# Patient Record
Sex: MALE | Race: WHITE | NOT HISPANIC OR LATINO | Employment: OTHER | URBAN - NONMETROPOLITAN AREA
[De-identification: names, ages, dates, MRNs, and addresses within clinical notes are randomized per-mention and may not be internally consistent; named-entity substitution may affect disease eponyms.]

---

## 2017-05-31 ENCOUNTER — COMMUNICATION - GICH (OUTPATIENT)
Dept: FAMILY MEDICINE | Facility: OTHER | Age: 77
End: 2017-05-31

## 2017-05-31 DIAGNOSIS — I10 ESSENTIAL (PRIMARY) HYPERTENSION: ICD-10-CM

## 2017-06-26 ENCOUNTER — COMMUNICATION - GICH (OUTPATIENT)
Dept: FAMILY MEDICINE | Facility: OTHER | Age: 77
End: 2017-06-26

## 2017-06-26 DIAGNOSIS — I10 ESSENTIAL (PRIMARY) HYPERTENSION: ICD-10-CM

## 2017-12-28 NOTE — TELEPHONE ENCOUNTER
Patient Information     Patient Name MRN Sex Jasson Rae [id number] Male 1940      Telephone Encounter by Marly Novoa RN at 2017 10:22 AM     Author:  Marly Novoa RN Service:  (none) Author Type:  NURS- Registered Nurse     Filed:  2017 10:31 AM Encounter Date:  2017 Status:  Signed     :  Marly Novoa RN (NURS- Registered Nurse)            [employer]    Office visit in the past 12 months or per provider note.    Last visit with SUZI WAKEFIELD was on: 2016 in [facility]  Next visit with SUZI WAKEFIELD is on: No future appointment listed with this provider  Next visit with Family Practice is on: No future appointment listed in this department    Lab test requirements:  Creatinine and Potassium annually, if ordering lab, order BMP.  CREATININE (mg/dL)    Date Value   2015 1.29     POTASSIUM (mmol/L)    Date Value   2015 4.6       Max refill for 12 months from last office visit or per provider note    REFUSED - patient was [facility]. See refill request denial 17    Unable to complete prescription refill per RN Medication Refill Policy.................... Marly Novoa RN ....................  2017   10:30 AM

## 2018-01-05 NOTE — TELEPHONE ENCOUNTER
Patient Information     Patient Name MRN Sex Jasson Rae [id number] Male 1940      Telephone Encounter by Hilaria Benitez RN at 2017  1:35 PM     Author:  Hilaria Benitez RN Service:  (none) Author Type:  NURS- Registered Nurse     Filed:  2017  1:36 PM Encounter Date:  2017 Status:  Signed     :  Hilaria Benitez RN (NURS- Registered Nurse)            Left message to call back  ....................Hilaria Benitez RN  2017   1:35 PM

## 2018-01-05 NOTE — TELEPHONE ENCOUNTER
Patient Information     Patient Name MRN Sex Jasson Rae [id number] Male 1940      Telephone Encounter by Hilaria Benitez RN at 2017  1:40 PM     Author:  Hilaria Benitez RN Service:  (none) Author Type:  NURS- Registered Nurse     Filed:  2017  1:41 PM Encounter Date:  2017 Status:  Signed     :  Hilaria Benitez RN (NURS- Registered Nurse)            Patient's [facility] will [facility] from his doctor there.    Hilaria Benitez RN........2017 1:41 PM

## 2018-01-05 NOTE — TELEPHONE ENCOUNTER
"Patient Information     Patient Name MRN Sex Jasson Rae [id number] Male 1940      Telephone Encounter by Hilaria Benitez RN at 2017  1:02 PM     Author:  Hilaria Benitez RN Service:  (none) Author Type:  NURS- Registered Nurse     Filed:  2017  1:07 PM Encounter Date:  2017 Status:  Signed     :  Hilaria Benitez RN (NURS- Registered Nurse)            Patient has not seen PCP for over 1 year. Patient was sent a letter in December, but patient's wife called stating patient had \"[facility]\" [facility]. Unable to fill per RN refill policy as patient is overdue for labs and exam. To PCP for consideration.    This is a Refill request from: [employer]   Name of Medication: Lisinopril  Quantity requested: 180  Last fill date: 2016  Due for refill: yes  Last visit with GALINDO BROTHERS was on: 2016 in [facility]  PCP:  Galindo Brothers MD  Controlled Substance Agreement:  n/a   Diagnosis r/t this medication request: HTN    Lab test requirements:  Creatinine and Potassium annually, if ordering lab, order BMP.  CREATININE (mg/dL)    Date Value   2015 1.29     POTASSIUM (mmol/L)    Date Value   2015 4.6     Unable to complete prescription refill per RN Medication Refill Policy.................... Hilaria Benitez RN ....................  2017   1:04 PM          "

## 2018-01-30 ENCOUNTER — DOCUMENTATION ONLY (OUTPATIENT)
Dept: FAMILY MEDICINE | Facility: OTHER | Age: 78
End: 2018-01-30

## 2018-01-30 RX ORDER — AMLODIPINE BESYLATE 10 MG/1
10 TABLET ORAL DAILY
COMMUNITY
Start: 2015-12-23 | End: 2022-11-30

## 2018-01-30 RX ORDER — INSULIN GLARGINE 100 [IU]/ML
INJECTION, SOLUTION SUBCUTANEOUS
COMMUNITY
Start: 2015-12-23 | End: 2022-11-30

## 2018-01-30 RX ORDER — CALCIUM CARBONATE 500(1250)
500 TABLET,CHEWABLE ORAL
COMMUNITY
Start: 2016-02-12 | End: 2022-11-30

## 2018-01-30 RX ORDER — MELOXICAM 7.5 MG/1
7.5 TABLET ORAL DAILY
COMMUNITY
Start: 2016-04-12 | End: 2022-11-30

## 2018-01-30 RX ORDER — PREDNISONE 20 MG/1
20 TABLET ORAL
COMMUNITY
Start: 2016-03-31 | End: 2022-11-30

## 2018-01-30 RX ORDER — LANCETS 23 GAUGE
EACH MISCELLANEOUS
COMMUNITY
Start: 2015-12-23

## 2018-01-30 RX ORDER — ASPIRIN 81 MG/1
81 TABLET ORAL
COMMUNITY
Start: 2015-12-23

## 2018-01-30 RX ORDER — HYDROCHLOROTHIAZIDE 25 MG/1
25 TABLET ORAL DAILY
Status: ON HOLD | COMMUNITY
Start: 2016-03-03 | End: 2024-09-14

## 2018-01-30 RX ORDER — LISINOPRIL 40 MG/1
40 TABLET ORAL DAILY
Status: ON HOLD | COMMUNITY
Start: 2016-12-19 | End: 2024-09-14

## 2018-01-30 RX ORDER — INSULIN PUMP SYRINGE, 3 ML
EACH MISCELLANEOUS
COMMUNITY
Start: 2015-12-23

## 2018-01-30 RX ORDER — METOPROLOL TARTRATE 50 MG
50 TABLET ORAL 2 TIMES DAILY
COMMUNITY
Start: 2015-12-23 | End: 2022-11-30

## 2018-01-30 RX ORDER — NICOTINE POLACRILEX 4 MG/1
20 GUM, CHEWING ORAL DAILY
COMMUNITY
Start: 2015-12-23

## 2018-01-30 RX ORDER — GABAPENTIN 300 MG/1
900 CAPSULE ORAL 2 TIMES DAILY
COMMUNITY
Start: 2016-03-31

## 2018-05-01 ENCOUNTER — OFFICE VISIT (OUTPATIENT)
Dept: OTOLARYNGOLOGY | Facility: OTHER | Age: 78
End: 2018-05-01
Attending: OTOLARYNGOLOGY
Payer: COMMERCIAL

## 2018-05-01 DIAGNOSIS — H90.3 SENSORINEURAL HEARING LOSS (SNHL), BILATERAL: Primary | ICD-10-CM

## 2018-05-01 PROCEDURE — G0463 HOSPITAL OUTPT CLINIC VISIT: HCPCS

## 2018-05-01 NOTE — MR AVS SNAPSHOT
"              After Visit Summary   2018    Jasson Dougherty    MRN: [id number]           Patient Information     Date Of Birth          1940        Visit Information        Provider Department      2018 1:10 PM Koby Hernandez MD [facility]        Today's Diagnoses     Sensorineural hearing loss (SNHL), bilateral    -  1       Follow-ups after your visit        Who to contact     If you have questions or need follow up information about today's clinic visit or your schedule please contact [facility] directly at [phone].  Normal or non-critical lab and imaging results will be communicated to you by [email or web address], letter or phone within 4 business days after the clinic has received the results. If you do not hear from us within 7 days, please contact the clinic through [email or web address] or phone. If you have a critical or abnormal lab result, we will notify you by phone as soon as possible.  Submit refill requests through Smartsy or call your pharmacy and they will forward the refill request to us. Please allow 3 business days for your refill to be completed.          Additional Information About Your Visit        MyChart Information     Smartsy lets you send messages to your doctor, view your test results, renew your prescriptions, schedule appointments and more. To sign up, go to [email or web address] . Click on \"Log in\" on the left side of the screen, which will take you to the Welcome page. Then click on \"Sign up Now\" on the right side of the page.     You will be asked to enter the access code listed below, as well as some personal information. Please follow the directions to create your username and password.     Your access code is: [email or web address]  Expires: 2018  7:51 AM     Your access code will  in 90 days. If you need help or a new code, please call your [facility] clinic or [phone].        Care EveryWhere ID     This is your Care EveryWhere ID. " This could be used by other organizations to access your [facility] medical records  [id number]         Blood Pressure from Last 3 Encounters:   04/12/16 170/80   03/31/16 128/56   02/26/16 140/82    Weight from Last 3 Encounters:   04/12/16 113.4 kg (250 lb)   03/31/16 111 kg (244 lb 9.6 oz)   02/26/16 107.5 kg (237 lb)              Today, you had the following     No orders found for display       Primary Care Provider Office Phone # Fax #    Marcin Fiore [phone]       [facility] [address] [facility] [address]        Equal Access to Services     JONATHON HERNANDEZ : Lyn Ramirez, sarmad bobo, renetta doradomacharles salazar, krysta sheikh. So [facility] [phone].    ATENCIÓN: Si habla español, tiene a marie disposición servicios gratuitos de asistencia lingüística. Llame al [phone].    We comply with applicable federal civil rights laws and [place] laws. We do not discriminate on the basis of race, color, national origin, age, disability, sex, sexual orientation, or gender identity.            Thank you!     Thank you for choosing [facility] AND [facility]  for your care. Our goal is always to provide you with excellent care. Hearing back from our patients is one way we can continue to improve our services. Please take a few minutes to complete the written survey that you may receive in the mail after your visit with us. Thank you!             Your Updated Medication List - Protect others around you: Learn how to safely use, store and throw away your medicines at www.disposemymeds.org.          This list is accurate as of 5/1/18 11:59 PM.  Always use your most recent med list.                   Brand Name Dispense Instructions for use Diagnosis    acetaminophen-codeine 300-30 MG per tablet    TYLENOL #3     Take 1-2 tablets by mouth every 4 hours as needed for moderate pain        * aspirin 81 MG EC tablet            * aspirin EC 81 MG EC tablet      Take 81 mg by mouth daily with food        calcium carbonate 1250 (500 Ca) MG Chew      500 mg        capecitabine 500 MG tablet CHEMO    XELODA     Take 1,500 mg by mouth        ferrous sulfate 325 (65 Fe) MG tablet    IRON     Take 325 mg by mouth        fish oil-omega-3 fatty acids 1000 MG capsule           FLOMAX 0.4 MG capsule   Generic drug:  tamsulosin      Take 0.4 mg by mouth daily        gabapentin 300 MG capsule    NEURONTIN     Take 300 mg by mouth 3 times daily        GLUCAGON EMERGENCY 1 MG kit   Generic drug:  glucagon      Inject 1 mg into the muscle        GLUCOPHAGE 1000 MG tablet   Generic drug:  metFORMIN      Take 1,000 mg by mouth        HumaLOG KWIKpen 100 UNIT/ML injection   Generic drug:  insulin lispro      16 units prior to breakfast 26 units b/4 lunch and  supper  DX code:  250.00        * hydrochlorothiazide 25 MG tablet    HYDRODIURIL     Take 25 mg by mouth        * hydrochlorothiazide 25 MG tablet    HYDRODIURIL     Take 25 mg by mouth daily        IBUPROFEN PO      Take 400 mg by mouth every 4 hours as needed for moderate pain        insulin aspart 100 UNIT/ML injection    NovoLOG PEN     1 unit per 8 grams of carbs.  Give within 15 minutes before or after meals/snacks.        insulin pen needle 32G X 8 MM      120 each        * LANTUS SOLOSTAR 100 UNIT/ML injection   Generic drug:  insulin glargine      36 units of lantus twice daily or as adjusted by RN.  DX Code:  250..00        * insulin glargine 100 UNIT/ML injection    LANTUS     Inject  subcutaneous before bedtime. Inject 36 units of lantus twice daily or as adjusted by RN        * lisinopril 20 MG tablet    PRINIVIL/ZESTRIL     Take 40 mg by mouth        * lisinopril 20 MG tablet    PRINIVIL/ZESTRIL     Take 40 mg by mouth 2 times daily        loperamide 2 MG capsule    IMODIUM     Take 2 mg by mouth as needed for diarrhea        meloxicam 7.5 MG tablet    MOBIC     Take 7.5 mg by mouth daily         * metoprolol tartrate 50 MG tablet    LOPRESSOR     TAKE ONE TABLET BY MOUTH TWICE DAILY        * metoprolol tartrate 50 MG tablet    LOPRESSOR     Take 50 mg by mouth 2 times daily        * NORVASC 10 MG tablet   Generic drug:  amLODIPine      Take 10 mg by mouth        * amLODIPine 10 MG tablet    NORVASC     Take 10 mg by mouth daily        * ONETOUCH BASIC SYSTEM w/Device Kit           * FIFTY50 GLUCOSE METER 2.0 w/Device Kit           * ONETOUCH LANCETS Misc           * lancets 28G Misc           ONETOUCH TEST STRIPS test strip   Generic drug:  blood glucose monitoring      check sugars once daily before meals        PRAVACHOL 20 MG tablet   Generic drug:  pravastatin      Take 20 mg by mouth        predniSONE 20 MG tablet    DELTASONE     Take 20 mg by mouth daily with food        * priLOSEC 20 MG CR capsule   Generic drug:  omeprazole           * RA OMEPRAZOLE 20 MG tablet   Generic drug:  omeprazole      Take 20 mg by mouth daily        prochlorperazine 10 MG tablet    COMPAZINE     Take 10 mg by mouth        * Notice:  This list has 18 medication(s) that are the same as other medications prescribed for you. Read the directions carefully, and ask your doctor or other care provider to review them with you.

## 2018-05-01 NOTE — PROGRESS NOTES
MARILY PENNY    78 Y old Male, : 1940    Account Number: [id number]     [address] [id number]    Home: [phone]     Guarantor: MARILY PENNY Insurance: [employer] [facility] Payer ID: [id number]   PCP: Huong Fiore MD    Appointment Facility: [facility]      2018 Koby Hernandez MD        Reason for Appointment     1. HEARING EVALUATION     2. Hearing loss     History of Present Illness     HPI:   The patient is an 78-year-old  who is sent to us by the [employer] for assistance with hearing and hearing aids. He has already service connected for his hearing and gets his hearing aids through the 's administration. He is presently wearing a hearing aid on the right. It is in regularly for repairs. He has tolerated hearing aid use well.     Examination     General Examination:  External auditory canals and TMs are clear bilaterally   The remainder of the head neck exam is unremarkable   Audiogram-he has a relatively flat sensorineural hearing loss with 60 decibel speech reception threshold on the right and 55 decibel speech reception threshold on the left. His discrimination scores are intact.       Assessments     1. Sensorineural hearing loss (SNHL) of both ears - H90.3 (Primary)     Treatment     1. Others   Notes: Patient was counseled that he has moderate to severe sensorineural hearing loss. There is no contraindication to continue hearing aid use. He was referred back to audiology for hearing aid mold production.  Procedures  [ ].                Follow Up     prn                         Electronically signed by KOBY HERNANDEZ MD on 2018 at 01:16 PM CDT     Sign off status: Completed         Review of Systems     [facility]  [address] [place] [address]  Tel: [phone]  Fax:       [ ].           Patient: MARILY PENNY : 1940 Progress Note: Koby Hernandez MD 2018        Note  generated by [employer] EMR/PM Software ([email or web address])

## 2022-02-14 ENCOUNTER — HOSPITAL ENCOUNTER (OUTPATIENT)
Dept: MRI IMAGING | Facility: OTHER | Age: 82
Discharge: HOME | End: 2022-02-14
Attending: FAMILY MEDICINE | Admitting: FAMILY MEDICINE
Payer: COMMERCIAL

## 2022-02-14 DIAGNOSIS — M75.101 TEAR OF RIGHT ROTATOR CUFF, UNSPECIFIED TEAR EXTENT, UNSPECIFIED WHETHER TRAUMATIC: ICD-10-CM

## 2022-02-14 PROCEDURE — 73221 MRI JOINT UPR EXTREM W/O DYE: CPT | Mod: RT

## 2022-05-16 ENCOUNTER — APPOINTMENT (OUTPATIENT)
Dept: CT IMAGING | Facility: OTHER | Age: 82
End: 2022-05-16
Attending: PHYSICIAN ASSISTANT
Payer: COMMERCIAL

## 2022-05-16 ENCOUNTER — APPOINTMENT (OUTPATIENT)
Dept: GENERAL RADIOLOGY | Facility: OTHER | Age: 82
End: 2022-05-16
Attending: PHYSICIAN ASSISTANT
Payer: COMMERCIAL

## 2022-05-16 ENCOUNTER — HOSPITAL ENCOUNTER (EMERGENCY)
Facility: OTHER | Age: 82
Discharge: ANOTHER HOSPITAL | End: 2022-05-16
Attending: PHYSICIAN ASSISTANT | Admitting: PHYSICIAN ASSISTANT
Payer: COMMERCIAL

## 2022-05-16 VITALS
SYSTOLIC BLOOD PRESSURE: 170 MMHG | WEIGHT: 240 LBS | BODY MASS INDEX: 36.37 KG/M2 | DIASTOLIC BLOOD PRESSURE: 78 MMHG | HEART RATE: 81 BPM | TEMPERATURE: 99.5 F | HEIGHT: 68 IN | RESPIRATION RATE: 19 BRPM | OXYGEN SATURATION: 90 %

## 2022-05-16 DIAGNOSIS — J18.9 PNEUMONIA: ICD-10-CM

## 2022-05-16 DIAGNOSIS — R09.02 HYPOXIA: ICD-10-CM

## 2022-05-16 DIAGNOSIS — R53.1 WEAKNESS: ICD-10-CM

## 2022-05-16 LAB
ALBUMIN SERPL-MCNC: 3.8 G/DL (ref 3.5–5.7)
ALBUMIN UR-MCNC: 600 MG/DL
ALP SERPL-CCNC: 52 U/L (ref 34–104)
ALT SERPL W P-5'-P-CCNC: 13 U/L (ref 7–52)
ANION GAP SERPL CALCULATED.3IONS-SCNC: 11 MMOL/L (ref 3–14)
APPEARANCE UR: CLEAR
AST SERPL W P-5'-P-CCNC: 19 U/L (ref 13–39)
BASOPHILS # BLD AUTO: 0 10E3/UL (ref 0–0.2)
BASOPHILS NFR BLD AUTO: 1 %
BILIRUB DIRECT SERPL-MCNC: 0.1 MG/DL (ref 0–0.2)
BILIRUB SERPL-MCNC: 0.4 MG/DL (ref 0.3–1)
BILIRUB UR QL STRIP: NEGATIVE
BUN SERPL-MCNC: 24 MG/DL (ref 7–25)
CALCIUM SERPL-MCNC: 9 MG/DL (ref 8.6–10.3)
CHLORIDE BLD-SCNC: 97 MMOL/L (ref 98–107)
CO2 SERPL-SCNC: 29 MMOL/L (ref 21–31)
COLOR UR AUTO: YELLOW
CREAT SERPL-MCNC: 1.45 MG/DL (ref 0.7–1.3)
EOSINOPHIL # BLD AUTO: 0 10E3/UL (ref 0–0.7)
EOSINOPHIL NFR BLD AUTO: 1 %
ERYTHROCYTE [DISTWIDTH] IN BLOOD BY AUTOMATED COUNT: 14.6 % (ref 10–15)
FLUAV RNA SPEC QL NAA+PROBE: NEGATIVE
FLUBV RNA RESP QL NAA+PROBE: NEGATIVE
GFR SERPL CREATININE-BSD FRML MDRD: 48 ML/MIN/1.73M2
GLUCOSE BLD-MCNC: 184 MG/DL (ref 70–105)
GLUCOSE UR STRIP-MCNC: 30 MG/DL
HCT VFR BLD AUTO: 49.4 % (ref 40–53)
HGB BLD-MCNC: 15.4 G/DL (ref 13.3–17.7)
HGB UR QL STRIP: ABNORMAL
HYALINE CASTS: 1 /LPF
IMM GRANULOCYTES # BLD: 0 10E3/UL
IMM GRANULOCYTES NFR BLD: 1 %
KETONES UR STRIP-MCNC: NEGATIVE MG/DL
LACTATE SERPL-SCNC: 2 MMOL/L (ref 0.7–2)
LEUKOCYTE ESTERASE UR QL STRIP: NEGATIVE
LYMPHOCYTES # BLD AUTO: 0.5 10E3/UL (ref 0.8–5.3)
LYMPHOCYTES NFR BLD AUTO: 8 %
MCH RBC QN AUTO: 29 PG (ref 26.5–33)
MCHC RBC AUTO-ENTMCNC: 31.2 G/DL (ref 31.5–36.5)
MCV RBC AUTO: 93 FL (ref 78–100)
MONOCYTES # BLD AUTO: 0.7 10E3/UL (ref 0–1.3)
MONOCYTES NFR BLD AUTO: 11 %
MUCOUS THREADS #/AREA URNS LPF: PRESENT /LPF
NEUTROPHILS # BLD AUTO: 4.8 10E3/UL (ref 1.6–8.3)
NEUTROPHILS NFR BLD AUTO: 78 %
NITRATE UR QL: NEGATIVE
NRBC # BLD AUTO: 0 10E3/UL
NRBC BLD AUTO-RTO: 0 /100
NT-PROBNP SERPL-MCNC: 219 PG/ML (ref 0–100)
PH UR STRIP: 6 [PH] (ref 5–9)
PLATELET # BLD AUTO: 223 10E3/UL (ref 150–450)
POTASSIUM BLD-SCNC: 4.8 MMOL/L (ref 3.5–5.1)
PROCALCITONIN SERPL-MCNC: 0.87 NG/ML
PROT SERPL-MCNC: 7.4 G/DL (ref 6.4–8.9)
RBC # BLD AUTO: 5.31 10E6/UL (ref 4.4–5.9)
RBC URINE: 1 /HPF
RSV RNA SPEC NAA+PROBE: NEGATIVE
SARS-COV-2 RNA RESP QL NAA+PROBE: NEGATIVE
SODIUM SERPL-SCNC: 137 MMOL/L (ref 134–144)
SP GR UR STRIP: 1.02 (ref 1–1.03)
UROBILINOGEN UR STRIP-MCNC: NORMAL MG/DL
WBC # BLD AUTO: 6 10E3/UL (ref 4–11)
WBC URINE: 2 /HPF

## 2022-05-16 PROCEDURE — 71045 X-RAY EXAM CHEST 1 VIEW: CPT

## 2022-05-16 PROCEDURE — 85025 COMPLETE CBC W/AUTO DIFF WBC: CPT | Performed by: PHYSICIAN ASSISTANT

## 2022-05-16 PROCEDURE — C9803 HOPD COVID-19 SPEC COLLECT: HCPCS | Performed by: PHYSICIAN ASSISTANT

## 2022-05-16 PROCEDURE — 99285 EMERGENCY DEPT VISIT HI MDM: CPT | Mod: 25 | Performed by: PHYSICIAN ASSISTANT

## 2022-05-16 PROCEDURE — 70450 CT HEAD/BRAIN W/O DYE: CPT

## 2022-05-16 PROCEDURE — 83605 ASSAY OF LACTIC ACID: CPT | Performed by: PHYSICIAN ASSISTANT

## 2022-05-16 PROCEDURE — 250N000011 HC RX IP 250 OP 636: Performed by: PHYSICIAN ASSISTANT

## 2022-05-16 PROCEDURE — 96365 THER/PROPH/DIAG IV INF INIT: CPT | Performed by: PHYSICIAN ASSISTANT

## 2022-05-16 PROCEDURE — 80053 COMPREHEN METABOLIC PANEL: CPT | Performed by: PHYSICIAN ASSISTANT

## 2022-05-16 PROCEDURE — 250N000013 HC RX MED GY IP 250 OP 250 PS 637: Performed by: PHYSICIAN ASSISTANT

## 2022-05-16 PROCEDURE — 82248 BILIRUBIN DIRECT: CPT | Performed by: PHYSICIAN ASSISTANT

## 2022-05-16 PROCEDURE — 99285 EMERGENCY DEPT VISIT HI MDM: CPT | Mod: CS | Performed by: PHYSICIAN ASSISTANT

## 2022-05-16 PROCEDURE — 36415 COLL VENOUS BLD VENIPUNCTURE: CPT | Performed by: PHYSICIAN ASSISTANT

## 2022-05-16 PROCEDURE — 87637 SARSCOV2&INF A&B&RSV AMP PRB: CPT | Performed by: PHYSICIAN ASSISTANT

## 2022-05-16 PROCEDURE — 96368 THER/DIAG CONCURRENT INF: CPT | Performed by: PHYSICIAN ASSISTANT

## 2022-05-16 PROCEDURE — 258N000003 HC RX IP 258 OP 636: Performed by: PHYSICIAN ASSISTANT

## 2022-05-16 PROCEDURE — 72125 CT NECK SPINE W/O DYE: CPT

## 2022-05-16 PROCEDURE — 84145 PROCALCITONIN (PCT): CPT | Performed by: PHYSICIAN ASSISTANT

## 2022-05-16 PROCEDURE — 87040 BLOOD CULTURE FOR BACTERIA: CPT | Performed by: PHYSICIAN ASSISTANT

## 2022-05-16 PROCEDURE — 83880 ASSAY OF NATRIURETIC PEPTIDE: CPT | Performed by: PHYSICIAN ASSISTANT

## 2022-05-16 PROCEDURE — 81001 URINALYSIS AUTO W/SCOPE: CPT | Performed by: PHYSICIAN ASSISTANT

## 2022-05-16 RX ORDER — ACETAMINOPHEN 500 MG
500 TABLET ORAL ONCE
Status: COMPLETED | OUTPATIENT
Start: 2022-05-16 | End: 2022-05-16

## 2022-05-16 RX ORDER — CEFTRIAXONE SODIUM 1 G/50ML
1 INJECTION, SOLUTION INTRAVENOUS ONCE
Status: COMPLETED | OUTPATIENT
Start: 2022-05-16 | End: 2022-05-17

## 2022-05-16 RX ORDER — AZITHROMYCIN 500 MG/5ML
500 INJECTION, POWDER, LYOPHILIZED, FOR SOLUTION INTRAVENOUS EVERY 24 HOURS
Status: DISCONTINUED | OUTPATIENT
Start: 2022-05-16 | End: 2022-05-17 | Stop reason: HOSPADM

## 2022-05-16 RX ADMIN — SODIUM CHLORIDE 1000 ML: 9 INJECTION, SOLUTION INTRAVENOUS at 18:18

## 2022-05-16 RX ADMIN — CEFTRIAXONE SODIUM 1 G: 1 INJECTION, SOLUTION INTRAVENOUS at 23:31

## 2022-05-16 RX ADMIN — ACETAMINOPHEN 500 MG: 500 TABLET ORAL at 18:16

## 2022-05-16 RX ADMIN — AZITHROMYCIN MONOHYDRATE 500 MG: 500 INJECTION, POWDER, LYOPHILIZED, FOR SOLUTION INTRAVENOUS at 22:40

## 2022-05-16 ASSESSMENT — ENCOUNTER SYMPTOMS
VOMITING: 0
SHORTNESS OF BREATH: 1
DYSURIA: 0
FATIGUE: 1
CONFUSION: 0
ABDOMINAL PAIN: 0
FEVER: 1
COUGH: 1
NAUSEA: 0

## 2022-05-16 NOTE — ED TRIAGE NOTES
"Arrives per Meds 1 from home. EMS states they were called for breathing problems but they have not assessed any SOB. Pt states he called 911 because \"he could not get up\" had 2 falls today C/O weakness. States \"he hit his chest when he fell\" but \"did not hit his head\". Denies LOC. A&O X4. C/O chronic pain to shoulders. O2 sat low 90's RA. Will keep O2 off for now.   Triage Assessment     Row Name 05/16/22 [id number]       Triage Assessment (Adult)    Airway WDL X    Additional Documentation Breath Sounds (Group)       Respiratory WDL    Respiratory WDL X;rhythm/pattern;cough    Rhythm/Pattern, Respiratory tachypneic    Cough Frequency frequent       Breath Sounds    Breath Sounds All Fields;DOLLY;LLL;RUL;RML;RLL    All Lung Fields Breath Sounds Anterior:    DOLLY Breath Sounds clear    LLL Breath Sounds diminished    RUL Breath Sounds clear    RML Breath Sounds clear    RLL Breath Sounds clear       Skin Circulation/Temperature WDL    Skin Circulation/Temperature WDL WDL       Cardiac WDL    Cardiac WDL WDL       Peripheral/Neurovascular WDL    Peripheral Neurovascular WDL WDL       Cognitive/Neuro/Behavioral WDL    Cognitive/Neuro/Behavioral WDL WDL              "

## 2022-05-17 NOTE — ED PROVIDER NOTES
"  History     Chief Complaint   Patient presents with     Generalized Weakness     Fall     HPI  Jasson Dougherty is a 82 year old male who presents to the ED for evaluation of generalized weakness and a fall. Arrives per Meds 1 from home. EMS states they were called for breathing problems but they have not assessed any SOB. Pt states he called 911 because \"he could not get up\" had 2 falls today C/O weakness. States \"he hit his chest when he fell\" but \"did not hit his head\". Denies LOC. A&O X4. C/O chronic pain to shoulders. He does report a cough.     Allergies:  Allergies   Allergen Reactions     Penicillins Rash and Unknown       Problem List:    Patient Active Problem List    Diagnosis Date Noted     Right shoulder pain 06/01/2016     Priority: Medium     Primary osteoarthritis of left wrist 04/12/2016     Priority: Medium     Carotid stenosis, right 01/11/2016     Priority: Medium     Cerebrovascular accident (CVA) due to embolism of right middle cerebral artery (H) 01/11/2016     Priority: Medium     CVA, old, monoplegia upper limb 12/23/2015     Priority: Medium     Malignant neoplasm of descending colon (H) 12/23/2015     Priority: Medium     MI, old 12/23/2015     Priority: Medium     Stenosis of right carotid artery 12/23/2015     Priority: Medium     Sleep apnea 08/05/2015     Priority: Medium     Rectal cancer (H) 06/29/2015     Priority: Medium     Arthrodesis status 06/26/2015     Priority: Medium     Overview:   [facility]       Morbid obesity (H) 06/26/2015     Priority: Medium     Overview:   Complicated by severe DJD in back and LEs, Diabetes, [place], GERD, Hypertension.       Malignant neoplasm of rectum (H) 06/26/2015     Priority: Medium     Routine general medical examination at a health care facility 06/07/2013     Priority: Medium     Type 2 diabetes mellitus without complication (H) 04/30/2012     Priority: Medium     Overview:   [facility]       Backache " 2012     Priority: Medium     Hypercholesteremia 2009     Priority: Medium     Overview:   IMO Update 10/11       Esophageal reflux 2004     Priority: Medium     Overview:   [place]       HTN (hypertension) 2004     Priority: Medium     Overview:   HTN          Past Medical History:    No past medical history on file.    Past Surgical History:    Past Surgical History:   Procedure Laterality Date     APPENDECTOMY OPEN      No Comments Provided     BACK SURGERY      fusion     COLONOSCOPY           OTHER SURGICAL HISTORY      [id number],HEMICOLECTOMY     OTHER SURGICAL HISTORY      SUR85,ANKLE ARTHROSCOPY       Family History:    No family history on file.    Social History:  Marital Status:   [2]  Social History     Tobacco Use     Smoking status: Former Smoker     Quit date: 1995     Years since quittin.4     Smokeless tobacco: Never Used   Substance Use Topics     Alcohol use: No     Alcohol/week: 0.0 standard drinks     Drug use: Unknown     Types: Other     Comment: Drug use: No        Medications:    acetaminophen-codeine (TYLENOL #3) 300-30 MG per tablet  amLODIPine (NORVASC) 10 MG tablet  amLODIPine (NORVASC) 10 MG tablet  aspirin 81 MG EC tablet  aspirin EC 81 MG EC tablet  blood glucose (ONE TOUCH TEST STRIPS) test strip  Blood Glucose Monitoring Suppl (FIFTY50 GLUCOSE METER 2.0) W/DEVICE KIT  Blood Glucose Monitoring Suppl (ONE TOUCH BASIC SYSTEM) W/DEVICE KIT  calcium carbonate 1250 (500 CA) MG CHEW  capecitabine (XELODA) 500 MG tablet  ferrous sulfate (IRON) 325 (65 FE) MG tablet  fish oil-omega-3 fatty acids 1000 MG capsule  gabapentin (NEURONTIN) 300 MG capsule  glucagon (GLUCAGON EMERGENCY) 1 MG injection  hydrochlorothiazide (HYDRODIURIL) 25 MG tablet  hydrochlorothiazide (HYDRODIURIL) 25 MG tablet  IBUPROFEN PO  insulin aspart (NOVOLOG PEN) 100 UNIT/ML injection  insulin glargine (LANTUS SOLOSTAR) 100 UNIT/ML PEN  insulin glargine (LANTUS) 100 UNIT/ML  "injection  insulin lispro (HUMALOG KWIKPEN) 100 UNIT/ML soln  insulin pen needle 32G X 8 MM  lancets 28G MISC  lisinopril (PRINIVIL,ZESTRIL) 20 MG tablet  lisinopril (PRINIVIL/ZESTRIL) 20 MG tablet  loperamide (IMODIUM) 2 MG capsule  meloxicam (MOBIC) 7.5 MG tablet  metFORMIN (GLUCOPHAGE) 1000 MG tablet  metoprolol (LOPRESSOR) 50 MG tablet  metoprolol tartrate (LOPRESSOR) 50 MG tablet  omeprazole (PRILOSEC) 20 MG capsule  omeprazole (RA OMEPRAZOLE) 20 MG tablet  ONE TOUCH LANCETS MISC  pravastatin (PRAVACHOL) 20 MG tablet  predniSONE (DELTASONE) 20 MG tablet  prochlorperazine (COMPAZINE) 10 MG tablet  tamsulosin (FLOMAX) 0.4 MG 24 hr capsule          Review of Systems   Constitutional: Positive for fatigue and fever.   HENT: Negative for congestion.    Eyes: Negative for visual disturbance.   Respiratory: Positive for cough and shortness of breath.    Cardiovascular: Negative for chest pain.   Gastrointestinal: Negative for abdominal pain, nausea and vomiting.   Genitourinary: Negative for dysuria.   Psychiatric/Behavioral: Negative for confusion.       Physical Exam   BP: (!) 140/54  Pulse: 90  Temp: (!) 101.5  F (38.6  C)  Resp: 20  Height: 172.7 cm (5' 8\")  Weight: 108.9 kg (240 lb)  SpO2: 96 %      Physical Exam  Constitutional:       General: He is not in acute distress.     Appearance: He is well-developed. He is ill-appearing. He is not diaphoretic.   HENT:      Head: Normocephalic and atraumatic.   Eyes:      General: No scleral icterus.     Conjunctiva/sclera: Conjunctivae normal.   Cardiovascular:      Rate and Rhythm: Normal rate and regular rhythm.   Pulmonary:      Effort: Pulmonary effort is normal.      Breath sounds: Normal breath sounds.   Abdominal:      Palpations: Abdomen is soft.      Tenderness: There is no abdominal tenderness.   Musculoskeletal:         General: No deformity.      Cervical back: Neck supple.   Lymphadenopathy:      Cervical: No cervical adenopathy.   Skin:     General: Skin " is warm and dry.      Coloration: Skin is not jaundiced.      Findings: No rash.   Neurological:      Mental Status: He is alert and oriented to person, place, and time. Mental status is at baseline.   Psychiatric:         Mood and Affect: Mood normal.         Behavior: Behavior normal.         Thought Content: Thought content normal.         Judgment: Judgment normal.         ED Course                 Procedures              Critical Care time:  none              Results for orders placed or performed during the hospital encounter of 05/16/22 (from the past 24 hour(s))   Symptomatic; Unknown Influenza A/B & SARS-CoV2 (COVID-19) Virus PCR Multiplex Nose    Specimen: Nose; Swab   Result Value Ref Range    Influenza A PCR Negative Negative    Influenza B PCR Negative Negative    RSV PCR Negative Negative    SARS CoV2 PCR Negative Negative    Narrative    Testing was performed using the Xpert Xpress CoV2/Flu/RSV Assay on the Cepheid GeneXpert Instrument. This test should be ordered for the detection of SARS-CoV-2 and influenza viruses in individuals who meet clinical and/or epidemiological criteria. Test performance is unknown in asymptomatic patients. This test is for in vitro diagnostic use under the FDA EUA for laboratories certified under CLIA to perform high or moderate complexity testing. This test has not been FDA cleared or approved. A negative result does not rule out the presence of PCR inhibitors in the specimen or target RNA in concentration below the limit of detection for the assay. If only one viral target is positive but coinfection with multiple targets is suspected, the sample should be re-tested with another FDA cleared, approved, or authorized test, if coinfection would change clinical management. This test was validated by the [facility] [employer]. These laboratories are certified under the Clinical  Laboratory Improvement Amendments of 1988 (CLIA-88) as qualified to perform high  complexity laboratory testing.   CBC with platelets differential    Narrative    The following orders were created for panel order CBC with platelets differential.  Procedure                               Abnormality         Status                     ---------                               -----------         ------                     CBC with platelets and d...[[id number]]  Abnormal            Final result                 Please view results for these tests on the individual orders.   Basic metabolic panel   Result Value Ref Range    Sodium 137 134 - 144 mmol/L    Potassium 4.8 3.5 - 5.1 mmol/L    Chloride 97 (L) 98 - 107 mmol/L    Carbon Dioxide (CO2) 29 21 - 31 mmol/L    Anion Gap 11 3 - 14 mmol/L    Urea Nitrogen 24 7 - 25 mg/dL    Creatinine 1.45 (H) 0.70 - 1.30 mg/dL    Calcium 9.0 8.6 - 10.3 mg/dL    Glucose 184 (H) 70 - 105 mg/dL    GFR Estimate 48 (L) >60 mL/min/1.73m2   Lactic acid whole blood   Result Value Ref Range    Lactic Acid 2.0 0.7 - 2.0 mmol/L   CBC with platelets and differential   Result Value Ref Range    WBC Count 6.0 4.0 - 11.0 10e3/uL    RBC Count 5.31 4.40 - 5.90 10e6/uL    Hemoglobin 15.4 13.3 - 17.7 g/dL    Hematocrit 49.4 40.0 - 53.0 %    MCV 93 78 - 100 fL    MCH 29.0 26.5 - 33.0 pg    MCHC 31.2 (L) 31.5 - 36.5 g/dL    RDW 14.6 10.0 - 15.0 %    Platelet Count 223 150 - 450 10e3/uL    % Neutrophils 78 %    % Lymphocytes 8 %    % Monocytes 11 %    % Eosinophils 1 %    % Basophils 1 %    % Immature Granulocytes 1 %    NRBCs per 100 WBC 0 <1 /100    Absolute Neutrophils 4.8 1.6 - 8.3 10e3/uL    Absolute Lymphocytes 0.5 (L) 0.8 - 5.3 10e3/uL    Absolute Monocytes 0.7 0.0 - 1.3 10e3/uL    Absolute Eosinophils 0.0 0.0 - 0.7 10e3/uL    Absolute Basophils 0.0 0.0 - 0.2 10e3/uL    Absolute Immature Granulocytes 0.0 <=0.4 10e3/uL    Absolute NRBCs 0.0 10e3/uL   NT pro BNP   Result Value Ref Range    N terminal Pro BNP Inpatient 219 (H) 0 - 100 pg/mL   Procalcitonin   Result Value Ref Range     Procalcitonin 0.87 <0.50 ng/mL ng/mL   Hepatic panel   Result Value Ref Range    Bilirubin Total 0.4 0.3 - 1.0 mg/dL    Bilirubin Direct 0.1 0.0 - 0.2 mg/dL    Protein Total 7.4 6.4 - 8.9 g/dL    Albumin 3.8 3.5 - 5.7 g/dL    Alkaline Phosphatase 52 34 - 104 U/L    AST 19 13 - 39 U/L    ALT 13 7 - 52 U/L   XR Chest Port 1 View    Narrative    Procedure:XR CHEST PORT 1 VIEW    Clinical history:Male, 82 years, cough, fever, weakness. Pneumonia?    Technique: Single view was obtained.    Comparison: None    Findings: The cardiac silhouette is normal. The pulmonary vasculature  is normal.    The lungs are clear. Bony structures demonstrate degenerative changes  at the AC joints.      Impression    Impression:   No acute abnormality. No evidence of acute or active cardiopulmonary  disease.    SUZI PIERRE MD         SYSTEM ID:  [id number]   CT Head w/o Contrast    Narrative    CT HEAD W/O CONTRAST, CT CERVICAL SPINE W/O CONTRAST, 5/16/2022 7:03  PM    History: Male, age 82 years; recent history of multiple falls    Comparison: None.    Technique:  Head CT technique: CT scan was performed of the head/brain without  contrast. Sagittal, coronal and axial reconstructions were reviewed.    Cervical spine CT technique: CT was performed of the cervical spine.  Sagittal, coronal, and axial reconstructions were reviewed.    Findings:  Head CT: The ventricles and sulci are are mildly enlarged with ex  vacuo dilatation of the right lateral ventricle. The gray and white  matter demonstrate a large focus of encephalomalacia in the right MCA  distribution. The bony structures demonstrate no fracture. Paranasal  sinuses are normal.    Cervical spine CT: The  cervical spine demonstrates generalized  straightening of the normal lordotic curvature. There is no evidence  of an acute fracture. Soft tissues of the neck demonstrate no acute  abnormality. Moderate to moderately severe degenerative changes are  seen throughout the cervical  spine.        Impression    IMPRESSION:   Head CT: No evidence of acute intracranial abnormality. Old right MCA  infarct.    Cervical spine CT: No evidence of acute or subacute bony abnormality.    Moderate to moderately severe multilevel degenerative change of the  cervical spine.    SUZI PIERRE MD         SYSTEM ID:  [id number]   CT Cervical Spine w/o Contrast    Narrative    CT HEAD W/O CONTRAST, CT CERVICAL SPINE W/O CONTRAST, 5/16/2022 7:03  PM    History: Male, age 82 years; recent history of multiple falls    Comparison: None.    Technique:  Head CT technique: CT scan was performed of the head/brain without  contrast. Sagittal, coronal and axial reconstructions were reviewed.    Cervical spine CT technique: CT was performed of the cervical spine.  Sagittal, coronal, and axial reconstructions were reviewed.    Findings:  Head CT: The ventricles and sulci are are mildly enlarged with ex  vacuo dilatation of the right lateral ventricle. The gray and white  matter demonstrate a large focus of encephalomalacia in the right MCA  distribution. The bony structures demonstrate no fracture. Paranasal  sinuses are normal.    Cervical spine CT: The  cervical spine demonstrates generalized  straightening of the normal lordotic curvature. There is no evidence  of an acute fracture. Soft tissues of the neck demonstrate no acute  abnormality. Moderate to moderately severe degenerative changes are  seen throughout the cervical spine.        Impression    IMPRESSION:   Head CT: No evidence of acute intracranial abnormality. Old right MCA  infarct.    Cervical spine CT: No evidence of acute or subacute bony abnormality.    Moderate to moderately severe multilevel degenerative change of the  cervical spine.    SUZI PIERRE MD         SYSTEM ID:  [id number]   UA with Microscopic reflex to Culture    Specimen: Urine, Clean Catch   Result Value Ref Range    Color Urine Yellow Colorless, Straw, Light Yellow, Yellow     Appearance Urine Clear Clear    Glucose Urine 30  (A) Negative mg/dL    Bilirubin Urine Negative Negative    Ketones Urine Negative Negative mg/dL    Specific Gravity Urine 1.023 1.000 - 1.030    Blood Urine Small (A) Negative    pH Urine 6.0 5.0 - 9.0    Protein Albumin Urine 600  (A) Negative mg/dL    Urobilinogen Urine Normal Normal, 2.0 mg/dL    Nitrite Urine Negative Negative    Leukocyte Esterase Urine Negative Negative    Mucus Urine Present (A) None Seen /LPF    RBC Urine 1 <=2 /HPF    WBC Urine 2 <=5 /HPF    Hyaline Casts Urine 1 <=2 /LPF    Narrative    Urine Culture not indicated       Medications   azithromycin 500 mg (ZITHROMAX) in 0.9% NaCl 250 mL intermittent infusion 500 mg (has no administration in time range)   cefTRIAXone in d5w (ROCEPHIN) intermittent infusion 1 g (has no administration in time range)   0.9% sodium chloride BOLUS (1,000 mLs Intravenous New Bag 5/16/22 1818)   acetaminophen (TYLENOL) tablet 500 mg (500 mg Oral Given 5/16/22 1816)       Assessments & Plan (with Medical Decision Making)   Nontoxic but ill appearing. Febrile, otherwise stable vital signs.     No leukocytosis, slight renal insufficiency, normal lactate. UA noninfectious, neg covid/flu.     Procalcitonin elevated.     CT head/neck normal. CXR normal.     We attempt to roadtest and he is very sob with movement, and O2 sats drop to low 80's. Requires 1-2L O2.     His sx's clinically seem most consistent with a pneumonia. We did start rocephin/azithromycin.     I think he does need to be hospitalized, unfortunately JUANPABLO is on divert. He is accepted at [facility] by Dr. Zaragoza. He remains stable for ground transport.     Steven Sweet PA-C    I have reviewed the nursing notes.    I have reviewed the findings, diagnosis, plan and need for follow up with the patient.       New Prescriptions    No medications on file       Final diagnoses:   Pneumonia   Hypoxia   Weakness       5/16/2022   [facility]      Steven Sweet PA  05/16/22 [id number]

## 2022-05-21 LAB
BACTERIA BLD CULT: NO GROWTH
BACTERIA BLD CULT: NO GROWTH

## 2022-10-06 ENCOUNTER — HOSPITAL ENCOUNTER (OUTPATIENT)
Dept: GENERAL RADIOLOGY | Facility: OTHER | Age: 82
Discharge: HOME | End: 2022-10-06
Attending: FAMILY MEDICINE
Payer: COMMERCIAL

## 2022-10-06 DIAGNOSIS — Z01.89 ENCOUNTER FOR OTHER SPECIFIED SPECIAL EXAMINATIONS: ICD-10-CM

## 2022-10-06 PROCEDURE — 73030 X-RAY EXAM OF SHOULDER: CPT | Mod: 50

## 2022-11-30 ENCOUNTER — HOSPITAL ENCOUNTER (INPATIENT)
Facility: OTHER | Age: 82
LOS: 3 days | Discharge: HOME | DRG: 193 | End: 2022-12-03
Attending: FAMILY MEDICINE | Admitting: INTERNAL MEDICINE
Payer: COMMERCIAL

## 2022-11-30 ENCOUNTER — APPOINTMENT (OUTPATIENT)
Dept: GENERAL RADIOLOGY | Facility: OTHER | Age: 82
DRG: 193 | End: 2022-11-30
Attending: FAMILY MEDICINE
Payer: COMMERCIAL

## 2022-11-30 DIAGNOSIS — R09.02 HYPOXEMIA: ICD-10-CM

## 2022-11-30 DIAGNOSIS — I69.954: ICD-10-CM

## 2022-11-30 DIAGNOSIS — R50.9 HYPERTHERMIA-INDUCED DEFECT: ICD-10-CM

## 2022-11-30 DIAGNOSIS — J10.1 INFLUENZA A: ICD-10-CM

## 2022-11-30 DIAGNOSIS — E13.9 DIABETES MELLITUS OF OTHER TYPE WITHOUT COMPLICATION, UNSPECIFIED WHETHER LONG TERM INSULIN USE (H): ICD-10-CM

## 2022-11-30 DIAGNOSIS — Z11.52 ENCOUNTER FOR SCREENING LABORATORY TESTING FOR COVID-19 VIRUS: ICD-10-CM

## 2022-11-30 DIAGNOSIS — I25.2 OLD MYOCARDIAL INFARCTION: ICD-10-CM

## 2022-11-30 DIAGNOSIS — R50.81 FEVER IN OTHER DISEASES: ICD-10-CM

## 2022-11-30 DIAGNOSIS — J96.01 ACUTE RESPIRATORY FAILURE WITH HYPOXIA (H): Primary | ICD-10-CM

## 2022-11-30 DIAGNOSIS — R09.02 HYPOXIA: ICD-10-CM

## 2022-11-30 LAB
ALBUMIN SERPL BCG-MCNC: 3.2 G/DL (ref 3.5–5.2)
ALBUMIN UR-MCNC: 100 MG/DL
ALP SERPL-CCNC: 76 U/L (ref 40–129)
ALT SERPL W P-5'-P-CCNC: 20 U/L (ref 10–50)
ANION GAP SERPL CALCULATED.3IONS-SCNC: 14 MMOL/L (ref 7–15)
APPEARANCE UR: CLEAR
APTT PPP: 28 SECONDS (ref 22–38)
AST SERPL W P-5'-P-CCNC: 14 U/L (ref 10–50)
BASE EXCESS BLDV CALC-SCNC: -5.5 MMOL/L (ref -7.7–1.9)
BASOPHILS # BLD AUTO: 0 10E3/UL (ref 0–0.2)
BASOPHILS NFR BLD AUTO: 0 %
BILIRUB SERPL-MCNC: 0.3 MG/DL
BILIRUB UR QL STRIP: NEGATIVE
BUN SERPL-MCNC: 41.9 MG/DL (ref 8–23)
CALCIUM SERPL-MCNC: 8.8 MG/DL (ref 8.8–10.2)
CHLORIDE SERPL-SCNC: 104 MMOL/L (ref 98–107)
COLOR UR AUTO: ABNORMAL
CREAT SERPL-MCNC: 1.65 MG/DL (ref 0.67–1.17)
DEPRECATED HCO3 PLAS-SCNC: 18 MMOL/L (ref 22–29)
EOSINOPHIL # BLD AUTO: 0.1 10E3/UL (ref 0–0.7)
EOSINOPHIL NFR BLD AUTO: 1 %
ERYTHROCYTE [DISTWIDTH] IN BLOOD BY AUTOMATED COUNT: 14.6 % (ref 10–15)
FLUAV RNA SPEC QL NAA+PROBE: POSITIVE
FLUBV RNA RESP QL NAA+PROBE: NEGATIVE
GFR SERPL CREATININE-BSD FRML MDRD: 41 ML/MIN/1.73M2
GLUCOSE BLDC GLUCOMTR-MCNC: 106 MG/DL (ref 70–99)
GLUCOSE BLDC GLUCOMTR-MCNC: 115 MG/DL (ref 70–99)
GLUCOSE BLDC GLUCOMTR-MCNC: 83 MG/DL (ref 70–99)
GLUCOSE SERPL-MCNC: 85 MG/DL (ref 70–99)
GLUCOSE UR STRIP-MCNC: NEGATIVE MG/DL
HCO3 BLDV-SCNC: 20 MMOL/L (ref 21–28)
HCT VFR BLD AUTO: 41.5 % (ref 40–53)
HGB BLD-MCNC: 13.2 G/DL (ref 13.3–17.7)
HGB UR QL STRIP: NEGATIVE
HOLD SPECIMEN: NORMAL
IMM GRANULOCYTES # BLD: 0.1 10E3/UL
IMM GRANULOCYTES NFR BLD: 1 %
INR PPP: 1.14 (ref 0.85–1.15)
KETONES UR STRIP-MCNC: NEGATIVE MG/DL
LACTATE SERPL-SCNC: 1.5 MMOL/L (ref 0.7–2)
LEUKOCYTE ESTERASE UR QL STRIP: NEGATIVE
LYMPHOCYTES # BLD AUTO: 0.4 10E3/UL (ref 0.8–5.3)
LYMPHOCYTES NFR BLD AUTO: 4 %
MAGNESIUM SERPL-MCNC: 1.2 MG/DL (ref 1.7–2.3)
MCH RBC QN AUTO: 28.1 PG (ref 26.5–33)
MCHC RBC AUTO-ENTMCNC: 31.8 G/DL (ref 31.5–36.5)
MCV RBC AUTO: 89 FL (ref 78–100)
MONOCYTES # BLD AUTO: 0.8 10E3/UL (ref 0–1.3)
MONOCYTES NFR BLD AUTO: 8 %
MUCOUS THREADS #/AREA URNS LPF: PRESENT /LPF
NEUTROPHILS # BLD AUTO: 8.8 10E3/UL (ref 1.6–8.3)
NEUTROPHILS NFR BLD AUTO: 86 %
NITRATE UR QL: NEGATIVE
NRBC # BLD AUTO: 0 10E3/UL
NRBC BLD AUTO-RTO: 0 /100
NT-PROBNP SERPL-MCNC: 920 PG/ML (ref 0–1800)
O2/TOTAL GAS SETTING VFR VENT: 24 %
OXYHGB MFR BLDV: 73 % (ref 70–75)
PCO2 BLDV: 38 MM HG (ref 40–50)
PH BLDV: 7.33 [PH] (ref 7.32–7.43)
PH UR STRIP: 5.5 [PH] (ref 5–9)
PLATELET # BLD AUTO: 231 10E3/UL (ref 150–450)
PO2 BLDV: 40 MM HG (ref 25–47)
POTASSIUM SERPL-SCNC: 4.8 MMOL/L (ref 3.4–5.3)
PROT SERPL-MCNC: 6.4 G/DL (ref 6.4–8.3)
RBC # BLD AUTO: 4.69 10E6/UL (ref 4.4–5.9)
RBC URINE: 1 /HPF
RSV RNA SPEC NAA+PROBE: NEGATIVE
SARS-COV-2 RNA RESP QL NAA+PROBE: NEGATIVE
SODIUM SERPL-SCNC: 136 MMOL/L (ref 136–145)
SP GR UR STRIP: 1.02 (ref 1–1.03)
UROBILINOGEN UR STRIP-MCNC: NORMAL MG/DL
WBC # BLD AUTO: 10.2 10E3/UL (ref 4–11)
WBC URINE: 1 /HPF

## 2022-11-30 PROCEDURE — 36415 COLL VENOUS BLD VENIPUNCTURE: CPT | Performed by: FAMILY MEDICINE

## 2022-11-30 PROCEDURE — 82805 BLOOD GASES W/O2 SATURATION: CPT | Performed by: FAMILY MEDICINE

## 2022-11-30 PROCEDURE — 999N000157 HC STATISTIC RCP TIME EA 10 MIN

## 2022-11-30 PROCEDURE — 99223 1ST HOSP IP/OBS HIGH 75: CPT | Performed by: INTERNAL MEDICINE

## 2022-11-30 PROCEDURE — 96365 THER/PROPH/DIAG IV INF INIT: CPT | Performed by: FAMILY MEDICINE

## 2022-11-30 PROCEDURE — 250N000013 HC RX MED GY IP 250 OP 250 PS 637: Performed by: FAMILY MEDICINE

## 2022-11-30 PROCEDURE — 250N000011 HC RX IP 250 OP 636: Performed by: INTERNAL MEDICINE

## 2022-11-30 PROCEDURE — 71045 X-RAY EXAM CHEST 1 VIEW: CPT | Mod: TC

## 2022-11-30 PROCEDURE — 250N000011 HC RX IP 250 OP 636: Performed by: FAMILY MEDICINE

## 2022-11-30 PROCEDURE — 80053 COMPREHEN METABOLIC PANEL: CPT | Performed by: FAMILY MEDICINE

## 2022-11-30 PROCEDURE — 87077 CULTURE AEROBIC IDENTIFY: CPT | Performed by: FAMILY MEDICINE

## 2022-11-30 PROCEDURE — 250N000012 HC RX MED GY IP 250 OP 636 PS 637: Performed by: INTERNAL MEDICINE

## 2022-11-30 PROCEDURE — 83880 ASSAY OF NATRIURETIC PEPTIDE: CPT | Performed by: FAMILY MEDICINE

## 2022-11-30 PROCEDURE — 99285 EMERGENCY DEPT VISIT HI MDM: CPT | Mod: CS | Performed by: FAMILY MEDICINE

## 2022-11-30 PROCEDURE — 85610 PROTHROMBIN TIME: CPT | Performed by: FAMILY MEDICINE

## 2022-11-30 PROCEDURE — 87086 URINE CULTURE/COLONY COUNT: CPT | Performed by: FAMILY MEDICINE

## 2022-11-30 PROCEDURE — 85730 THROMBOPLASTIN TIME PARTIAL: CPT | Performed by: FAMILY MEDICINE

## 2022-11-30 PROCEDURE — 87637 SARSCOV2&INF A&B&RSV AMP PRB: CPT | Performed by: FAMILY MEDICINE

## 2022-11-30 PROCEDURE — 96367 TX/PROPH/DG ADDL SEQ IV INF: CPT | Performed by: FAMILY MEDICINE

## 2022-11-30 PROCEDURE — C9803 HOPD COVID-19 SPEC COLLECT: HCPCS | Performed by: FAMILY MEDICINE

## 2022-11-30 PROCEDURE — 87040 BLOOD CULTURE FOR BACTERIA: CPT | Performed by: FAMILY MEDICINE

## 2022-11-30 PROCEDURE — 258N000003 HC RX IP 258 OP 636: Performed by: FAMILY MEDICINE

## 2022-11-30 PROCEDURE — 250N000013 HC RX MED GY IP 250 OP 250 PS 637: Performed by: INTERNAL MEDICINE

## 2022-11-30 PROCEDURE — 83605 ASSAY OF LACTIC ACID: CPT | Performed by: FAMILY MEDICINE

## 2022-11-30 PROCEDURE — 258N000003 HC RX IP 258 OP 636: Performed by: INTERNAL MEDICINE

## 2022-11-30 PROCEDURE — 85025 COMPLETE CBC W/AUTO DIFF WBC: CPT | Performed by: FAMILY MEDICINE

## 2022-11-30 PROCEDURE — 120N000001 HC R&B MED SURG/OB

## 2022-11-30 PROCEDURE — 82040 ASSAY OF SERUM ALBUMIN: CPT | Performed by: FAMILY MEDICINE

## 2022-11-30 PROCEDURE — 81001 URINALYSIS AUTO W/SCOPE: CPT | Performed by: FAMILY MEDICINE

## 2022-11-30 PROCEDURE — 96375 TX/PRO/DX INJ NEW DRUG ADDON: CPT | Performed by: FAMILY MEDICINE

## 2022-11-30 PROCEDURE — 99285 EMERGENCY DEPT VISIT HI MDM: CPT | Mod: 25,CS | Performed by: FAMILY MEDICINE

## 2022-11-30 PROCEDURE — 83735 ASSAY OF MAGNESIUM: CPT | Performed by: INTERNAL MEDICINE

## 2022-11-30 RX ORDER — BISACODYL 10 MG
10 SUPPOSITORY, RECTAL RECTAL DAILY PRN
Status: DISCONTINUED | OUTPATIENT
Start: 2022-11-30 | End: 2022-12-03 | Stop reason: HOSPADM

## 2022-11-30 RX ORDER — METOPROLOL TARTRATE 25 MG/1
25 TABLET, FILM COATED ORAL 2 TIMES DAILY
Status: DISCONTINUED | OUTPATIENT
Start: 2022-11-30 | End: 2022-11-30

## 2022-11-30 RX ORDER — ACETAMINOPHEN 500 MG
1000 TABLET ORAL ONCE
Status: COMPLETED | OUTPATIENT
Start: 2022-11-30 | End: 2022-11-30

## 2022-11-30 RX ORDER — OSELTAMIVIR PHOSPHATE 75 MG/1
75 CAPSULE ORAL 2 TIMES DAILY
Status: DISCONTINUED | OUTPATIENT
Start: 2022-11-30 | End: 2022-11-30 | Stop reason: DRUGHIGH

## 2022-11-30 RX ORDER — ATORVASTATIN CALCIUM 10 MG/1
10 TABLET, FILM COATED ORAL DAILY
Status: DISCONTINUED | OUTPATIENT
Start: 2022-11-30 | End: 2022-12-03 | Stop reason: HOSPADM

## 2022-11-30 RX ORDER — AMOXICILLIN 250 MG
2 CAPSULE ORAL 2 TIMES DAILY PRN
Status: DISCONTINUED | OUTPATIENT
Start: 2022-11-30 | End: 2022-12-03 | Stop reason: HOSPADM

## 2022-11-30 RX ORDER — MULTIVIT-MIN/IRON/FOLIC ACID/K 18-600-40
1 CAPSULE ORAL DAILY
COMMUNITY

## 2022-11-30 RX ORDER — CYANOCOBALAMIN 1000 UG/ML
1 INJECTION, SOLUTION INTRAMUSCULAR; SUBCUTANEOUS
Status: ON HOLD | COMMUNITY
End: 2024-09-14

## 2022-11-30 RX ORDER — NITROGLYCERIN 0.4 MG/1
0.4 TABLET SUBLINGUAL EVERY 5 MIN PRN
Status: DISCONTINUED | OUTPATIENT
Start: 2022-11-30 | End: 2022-12-03 | Stop reason: HOSPADM

## 2022-11-30 RX ORDER — HYDROCHLOROTHIAZIDE 25 MG/1
25 TABLET ORAL DAILY
Status: DISCONTINUED | OUTPATIENT
Start: 2022-12-01 | End: 2022-12-03 | Stop reason: HOSPADM

## 2022-11-30 RX ORDER — PANTOPRAZOLE SODIUM 40 MG/1
40 TABLET, DELAYED RELEASE ORAL DAILY
Status: DISCONTINUED | OUTPATIENT
Start: 2022-12-01 | End: 2022-12-03 | Stop reason: HOSPADM

## 2022-11-30 RX ORDER — AMLODIPINE BESYLATE 10 MG/1
10 TABLET ORAL DAILY
Status: DISCONTINUED | OUTPATIENT
Start: 2022-11-30 | End: 2022-11-30

## 2022-11-30 RX ORDER — ACETAMINOPHEN 500 MG
1000 TABLET ORAL 2 TIMES DAILY
COMMUNITY

## 2022-11-30 RX ORDER — KETOROLAC TROMETHAMINE 15 MG/ML
15 INJECTION, SOLUTION INTRAMUSCULAR; INTRAVENOUS ONCE
Status: COMPLETED | OUTPATIENT
Start: 2022-11-30 | End: 2022-11-30

## 2022-11-30 RX ORDER — ATORVASTATIN CALCIUM 10 MG/1
10 TABLET, FILM COATED ORAL DAILY
Status: DISCONTINUED | OUTPATIENT
Start: 2022-11-30 | End: 2022-11-30

## 2022-11-30 RX ORDER — GABAPENTIN 300 MG/1
300 CAPSULE ORAL 3 TIMES DAILY
Status: DISCONTINUED | OUTPATIENT
Start: 2022-11-30 | End: 2022-11-30

## 2022-11-30 RX ORDER — ONDANSETRON 2 MG/ML
4 INJECTION INTRAMUSCULAR; INTRAVENOUS EVERY 6 HOURS PRN
Status: DISCONTINUED | OUTPATIENT
Start: 2022-11-30 | End: 2022-12-03 | Stop reason: HOSPADM

## 2022-11-30 RX ORDER — METOPROLOL TARTRATE 25 MG/1
25 TABLET, FILM COATED ORAL 2 TIMES DAILY
Status: DISCONTINUED | OUTPATIENT
Start: 2022-11-30 | End: 2022-12-03 | Stop reason: HOSPADM

## 2022-11-30 RX ORDER — CEFTRIAXONE SODIUM 2 G/50ML
2 INJECTION, SOLUTION INTRAVENOUS ONCE
Status: COMPLETED | OUTPATIENT
Start: 2022-11-30 | End: 2022-11-30

## 2022-11-30 RX ORDER — PANTOPRAZOLE SODIUM 40 MG/1
40 TABLET, DELAYED RELEASE ORAL DAILY
Status: DISCONTINUED | OUTPATIENT
Start: 2022-11-30 | End: 2022-11-30

## 2022-11-30 RX ORDER — DEXTROSE MONOHYDRATE 25 G/50ML
25-50 INJECTION, SOLUTION INTRAVENOUS
Status: DISCONTINUED | OUTPATIENT
Start: 2022-11-30 | End: 2022-12-03 | Stop reason: HOSPADM

## 2022-11-30 RX ORDER — GABAPENTIN 300 MG/1
300 CAPSULE ORAL 3 TIMES DAILY
Status: DISCONTINUED | OUTPATIENT
Start: 2022-11-30 | End: 2022-12-01

## 2022-11-30 RX ORDER — OSELTAMIVIR PHOSPHATE 30 MG/1
30 CAPSULE ORAL 2 TIMES DAILY
Status: DISCONTINUED | OUTPATIENT
Start: 2022-11-30 | End: 2022-11-30

## 2022-11-30 RX ORDER — LISINOPRIL 40 MG/1
40 TABLET ORAL 2 TIMES DAILY
Status: DISCONTINUED | OUTPATIENT
Start: 2022-11-30 | End: 2022-11-30

## 2022-11-30 RX ORDER — AMLODIPINE BESYLATE 10 MG/1
10 TABLET ORAL DAILY
Status: DISCONTINUED | OUTPATIENT
Start: 2022-12-01 | End: 2022-12-03 | Stop reason: HOSPADM

## 2022-11-30 RX ORDER — TAMSULOSIN HYDROCHLORIDE 0.4 MG/1
0.4 CAPSULE ORAL DAILY
Status: DISCONTINUED | OUTPATIENT
Start: 2022-11-30 | End: 2022-12-03 | Stop reason: HOSPADM

## 2022-11-30 RX ORDER — ONDANSETRON 4 MG/1
4 TABLET, ORALLY DISINTEGRATING ORAL EVERY 6 HOURS PRN
Status: DISCONTINUED | OUTPATIENT
Start: 2022-11-30 | End: 2022-12-03 | Stop reason: HOSPADM

## 2022-11-30 RX ORDER — ASPIRIN 81 MG/1
81 TABLET ORAL AT BEDTIME
Status: DISCONTINUED | OUTPATIENT
Start: 2022-12-01 | End: 2022-12-03 | Stop reason: HOSPADM

## 2022-11-30 RX ORDER — METOPROLOL TARTRATE 50 MG
50 TABLET ORAL 2 TIMES DAILY
Status: DISCONTINUED | OUTPATIENT
Start: 2022-11-30 | End: 2022-11-30

## 2022-11-30 RX ORDER — AZITHROMYCIN 500 MG/5ML
500 INJECTION, POWDER, LYOPHILIZED, FOR SOLUTION INTRAVENOUS EVERY 24 HOURS
Status: DISCONTINUED | OUTPATIENT
Start: 2022-11-30 | End: 2022-12-03 | Stop reason: HOSPADM

## 2022-11-30 RX ORDER — LIDOCAINE 40 MG/G
CREAM TOPICAL
Status: DISCONTINUED | OUTPATIENT
Start: 2022-11-30 | End: 2022-12-03 | Stop reason: HOSPADM

## 2022-11-30 RX ORDER — CEFTRIAXONE SODIUM 2 G/50ML
2 INJECTION, SOLUTION INTRAVENOUS EVERY 24 HOURS
Status: DISCONTINUED | OUTPATIENT
Start: 2022-11-30 | End: 2022-12-03 | Stop reason: HOSPADM

## 2022-11-30 RX ORDER — AMOXICILLIN 250 MG
1 CAPSULE ORAL 2 TIMES DAILY PRN
Status: DISCONTINUED | OUTPATIENT
Start: 2022-11-30 | End: 2022-12-03 | Stop reason: HOSPADM

## 2022-11-30 RX ORDER — OSELTAMIVIR PHOSPHATE 30 MG/1
30 CAPSULE ORAL 2 TIMES DAILY
Status: DISCONTINUED | OUTPATIENT
Start: 2022-11-30 | End: 2022-12-03 | Stop reason: HOSPADM

## 2022-11-30 RX ORDER — SODIUM CHLORIDE 9 MG/ML
INJECTION, SOLUTION INTRAVENOUS CONTINUOUS
Status: DISCONTINUED | OUTPATIENT
Start: 2022-11-30 | End: 2022-12-02

## 2022-11-30 RX ORDER — ACETAMINOPHEN 500 MG
1000 TABLET ORAL EVERY 6 HOURS PRN
Status: DISCONTINUED | OUTPATIENT
Start: 2022-11-30 | End: 2022-11-30 | Stop reason: DRUGHIGH

## 2022-11-30 RX ORDER — POLYETHYLENE GLYCOL 3350 17 G/17G
17 POWDER, FOR SOLUTION ORAL DAILY PRN
Status: DISCONTINUED | OUTPATIENT
Start: 2022-11-30 | End: 2022-12-03 | Stop reason: HOSPADM

## 2022-11-30 RX ORDER — AZITHROMYCIN 500 MG/5ML
500 INJECTION, POWDER, LYOPHILIZED, FOR SOLUTION INTRAVENOUS ONCE
Status: DISCONTINUED | OUTPATIENT
Start: 2022-11-30 | End: 2022-11-30

## 2022-11-30 RX ORDER — NICOTINE POLACRILEX 4 MG
15-30 LOZENGE BUCCAL
Status: DISCONTINUED | OUTPATIENT
Start: 2022-11-30 | End: 2022-12-03 | Stop reason: HOSPADM

## 2022-11-30 RX ORDER — HYDROCHLOROTHIAZIDE 25 MG/1
25 TABLET ORAL DAILY
Status: DISCONTINUED | OUTPATIENT
Start: 2022-11-30 | End: 2022-11-30

## 2022-11-30 RX ORDER — LISINOPRIL 40 MG/1
40 TABLET ORAL DAILY
Status: DISCONTINUED | OUTPATIENT
Start: 2022-11-30 | End: 2022-12-03 | Stop reason: HOSPADM

## 2022-11-30 RX ORDER — ASPIRIN 81 MG/1
81 TABLET ORAL DAILY
Status: DISCONTINUED | OUTPATIENT
Start: 2022-11-30 | End: 2022-11-30

## 2022-11-30 RX ORDER — MAGNESIUM SULFATE HEPTAHYDRATE 40 MG/ML
4 INJECTION, SOLUTION INTRAVENOUS ONCE
Status: COMPLETED | OUTPATIENT
Start: 2022-11-30 | End: 2022-11-30

## 2022-11-30 RX ORDER — ACETAMINOPHEN 325 MG/1
975 TABLET ORAL EVERY 6 HOURS PRN
Status: DISCONTINUED | OUTPATIENT
Start: 2022-11-30 | End: 2022-12-03 | Stop reason: HOSPADM

## 2022-11-30 RX ORDER — NITROGLYCERIN 0.4 MG/1
0.4 TABLET SUBLINGUAL EVERY 5 MIN PRN
COMMUNITY

## 2022-11-30 RX ADMIN — OSELTAMIVIR PHOSPHATE 30 MG: 30 CAPSULE ORAL at 10:21

## 2022-11-30 RX ADMIN — CEFTRIAXONE SODIUM 2 G: 2 INJECTION, SOLUTION INTRAVENOUS at 21:58

## 2022-11-30 RX ADMIN — SODIUM CHLORIDE: 9 INJECTION, SOLUTION INTRAVENOUS at 04:03

## 2022-11-30 RX ADMIN — ACETAMINOPHEN 1000 MG: 500 TABLET ORAL at 03:05

## 2022-11-30 RX ADMIN — CEFTRIAXONE SODIUM 2 G: 2 INJECTION, SOLUTION INTRAVENOUS at 03:21

## 2022-11-30 RX ADMIN — ATORVASTATIN CALCIUM 10 MG: 10 TABLET, FILM COATED ORAL at 22:02

## 2022-11-30 RX ADMIN — SODIUM CHLORIDE: 9 INJECTION, SOLUTION INTRAVENOUS at 12:30

## 2022-11-30 RX ADMIN — AZITHROMYCIN 500 MG: 500 INJECTION, POWDER, LYOPHILIZED, FOR SOLUTION INTRAVENOUS at 04:00

## 2022-11-30 RX ADMIN — TAMSULOSIN HYDROCHLORIDE 0.4 MG: 0.4 CAPSULE ORAL at 14:04

## 2022-11-30 RX ADMIN — ACETAMINOPHEN 975 MG: 325 TABLET ORAL at 22:06

## 2022-11-30 RX ADMIN — METOPROLOL TARTRATE 25 MG: 25 TABLET, FILM COATED ORAL at 22:02

## 2022-11-30 RX ADMIN — AZITHROMYCIN 500 MG: 500 INJECTION, POWDER, LYOPHILIZED, FOR SOLUTION INTRAVENOUS at 16:42

## 2022-11-30 RX ADMIN — SODIUM CHLORIDE 1000 ML: 9 INJECTION, SOLUTION INTRAVENOUS at 03:01

## 2022-11-30 RX ADMIN — OSELTAMIVIR PHOSPHATE 30 MG: 30 CAPSULE ORAL at 22:02

## 2022-11-30 RX ADMIN — KETOROLAC TROMETHAMINE 15 MG: 15 INJECTION, SOLUTION INTRAMUSCULAR; INTRAVENOUS at 03:23

## 2022-11-30 RX ADMIN — INSULIN GLARGINE 36 UNITS: 100 INJECTION, SOLUTION SUBCUTANEOUS at 22:02

## 2022-11-30 RX ADMIN — GABAPENTIN 300 MG: 300 CAPSULE ORAL at 16:45

## 2022-11-30 RX ADMIN — MAGNESIUM SULFATE HEPTAHYDRATE 4 G: 40 INJECTION, SOLUTION INTRAVENOUS at 18:30

## 2022-11-30 ASSESSMENT — ACTIVITIES OF DAILY LIVING (ADL)
ADLS_ACUITY_SCORE: 37
ADLS_ACUITY_SCORE: 30
ADLS_ACUITY_SCORE: 35
ADLS_ACUITY_SCORE: 30
ADLS_ACUITY_SCORE: 37
ADLS_ACUITY_SCORE: 29
ADLS_ACUITY_SCORE: 37
ADLS_ACUITY_SCORE: 37
ADLS_ACUITY_SCORE: 29
ADLS_ACUITY_SCORE: 30
ADLS_ACUITY_SCORE: 30

## 2022-11-30 ASSESSMENT — ENCOUNTER SYMPTOMS
WEAKNESS: 1
COUGH: 0
FEVER: 1
SHORTNESS OF BREATH: 0

## 2022-11-30 NOTE — PLAN OF CARE
Goal Outcome Evaluation:      Plan of Care Reviewed With: patient    Overall Patient Progress: improvingOverall Patient Progress: improving       Patient is alert, orientated, and hard of hearing. Patient was weaned from 2 liters to 1 liter nasal canula and tolerating well. Patient has some expiratory wheezes with diminished lung sounds in bases. Patient denies any SOB. VSS and afebrile. Patient has a poor appetite and general weakness. No open skin. Patient is voiding without difficulty. Elzbieta Love RN on 11/30/2022 at 5:23 PM

## 2022-11-30 NOTE — PROGRESS NOTES
11/30/22 1157   Initial Information   Patient Belongings remains with patient   Patient Belongings Remaining with Patient clothing;watch   Did you bring any home meds/supplements to the hospital?  No     [facility] will make every effort per our policy to help keep your items safe while in the hospital.  If you choose to keep any items at the bedside, we cannot be held responsible for any items that are lost or broken.      List items sent to safe: none    I have reviewed my belongings list on admission and verify that it is correct.     Patient signature_______________________________  Date/Time_____________________    2nd Staff person if patient unable to sign __________________________  Date/Time ______________________      I have received all my belongings noted above at discharge.    Patient signature________________________________  Date/Time  __________________________

## 2022-11-30 NOTE — ED NOTES
Pt's family member Shruthi called and stated that she is able to go through medications with pharmacy to do a med rec at [phone].

## 2022-11-30 NOTE — PHARMACY
Pharmacy- Renal Dose Adjustment    Patient Active Problem List   Diagnosis     Rectal cancer (H)     Esophageal reflux     Arthrodesis status     Type 2 diabetes mellitus without complication (H)     Morbid obesity (H)     Hypercholesteremia     Routine general medical examination at a health care facility     Malignant neoplasm of rectum (H)     Sleep apnea     HTN (hypertension)     Backache     Carotid stenosis, right     Cerebrovascular accident (CVA) due to embolism of right middle cerebral artery (H)     CVA, old, monoplegia upper limb     Malignant neoplasm of descending colon (H)     MI, old     Primary osteoarthritis of left wrist     Right shoulder pain     Stenosis of right carotid artery     Influenza A     Hypoxia     Fever in other diseases     Acute respiratory failure with hypoxia (H)        Relevant Labs:  Recent Labs   Lab Test 11/30/22  0315 05/16/22  1838   WBC 10.2 6.0   HGB 13.2* 15.4    223        CrCl: 39.8 mL/min    No intake or output data in the 24 hours ending 11/30/22 1308       Per Renal Dose Adjustment Protocol, will adjust:  -Oseltamivir to 30 mg Q12H (due to CrCl 30-60 mL/min)      Will continue to follow and make adjustments accordingly. Thank You.    Taz Esquivel [facility] ....................  11/30/2022   1:08 PM

## 2022-11-30 NOTE — PROGRESS NOTES
"[facility] ADMISSION NOTE    Patient admitted to room 306 at approximately 1145 via wheel chair from emergency room. Patient was accompanied by transport tech.     Verbal SBAR report received from ROSIE Car prior to patient arrival.     Patient ambulated to bed with stand-by assist. Patient alert and oriented X 3. Pain is controlled without any medications, patient declines any interventions and pain is chronic.  . Admission vital signs: Blood pressure (!) 120/35, pulse 62, temperature 97.1  F (36.2  C), temperature source Tympanic, resp. rate 20, height 1.727 m (5' 8\"), weight 108.5 kg (239 lb 3.2 oz), SpO2 95 %. Patient was oriented to plan of care, call light, bed controls, tv, telephone, bathroom and visiting hours.     Risk Assessment    The following safety risks were identified during admission: fall and isolation. Yellow risk band applied: YES.     Skin Initial Assessment    This writer admitted this patient and completed a full skin assessment and Les score in the Adult PCS flowsheet. Appropriate interventions initiated as needed.     Education    Patient has a [place] to Observation order: No  Observation education completed and documented: No      Amaya Ordonez RN    "

## 2022-11-30 NOTE — PHARMACY-ADMISSION MEDICATION HISTORY
Pharmacy -- Admission Medication Reconciliation    Prior to admission (PTA) medications were reviewed and the patient's PTA medication list was updated.    Sources Consulted: [facility] refill history, care everywhere, sure scripts, chart review, [facility] nurse Shruthi phone interview [phone], wife Nasra phone interview    The reliability of this Medication Reconciliation is: Reliability: Reliable    The following significant changes were made:        Added directions to amlodipine    Added vitamin d    Added B12 injection    Added acetaminophen    Added ntg        Updated pravastatin dose/directions    Updated gabapentin dose/directions    Updated lisinopril dose/directions    Updated loperamide directions    Updated metoprolol dose/directions    Updated Novolog directions    Updated Lantus directions    Updated metoprolol dose/directions        Removed Humalog    Removed tylenol #3    Removed glucagon    Removed ibuprofen    Removed xeloda    Removed iron    Removed fish oil    Removed loperamide    Removed meloxicam    Removed metformin    Removed prednisone    Removed prochlorperazine       In addition, the patient's allergies were reviewed with the patient and updated as follows:   Allergies: Penicillins    The pharmacist has reviewed with the patient that all personal medications should be removed from the building or locked in the belongings safe.  Patient shall only take medications ordered by the physician and administered by the nursing staff.       Medication barriers identified: [facility] RN Shruthi sets up medications every other week   Medication adherence concerns: none   Understanding of emergency medications: yes, has supplies on hand per spouse    Jina King RPH, 11/30/2022,  9:28 AM

## 2022-11-30 NOTE — H&P
[facility]    History and Physical - Hospitalist Service       Date of Admission:  11/30/2022    Assessment & Plan      Jasson Dougherty is a 82 year old male admitted on 11/30/2022. He presents with acute dyspnea and influenza A.    Principal Problem:    Acute respiratory failure with hypoxia (H)  Assessment: present on admission, secondary to influenza. 87% on room air, requiring 3 liters of oxygen.   Plan: Admit, supplemental oxygen. Tamiflu day 1    Active Problems:    Rectal cancer (H)      Type 2 diabetes mellitus without complication (H)  Assessment: chronic  Plan: continue glargine and novolog      Sleep apnea  Assessment: chronic      HTN (hypertension)  Assessment: chronic  Plan: continue lisinopril, metoprolol      Influenza A  Assessment: present on admission  Plan: tamiflu day 1    # Hypomagnesemia: Lowest Mg = 1.2 mg/dL (Ref range: 1.7-2.3) in last 2 days, will replace as needed   # Hypoalbuminemia: Lowest albumin = 3.2 g/dL (Ref range: 3.5-5.2) at 11/30/2022  3:15 AM, will monitor as appropriate     # Hypertension: home medication list includes antihypertensive(s)        Diet: Combination Diet Regular Diet Adult    DVT Prophylaxis: Pneumatic Compression Devices  English Catheter: Not present  Central Lines: None  Cardiac Monitoring: None  Code Status: Full Code        Disposition Plan      Expected Discharge Date: 12/02/2022                The patient's care was discussed with the Patient.    Vivek Saucedo MD  Hospitalist Service  [facility]  Securely message with the Vocera Web Console (learn more here)  Text page via [employer] Paging/Directory         ______________________________________________________________________    Chief Complaint   Dyspnea     History is obtained from the patient    History of Present Illness   Jasson Dougherty is a 82 year old male who presents to the emergency department with acute onset shortness of breath, fever.  Symptoms  started early morning of admission.  He feels significantly weak.  Patient has a history of coronary artery disease, rectal cancer and stroke with some left-sided weakness.    Work-up in the emergency department shows that he is hemodynamically stable.  He is febrile to 102 and requiring 3 L of oxygen to maintain O2 sats over 90%.  He was 87% on room air.  Laboratory studies show influenza and he is admitted for further management.    Review of Systems    CONSTITUTIONAL:POSITIVE  for chills and fever   INTEGUMENTARY/SKIN: NEGATIVE for worrisome rashes, moles or lesions  EYES: NEGATIVE for vision changes or irritation  ENT/MOUTH: NEGATIVE for ear, mouth and throat problems  RESP:POSITIVE for dyspnea on exertion and SOB/dyspnea  CV: NEGATIVE for chest pain, palpitations or peripheral edema  GI: NEGATIVE for nausea, abdominal pain, heartburn, or change in bowel habits  : NEGATIVE for frequency, dysuria, or hematuria  MUSCULOSKELETAL: NEGATIVE for significant arthralgias or myalgia  NEURO: POSITIVE for weakness generalized  ENDOCRINE: NEGATIVE for temperature intolerance, skin/hair changes  HEME: NEGATIVE for bleeding problems  PSYCHIATRIC: NEGATIVE for changes in mood or affect    Past Medical History    I have reviewed this patient's medical history and updated it with pertinent information if needed.   History reviewed. No pertinent past medical history.    Past Surgical History   I have reviewed this patient's surgical history and updated it with pertinent information if needed.  Past Surgical History:   Procedure Laterality Date     APPENDECTOMY OPEN      No Comments Provided     BACK SURGERY      fusion     COLONOSCOPY      2015     OTHER SURGICAL HISTORY      [id number],HEMICOLECTOMY     OTHER SURGICAL HISTORY      SUR85,ANKLE ARTHROSCOPY       Social History   I have reviewed this patient's social history and updated it with pertinent information if needed.  Social History     Tobacco Use     Smoking status:  Former     Types: Cigarettes     Quit date: 1995     Years since quittin.9     Smokeless tobacco: Never   Substance Use Topics     Alcohol use: No     Alcohol/week: 0.0 standard drinks     Drug use: Unknown     Types: Other     Comment: Drug use: No       Family History   Did not obtain    Prior to Admission Medications   Prior to Admission Medications   Prescriptions Last Dose Informant Patient Reported? Taking?   Blood Glucose Monitoring Suppl (FIFTY50 GLUCOSE METER 2.0) W/DEVICE KIT NA at NA  Yes No   Blood Glucose Monitoring Suppl (ONE TOUCH BASIC SYSTEM) W/DEVICE KIT NA at NA  Yes No   ONE TOUCH LANCETS MISC NA at NA  Yes No   Vitamin D, Cholecalciferol, 25 MCG (1000 UT) TABS 2022 at PM  Yes Yes   Sig: Take 4,000 Units by mouth daily   acetaminophen (TYLENOL) 500 MG tablet 2022 at AM  Yes Yes   Sig: Take 1,000 mg by mouth 2 times daily   amLODIPine (NORVASC) 10 MG tablet 2022 at AM  Yes Yes   Sig: Take 10 mg by mouth daily   aspirin EC 81 MG EC tablet 2022 at PM  Yes No   Sig: Take 81 mg by mouth daily with food   blood glucose (NO BRAND SPECIFIED) test strip NA at NA  Yes No   Sig: check sugars once daily before meals   cyanocobalamin (CYANOCOBALAMIN) 1000 MCG/ML injection Past Month at AM  Yes Yes   Sig: Inject 1 mL into the muscle every 30 days   gabapentin (NEURONTIN) 300 MG capsule 2022 at AM  Yes Yes   Sig: Take 900 mg by mouth 2 times daily   hydrochlorothiazide (HYDRODIURIL) 25 MG tablet 2022 at AM  Yes Yes   Sig: Take 25 mg by mouth daily   insulin aspart (NOVOLOG PEN) 100 UNIT/ML injection 2022 at AM  Yes Yes   Sig: Inject 20 Units Subcutaneous 3 times daily (with meals) Give within 15 minutes before or after meals/snacks.   insulin glargine (LANTUS PEN) 100 UNIT/ML pen 2022 at AM  Yes Yes   Sig: Inject 40 Units Subcutaneous 2 times daily   insulin pen needle 32G X 8 MM NA at NA  Yes No   Si each   lancets 28G MISC NA at NA  Yes No    lisinopril (ZESTRIL) 40 MG tablet 11/29/2022 at AM  Yes Yes   Sig: Take 40 mg by mouth daily   metoprolol tartrate (LOPRESSOR) 50 MG tablet 11/29/2022 at AM  Yes Yes   Sig: Take 25 mg by mouth 2 times daily   nitroGLYcerin (NITROSTAT) 0.4 MG sublingual tablet Unknown  Yes Yes   Sig: Place 0.4 mg under the tongue every 5 minutes as needed for chest pain For chest pain place 1 tablet under the tongue every 5 minutes for 3 doses. If symptoms persist 5 minutes after 1st dose call 911.   omeprazole 20 MG tablet 11/29/2022 at AM  Yes Yes   Sig: Take 20 mg by mouth daily   pravastatin (PRAVACHOL) 40 MG tablet 11/28/2022 at PM  Yes No   Sig: Take 40 mg by mouth daily   tamsulosin (FLOMAX) 0.4 MG capsule 11/29/2022 at AM  Yes Yes   Sig: Take 0.4 mg by mouth daily      Facility-Administered Medications: None     Allergies   Allergies   Allergen Reactions     Penicillins Rash and Unknown       Physical Exam   Vital Signs: Temp: 97.1  F (36.2  C) Temp src: Tympanic BP: (!) 120/35 Pulse: 69   Resp: 20 SpO2: 94 % O2 Device: Nasal cannula Oxygen Delivery: 1 LPM  Weight: 239 lbs 3.2 oz    Constitutional: In no apparent distress  Eyes: pupils reactive, extraocular movements intact. Anicteric sclera.   HEENT: Oropharynx nonerythematous. Neck supple, no JVD.  Respiratory: no crackles noted, no wheezes.  Cardiovascular: regular, no murmur. no lower extremity edema.  GI: soft, non-tender, bowel sounds present.  Lymph/Hematologic: no cervical or supraclavicular LAD.  Genitourinary: deferred  Skin: no rashes, or sores  Musculoskeletal: no joint erythema or swelling  Neurologic: cranial nerves symmetric. Neuro exam nonfocal  Psychiatric: alert and oriented x3. Interactive.       Data   Data reviewed today: I reviewed all medications, new labs and imaging results over the last 24 hours. I personally reviewed the chest x-ray image(s) showing no obvious infiltrate.    Recent Labs   Lab 11/30/22  1642 11/30/22  1354 11/30/22  0315   WBC  --    --  10.2   HGB  --   --  13.2*   MCV  --   --  89   PLT  --   --  231   INR  --   --  1.14   NA  --   --  136   POTASSIUM  --   --  4.8   CHLORIDE  --   --  104   CO2  --   --  18*   BUN  --   --  41.9*   CR  --   --  1.65*   ANIONGAP  --   --  14   YOVANA  --   --  8.8   * 83 85   ALBUMIN  --   --  3.2*   PROTTOTAL  --   --  6.4   BILITOTAL  --   --  0.3   ALKPHOS  --   --  76   ALT  --   --  20   AST  --   --  14     Recent Results (from the past 24 hour(s))   XR Chest Port 1 View    Narrative    PROCEDURE INFORMATION:   Exam: XR Chest   Exam date and time: 11/30/2022 3:33 AM   Age: 82 years old   Clinical indication: Other: Fever     TECHNIQUE:   Imaging protocol: Radiologic exam of the chest.   Views: 1 view.     COMPARISON:   CR XR CHEST PORT 1 VIEW 5/16/2022 7:00 PM     FINDINGS:   Lungs:  There is central bronchovascular crowding likely due to portable   technique. No consolidation.   Pleural spaces: Unremarkable. No pleural effusion. No pneumothorax.   Heart/Mediastinum: Unremarkable. No cardiomegaly.   Vasculature: There is aortic knob calcifications   Bones/joints: Unremarkable.       Impression    IMPRESSION:   No focal lung consolidation.    THIS DOCUMENT HAS BEEN ELECTRONICALLY SIGNED BY MARISSA MARTINEZ MD

## 2022-11-30 NOTE — ED PROVIDER NOTES
History     Chief Complaint   Patient presents with     Fever     weakness     The history is provided by the patient and medical records.     Jasson Dougherty is a 82 year old male here with fever and weakness. He has been sick for a few days but today he could not get up due to weakness. He does not feel short of breath.  He took Tylenol at about 11:30 PM.     He has a history of CVA with left hemiparesis, heart disease including HTN, STEMI, rectal cancer, type 2 DM, reflux, sleep apnea.  He has had severe sepsis in the past.     Allergies:  Allergies   Allergen Reactions     Penicillins Rash and Unknown       Problem List:    Patient Active Problem List    Diagnosis Date Noted     Influenza A 11/30/2022     Priority: Medium     Hypoxia 11/30/2022     Priority: Medium     Fever in other diseases 11/30/2022     Priority: Medium     Right shoulder pain 06/01/2016     Priority: Medium     Primary osteoarthritis of left wrist 04/12/2016     Priority: Medium     Carotid stenosis, right 01/11/2016     Priority: Medium     Cerebrovascular accident (CVA) due to embolism of right middle cerebral artery (H) 01/11/2016     Priority: Medium     CVA, old, monoplegia upper limb 12/23/2015     Priority: Medium     Malignant neoplasm of descending colon (H) 12/23/2015     Priority: Medium     MI, old 12/23/2015     Priority: Medium     Stenosis of right carotid artery 12/23/2015     Priority: Medium     Sleep apnea 08/05/2015     Priority: Medium     Rectal cancer (H) 06/29/2015     Priority: Medium     Arthrodesis status 06/26/2015     Priority: Medium     Overview:   [facility]       Morbid obesity (H) 06/26/2015     Priority: Medium     Overview:   Complicated by severe DJD in back and LEs, Diabetes, [place], GERD, Hypertension.       Malignant neoplasm of rectum (H) 06/26/2015     Priority: Medium     Routine general medical examination at a health care facility 06/07/2013     Priority: Medium     Type 2  diabetes mellitus without complication (H) 2012     Priority: Medium     Overview:   [facility]       Backache 2012     Priority: Medium     Hypercholesteremia 2009     Priority: Medium     Overview:   IMO Update 10/11       Esophageal reflux 2004     Priority: Medium     Overview:   [place]       HTN (hypertension) 2004     Priority: Medium     Overview:   HTN          Past Medical History:    History reviewed. No pertinent past medical history.    Past Surgical History:    Past Surgical History:   Procedure Laterality Date     APPENDECTOMY OPEN      No Comments Provided     BACK SURGERY      fusion     COLONOSCOPY           OTHER SURGICAL HISTORY      NEV003,HEMICOLECTOMY     OTHER SURGICAL HISTORY      SUR85,ANKLE ARTHROSCOPY       Family History:    History reviewed. No pertinent family history.    Social History:  Marital Status:   [2]  Social History     Tobacco Use     Smoking status: Former     Types: Cigarettes     Quit date: 1995     Years since quittin.9     Smokeless tobacco: Never   Substance Use Topics     Alcohol use: No     Alcohol/week: 0.0 standard drinks     Drug use: Unknown     Types: Other     Comment: Drug use: No        Medications:    amLODIPine (NORVASC) 10 MG tablet  aspirin EC 81 MG EC tablet  blood glucose (NO BRAND SPECIFIED) test strip  Blood Glucose Monitoring Suppl (FIFTY50 GLUCOSE METER 2.0) W/DEVICE KIT  Blood Glucose Monitoring Suppl (ONE TOUCH BASIC SYSTEM) W/DEVICE KIT  gabapentin (NEURONTIN) 300 MG capsule  hydrochlorothiazide (HYDRODIURIL) 25 MG tablet  insulin aspart (NOVOLOG PEN) 100 UNIT/ML injection  insulin glargine (LANTUS SOLOSTAR) 100 UNIT/ML PEN  insulin pen needle 32G X 8 MM  lancets 28G MISC  lisinopril (ZESTRIL) 40 MG tablet  metoprolol tartrate (LOPRESSOR) 50 MG tablet  omeprazole (RA OMEPRAZOLE) 20 MG tablet  ONE TOUCH LANCETS MISC  pravastatin (PRAVACHOL) 40 MG tablet  tamsulosin (FLOMAX) 0.4 MG 24 hr  capsule      Review of Systems   Constitutional: Positive for fever.   Respiratory: Negative for cough and shortness of breath.    Neurological: Positive for weakness.   All other systems reviewed and are negative.      Physical Exam   BP: (!) 145/68  Pulse: 103  Temp: (!) 102  F (38.9  C)  Resp: 24  Height: 152.4 cm (5')  Weight: 105.7 kg (233 lb)  SpO2: (!) 87 %      Physical Exam  Vitals and nursing note reviewed.   Constitutional:       General: He is not in acute distress.     Appearance: Normal appearance. He is ill-appearing. He is not toxic-appearing or diaphoretic.   Cardiovascular:      Rate and Rhythm: Regular rhythm. Tachycardia present.      Pulses: Normal pulses.   Pulmonary:      Effort: Pulmonary effort is normal.      Breath sounds: No stridor. Rhonchi and rales present.   Abdominal:      General: Bowel sounds are normal.      Palpations: Abdomen is soft.      Tenderness: There is no abdominal tenderness.   Musculoskeletal:      Right lower leg: No edema.      Left lower leg: No edema.   Skin:     General: Skin is warm and dry.      Comments: He feels feverish   Neurological:      General: No focal deficit present.      Mental Status: He is alert and oriented to person, place, and time.   Psychiatric:         Mood and Affect: Mood normal.         Behavior: Behavior normal.         Results for orders placed or performed during the hospital encounter of 11/30/22 (from the past 24 hour(s))   Asymptomatic Influenza A/B & SARS-CoV2 (COVID-19) Virus PCR Multiplex Nose    Specimen: Nose; Swab   Result Value Ref Range    Influenza A PCR Positive (A) Negative    Influenza B PCR Negative Negative    RSV PCR Negative Negative    SARS CoV2 PCR Negative Negative    Narrative    Testing was performed using the Xpert Xpress CoV2/Flu/RSV Assay on the Cepheid GeneXpert Instrument. This test should be ordered for the detection of SARS-CoV-2 and influenza viruses in individuals who meet clinical and/or epidemiological  criteria. Test performance is unknown in asymptomatic patients. This test is for in vitro diagnostic use under the FDA EUA for laboratories certified under CLIA to perform high or moderate complexity testing. This test has not been FDA cleared or approved. A negative result does not rule out the presence of PCR inhibitors in the specimen or target RNA in concentration below the limit of detection for the assay. If only one viral target is positive but coinfection with multiple targets is suspected, the sample should be re-tested with another FDA cleared, approved, or authorized test, if coinfection would change clinical management. This test was validated by the [facility]. These laboratories are certified under the Clinical Laboratory Improvement Amendments of 1988 (CLIA-88) as qualified to perform high complexity laboratory testing.   CBC with platelets differential    Narrative    The following orders were created for panel order CBC with platelets differential.  Procedure                               Abnormality         Status                     ---------                               -----------         ------                     CBC with platelets and d...[[id number]]  Abnormal            Final result                 Please view results for these tests on the individual orders.   Comprehensive metabolic panel   Result Value Ref Range    Sodium 136 136 - 145 mmol/L    Potassium 4.8 3.4 - 5.3 mmol/L    Chloride 104 98 - 107 mmol/L    Carbon Dioxide (CO2) 18 (L) 22 - 29 mmol/L    Anion Gap 14 7 - 15 mmol/L    Urea Nitrogen 41.9 (H) 8.0 - 23.0 mg/dL    Creatinine 1.65 (H) 0.67 - 1.17 mg/dL    Calcium 8.8 8.8 - 10.2 mg/dL    Glucose 85 70 - 99 mg/dL    Alkaline Phosphatase 76 40 - 129 U/L    AST 14 10 - 50 U/L    ALT 20 10 - 50 U/L    Protein Total 6.4 6.4 - 8.3 g/dL    Albumin 3.2 (L) 3.5 - 5.2 g/dL    Bilirubin Total 0.3 <=1.2 mg/dL    GFR Estimate 41 (L) >60 mL/min/1.73m2   Lactic acid whole  blood   Result Value Ref Range    Lactic Acid 1.5 0.7 - 2.0 mmol/L   Blood gas venous and oxyhgb   Result Value Ref Range    pH Venous 7.33 7.32 - 7.43    pCO2 Venous 38 (L) 40 - 50 mm Hg    pO2 Venous 40 25 - 47 mm Hg    Bicarbonate Venous 20 (L) 21 - 28 mmol/L    FIO2 24     Oxyhemoglobin Venous 73 70 - 75 %    Base Excess/Deficit (+/-) -5.5 -7.7 - 1.9 mmol/L   INR   Result Value Ref Range    INR 1.14 0.85 - 1.15   Partial thromboplastin time   Result Value Ref Range    aPTT 28 22 - 38 Seconds   NT pro BNP   Result Value Ref Range    N terminal Pro BNP Inpatient 920 0 - 1,800 pg/mL   CBC with platelets and differential   Result Value Ref Range    WBC Count 10.2 4.0 - 11.0 10e3/uL    RBC Count 4.69 4.40 - 5.90 10e6/uL    Hemoglobin 13.2 (L) 13.3 - 17.7 g/dL    Hematocrit 41.5 40.0 - 53.0 %    MCV 89 78 - 100 fL    MCH 28.1 26.5 - 33.0 pg    MCHC 31.8 31.5 - 36.5 g/dL    RDW 14.6 10.0 - 15.0 %    Platelet Count 231 150 - 450 10e3/uL    % Neutrophils 86 %    % Lymphocytes 4 %    % Monocytes 8 %    % Eosinophils 1 %    % Basophils 0 %    % Immature Granulocytes 1 %    NRBCs per 100 WBC 0 <1 /100    Absolute Neutrophils 8.8 (H) 1.6 - 8.3 10e3/uL    Absolute Lymphocytes 0.4 (L) 0.8 - 5.3 10e3/uL    Absolute Monocytes 0.8 0.0 - 1.3 10e3/uL    Absolute Eosinophils 0.1 0.0 - 0.7 10e3/uL    Absolute Basophils 0.0 0.0 - 0.2 10e3/uL    Absolute Immature Granulocytes 0.1 <=0.4 10e3/uL    Absolute NRBCs 0.0 10e3/uL   Extra Tube    Narrative    The following orders were created for panel order Extra Tube.  Procedure                               Abnormality         Status                     ---------                               -----------         ------                     Extra Serum Separator Tu...[[id number]]                      Final result                 Please view results for these tests on the individual orders.   Extra Serum Separator Tube (SST)   Result Value Ref Range    Hold Specimen JIC    XR Chest Port 1 View     Narrative    PROCEDURE INFORMATION:   Exam: XR Chest   Exam date and time: 11/30/2022 3:33 AM   Age: 82 years old   Clinical indication: Other: Fever     TECHNIQUE:   Imaging protocol: Radiologic exam of the chest.   Views: 1 view.     COMPARISON:   CR XR CHEST PORT 1 VIEW 5/16/2022 7:00 PM     FINDINGS:   Lungs:  There is central bronchovascular crowding likely due to portable   technique. No consolidation.   Pleural spaces: Unremarkable. No pleural effusion. No pneumothorax.   Heart/Mediastinum: Unremarkable. No cardiomegaly.   Vasculature: There is aortic knob calcifications   Bones/joints: Unremarkable.       Impression    IMPRESSION:   No focal lung consolidation.    THIS DOCUMENT HAS BEEN ELECTRONICALLY SIGNED BY MARISSA MARTINEZ MD   UA reflex to Microscopic and Culture    Specimen: Urine, Clean Catch   Result Value Ref Range    Color Urine Light Yellow Colorless, Straw, Light Yellow, Yellow    Appearance Urine Clear Clear    Glucose Urine Negative Negative mg/dL    Bilirubin Urine Negative Negative    Ketones Urine Negative Negative mg/dL    Specific Gravity Urine 1.021 1.000 - 1.030    Blood Urine Negative Negative    pH Urine 5.5 5.0 - 9.0    Protein Albumin Urine 100 (A) Negative mg/dL    Urobilinogen Urine Normal Normal, 2.0 mg/dL    Nitrite Urine Negative Negative    Leukocyte Esterase Urine Negative Negative    Mucus Urine Present (A) None Seen /LPF    RBC Urine 1 <=2 /HPF    WBC Urine 1 <=5 /HPF    Narrative    Urine Culture not indicated       Medications   sodium chloride (PF) 0.9% PF flush 3 mL (has no administration in time range)   sodium chloride (PF) 0.9% PF flush 3 mL (3 mLs Intracatheter Given 11/30/22 1540)   sodium chloride 0.9% infusion ( Intravenous New Bag 11/30/22 8859)   amLODIPine (NORVASC) tablet 10 mg (has no administration in time range)   aspirin EC tablet 81 mg (has no administration in time range)   gabapentin (NEURONTIN) capsule 300 mg (has no administration in time range)    hydrochlorothiazide (HYDRODIURIL) tablet 25 mg (has no administration in time range)   insulin glargine (LANTUS PEN) injection 36 Units (has no administration in time range)   lisinopril (ZESTRIL) tablet 40 mg (has no administration in time range)   metoprolol tartrate (LOPRESSOR) tablet 50 mg (has no administration in time range)   pantoprazole (PROTONIX) EC tablet 40 mg (has no administration in time range)   atorvastatin (LIPITOR) tablet 10 mg (has no administration in time range)   acetaminophen (TYLENOL) tablet 1,000 mg (has no administration in time range)   oseltamivir (TAMIFLU) capsule 30 mg (has no administration in time range)   0.9% sodium chloride BOLUS (0 mLs Intravenous Stopped 11/30/22 0403)   cefTRIAXone IN D5W (ROCEPHIN) intermittent infusion 2 g (0 g Intravenous Stopped 11/30/22 0401)   acetaminophen (TYLENOL) tablet 1,000 mg (1,000 mg Oral Given 11/30/22 0305)   ketorolac (TORADOL) injection 15 mg (15 mg Intravenous Given 11/30/22 0323)       Assessments & Plan (with Medical Decision Making)  Jasson Dougherty is a 82 year old male here with fever and weakness. He has been sick for a few days but today he could not get up due to weakness. He does not feel short of breath.  He took Tylenol at about 11:30 PM.  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  He has had severe sepsis in the past.  VS in the ED /45   Pulse 70   Temp (!) 102  F (38.9  C) (Tympanic)   Resp 23   Ht 1.524 m (5')   Wt 105.7 kg (233 lb)   SpO2 96%   BMI 45.50 kg/m    We gave Tylenol and Toradol for fever. We started IV fluids. I think he is septic so we did use the sepsis protocol.  Labs show CBC with normal WBC, hgb 13.2, platelets normal, CMP with Cr 1.65 (mildly worse), VBG with pCO2 38, bicarb 20, lactic acid normal, BNP normal, INR normal, aPTT normal, 4 Plex positive for influenza A, UA does not show  UTI.  BC x 2 and UC pending. Chest xray stable.  Once his 4 Plex came back positive for influenza A we started Tamiflu and discontinued antibiotics. We will make him a Boarder with a plan to admit him in the morning when they have a bed. I did complete his boarding orders.  9:16 AM  He is sleeping. VS at this time /65, P 63, oxygen 95% on 3 L oxygen by NC. He has azithromycin running and IV fluids.  9:41 AM  Dr Saucedo is here to accept him to the floor.      I have reviewed the nursing notes.      Final diagnoses:   Influenza A   Hypoxia   Fever in other diseases       11/30/2022   [facility] AND [facility], Jose Suarez MD  11/30/22 5527

## 2022-11-30 NOTE — ED TRIAGE NOTES
To ER via [facility] ambulance from home with concerns of extreme weakness, cough and fever.      Triage Assessment     Row Name 11/30/22 0251       Triage Assessment (Adult)    Airway WDL WDL    Additional Documentation Breath Sounds (Group)       Respiratory WDL    Respiratory WDL X;cough;expansion/retractions    Expansion/Accessory Muscles/Retractions abdominal muscle use    Cough Frequency frequent    Cough Type fair       Breath Sounds    Breath Sounds All Fields    All Lung Fields Breath Sounds Anterior:;wheezes, inspiratory;wheezes, expiratory       Cardiac WDL    Cardiac WDL X;rhythm    Pulse Rate & Regularity tachycardic       Peripheral/Neurovascular WDL    Peripheral Neurovascular WDL WDL       Cognitive/Neuro/Behavioral WDL    Cognitive/Neuro/Behavioral WDL WDL

## 2022-12-01 ENCOUNTER — APPOINTMENT (OUTPATIENT)
Dept: CARDIOLOGY | Facility: OTHER | Age: 82
DRG: 193 | End: 2022-12-01
Attending: INTERNAL MEDICINE
Payer: COMMERCIAL

## 2022-12-01 LAB
ANION GAP SERPL CALCULATED.3IONS-SCNC: 12 MMOL/L (ref 7–15)
BACTERIA UR CULT: NORMAL
BUN SERPL-MCNC: 29.4 MG/DL (ref 8–23)
CALCIUM SERPL-MCNC: 8.5 MG/DL (ref 8.8–10.2)
CHLORIDE SERPL-SCNC: 106 MMOL/L (ref 98–107)
CREAT SERPL-MCNC: 1.32 MG/DL (ref 0.67–1.17)
DEPRECATED HCO3 PLAS-SCNC: 19 MMOL/L (ref 22–29)
ERYTHROCYTE [DISTWIDTH] IN BLOOD BY AUTOMATED COUNT: 14.8 % (ref 10–15)
GFR SERPL CREATININE-BSD FRML MDRD: 53 ML/MIN/1.73M2
GLUCOSE BLDC GLUCOMTR-MCNC: 122 MG/DL (ref 70–99)
GLUCOSE BLDC GLUCOMTR-MCNC: 156 MG/DL (ref 70–99)
GLUCOSE BLDC GLUCOMTR-MCNC: 186 MG/DL (ref 70–99)
GLUCOSE BLDC GLUCOMTR-MCNC: 189 MG/DL (ref 70–99)
GLUCOSE BLDC GLUCOMTR-MCNC: 208 MG/DL (ref 70–99)
GLUCOSE BLDC GLUCOMTR-MCNC: 232 MG/DL (ref 70–99)
GLUCOSE BLDC GLUCOMTR-MCNC: 69 MG/DL (ref 70–99)
GLUCOSE SERPL-MCNC: 117 MG/DL (ref 70–99)
HCT VFR BLD AUTO: 40.7 % (ref 40–53)
HGB BLD-MCNC: 12.5 G/DL (ref 13.3–17.7)
HOLD SPECIMEN: NORMAL
HOLD SPECIMEN: NORMAL
LACTATE SERPL-SCNC: 1.1 MMOL/L (ref 0.7–2)
LACTATE SERPL-SCNC: 1.2 MMOL/L (ref 0.7–2)
LVEF ECHO: NORMAL
MAGNESIUM SERPL-MCNC: 2.4 MG/DL (ref 1.7–2.3)
MAGNESIUM SERPL-MCNC: 2.4 MG/DL (ref 1.7–2.3)
MCH RBC QN AUTO: 27.9 PG (ref 26.5–33)
MCHC RBC AUTO-ENTMCNC: 30.7 G/DL (ref 31.5–36.5)
MCV RBC AUTO: 91 FL (ref 78–100)
PLATELET # BLD AUTO: 226 10E3/UL (ref 150–450)
POTASSIUM SERPL-SCNC: 4.6 MMOL/L (ref 3.4–5.3)
RBC # BLD AUTO: 4.48 10E6/UL (ref 4.4–5.9)
SODIUM SERPL-SCNC: 137 MMOL/L (ref 136–145)
WBC # BLD AUTO: 11.1 10E3/UL (ref 4–11)

## 2022-12-01 PROCEDURE — 85027 COMPLETE CBC AUTOMATED: CPT | Performed by: INTERNAL MEDICINE

## 2022-12-01 PROCEDURE — 99232 SBSQ HOSP IP/OBS MODERATE 35: CPT | Performed by: INTERNAL MEDICINE

## 2022-12-01 PROCEDURE — 258N000003 HC RX IP 258 OP 636: Performed by: INTERNAL MEDICINE

## 2022-12-01 PROCEDURE — 999N000157 HC STATISTIC RCP TIME EA 10 MIN

## 2022-12-01 PROCEDURE — 250N000011 HC RX IP 250 OP 636: Performed by: INTERNAL MEDICINE

## 2022-12-01 PROCEDURE — 120N000001 HC R&B MED SURG/OB

## 2022-12-01 PROCEDURE — 83735 ASSAY OF MAGNESIUM: CPT | Performed by: INTERNAL MEDICINE

## 2022-12-01 PROCEDURE — 36415 COLL VENOUS BLD VENIPUNCTURE: CPT | Performed by: INTERNAL MEDICINE

## 2022-12-01 PROCEDURE — 83605 ASSAY OF LACTIC ACID: CPT | Performed by: INTERNAL MEDICINE

## 2022-12-01 PROCEDURE — 83605 ASSAY OF LACTIC ACID: CPT | Performed by: FAMILY MEDICINE

## 2022-12-01 PROCEDURE — 36415 COLL VENOUS BLD VENIPUNCTURE: CPT | Performed by: FAMILY MEDICINE

## 2022-12-01 PROCEDURE — 80048 BASIC METABOLIC PNL TOTAL CA: CPT | Performed by: INTERNAL MEDICINE

## 2022-12-01 PROCEDURE — 87040 BLOOD CULTURE FOR BACTERIA: CPT | Performed by: INTERNAL MEDICINE

## 2022-12-01 PROCEDURE — 250N000013 HC RX MED GY IP 250 OP 250 PS 637: Performed by: INTERNAL MEDICINE

## 2022-12-01 PROCEDURE — 93306 TTE W/DOPPLER COMPLETE: CPT

## 2022-12-01 PROCEDURE — 93306 TTE W/DOPPLER COMPLETE: CPT | Mod: 26 | Performed by: INTERNAL MEDICINE

## 2022-12-01 RX ORDER — GABAPENTIN 300 MG/1
900 CAPSULE ORAL 2 TIMES DAILY
Status: DISCONTINUED | OUTPATIENT
Start: 2022-12-01 | End: 2022-12-03 | Stop reason: HOSPADM

## 2022-12-01 RX ORDER — GABAPENTIN 600 MG/1
600 TABLET ORAL ONCE
Status: COMPLETED | OUTPATIENT
Start: 2022-12-01 | End: 2022-12-01

## 2022-12-01 RX ORDER — CEFTRIAXONE SODIUM 1 G/50ML
1 INJECTION, SOLUTION INTRAVENOUS EVERY 24 HOURS
Status: DISCONTINUED | OUTPATIENT
Start: 2022-12-01 | End: 2022-12-01

## 2022-12-01 RX ORDER — CEFAZOLIN SODIUM 1 G/50ML
2000 SOLUTION INTRAVENOUS ONCE
Status: COMPLETED | OUTPATIENT
Start: 2022-12-01 | End: 2022-12-01

## 2022-12-01 RX ADMIN — VANCOMYCIN HYDROCHLORIDE 2000 MG: 1 INJECTION, POWDER, LYOPHILIZED, FOR SOLUTION INTRAVENOUS at 08:23

## 2022-12-01 RX ADMIN — ASPIRIN 81 MG: 81 TABLET, COATED ORAL at 21:55

## 2022-12-01 RX ADMIN — OSELTAMIVIR PHOSPHATE 30 MG: 30 CAPSULE ORAL at 10:54

## 2022-12-01 RX ADMIN — TAMSULOSIN HYDROCHLORIDE 0.4 MG: 0.4 CAPSULE ORAL at 10:54

## 2022-12-01 RX ADMIN — METOPROLOL TARTRATE 25 MG: 25 TABLET, FILM COATED ORAL at 21:55

## 2022-12-01 RX ADMIN — CEFTRIAXONE SODIUM 2 G: 2 INJECTION, SOLUTION INTRAVENOUS at 22:34

## 2022-12-01 RX ADMIN — HYDROCHLOROTHIAZIDE 25 MG: 25 TABLET ORAL at 10:54

## 2022-12-01 RX ADMIN — ATORVASTATIN CALCIUM 10 MG: 10 TABLET, FILM COATED ORAL at 21:55

## 2022-12-01 RX ADMIN — SODIUM CHLORIDE: 9 INJECTION, SOLUTION INTRAVENOUS at 04:22

## 2022-12-01 RX ADMIN — SODIUM CHLORIDE: 9 INJECTION, SOLUTION INTRAVENOUS at 21:20

## 2022-12-01 RX ADMIN — GABAPENTIN 900 MG: 300 CAPSULE ORAL at 21:55

## 2022-12-01 RX ADMIN — AMLODIPINE BESYLATE 10 MG: 10 TABLET ORAL at 10:54

## 2022-12-01 RX ADMIN — OSELTAMIVIR PHOSPHATE 30 MG: 30 CAPSULE ORAL at 21:55

## 2022-12-01 RX ADMIN — GABAPENTIN 600 MG: 600 TABLET, FILM COATED ORAL at 10:53

## 2022-12-01 RX ADMIN — AZITHROMYCIN 500 MG: 500 INJECTION, POWDER, LYOPHILIZED, FOR SOLUTION INTRAVENOUS at 15:55

## 2022-12-01 RX ADMIN — LISINOPRIL 40 MG: 40 TABLET ORAL at 08:27

## 2022-12-01 RX ADMIN — ACETAMINOPHEN 975 MG: 325 TABLET ORAL at 08:26

## 2022-12-01 RX ADMIN — PANTOPRAZOLE SODIUM 40 MG: 40 TABLET, DELAYED RELEASE ORAL at 10:54

## 2022-12-01 RX ADMIN — METOPROLOL TARTRATE 25 MG: 25 TABLET, FILM COATED ORAL at 10:53

## 2022-12-01 RX ADMIN — GABAPENTIN 300 MG: 300 CAPSULE ORAL at 08:27

## 2022-12-01 ASSESSMENT — ACTIVITIES OF DAILY LIVING (ADL)
ADLS_ACUITY_SCORE: 35
ADLS_ACUITY_SCORE: 35
ADLS_ACUITY_SCORE: 29
ADLS_ACUITY_SCORE: 35
ADLS_ACUITY_SCORE: 30
ADLS_ACUITY_SCORE: 35

## 2022-12-01 NOTE — PHARMACY-VANCOMYCIN DOSING SERVICE
Pharmacy Vancomycin Initial Note  Date of Service 2022  Patient's  1940  82 year old, male    Indication: Sepsis    Current estimated CrCl = Estimated Creatinine Clearance: 49.8 mL/min (A) (based on SCr of 1.32 mg/dL (H)).    Creatinine for last 3 days  2022:  3:15 AM Creatinine 1.65 mg/dL  2022:  4:50 AM Creatinine 1.32 mg/dL    Recent Vancomycin Level(s) for last 3 days  No results found for requested labs within last 72 hours.      Vancomycin IV Administrations (past 72 hours)      No vancomycin orders with administrations in past 72 hours.                Nephrotoxins and other renal medications (From now, onward)    Start     Dose/Rate Route Frequency Ordered Stop    22 0800  vancomycin (VANCOCIN) 1,250 mg in 0.9% NaCl 250 mL intermittent infusion         1,250 mg  over 90 Minutes Intravenous EVERY 24 HOURS 22 0732      22 0800  vancomycin (VANCOCIN) 2,000 mg in sodium chloride 0.9 % 500 mL intermittent infusion         2,000 mg  over 2 Hours Intravenous ONCE 22 0732      22 1115  lisinopril (ZESTRIL) tablet 40 mg         40 mg Oral DAILY 22 1110            Contrast Orders - past 72 hours (72h ago, onward)    None          InsightRX Prediction of Planned Initial Vancomycin Regimen  Loading dose: 2000 mg at 08:00 2022.  Regimen: 1250 mg IV every 24 hours.  Start time: 07:34 on 2022  Exposure target: AUC24 (range)400-600 mg/L.hr   AUC24,ss: 454 mg/L.hr  Probability of AUC24 > 400: 64 %  Ctrough,ss: 14.3 mg/L  Probability of Ctrough,ss > 20: 22 %  Probability of nephrotoxicity (Lodise SELENA ): 9 %  }        Plan:  1. Give vancomycin  2,000 mg x1 NOW (loading dose), followed by 1,250 IV q24h starting 0800 22.   2. Vancomycin monitoring method: AUC  3. Vancomycin therapeutic monitoring goal: 400-600 mg*h/L  4. Pharmacy will check vancomycin levels as appropriate in 1-3 Days.    5. Serum creatinine levels will be ordered daily for  the first week of therapy and at least twice weekly for subsequent weeks.      Taz Esquivel, [facility]

## 2022-12-01 NOTE — PLAN OF CARE
Blood glucose 69, gave peanut butter toast and milk and now 122. 2nd blood culture positive, being treated will update MD in AM.

## 2022-12-01 NOTE — PROGRESS NOTES
SAFETY CHECKLIST  ID Bands and Risk clasps correct and in place (DNR, Fall risk, Allergy, Latex, Limb):  Yes  All Lines Reconciled and labeled correctly: Yes  Whiteboard updated:Yes  Environmental interventions: Yes  Verify Tele #:   Elzbieta Love RN on 12/1/2022 at 7:54 AM

## 2022-12-01 NOTE — PROVIDER NOTIFICATION
12/01/22 0213   Valuables   Patient Belongings remains with patient   Patient Belongings Remaining with Patient clothing;watch;medical/assistive equipment  (cpap)   Did you bring any home meds/supplements to the hospital?  No   A               Admission:  I am responsible for any personal items that are not sent to the safe or pharmacy.  [facility] is not responsible for loss, theft or damage of any property in my possession.    Signature:  _________________________________ Date: _______  Time: _____                                              Staff Signature:  ____________________________ Date: ________  Time: _____      2nd Staff person, if patient is unable/unwilling to sign:    Signature: ________________________________ Date: ________  Time: _____     Discharge:  [facility] has returned all of my personal belongings:    Signature: _________________________________ Date: ________  Time: _____                                          Staff Signature:  ____________________________ Date: ________  Time: _____

## 2022-12-01 NOTE — PROGRESS NOTES
[facility]    Medicine Progress Note - Hospitalist Service    Date of Admission:  11/30/2022    Assessment & Plan      Jasson Dougherty is a 82 year old male admitted on 11/30/2022. He presents with acute dyspnea and influenza A.    Principal Problem:    Acute respiratory failure with hypoxia (H)  Assessment: present on admission, secondary to influenza. 87% on room air, requiring 3 liters of oxygen.   Plan: Continue supplemental oxygen. Tamiflu day 2    Bacteremia  Assessment: 2/2 blood culture positive for gram positive cocci. No Skin sores. Unclear source, still consider contaminant. history of colon cancer in 2016. Consider CT imaging if persistent positive cultures.   Plan: Empiric treatment with ceftriaxone and vancomycin. Repeat 2 sets of blood culture today and tomorrow. Echo to assess valves.    Active Problems:    Rectal cancer (H)      Type 2 diabetes mellitus without complication (H)  Assessment: chronic  Plan: continue glargine and novolog      Sleep apnea  Assessment: chronic      HTN (hypertension)  Assessment: chronic  Plan: continue lisinopril, metoprolol      Influenza A  Assessment: present on admission  Plan: tamiflu day 2    # Hypomagnesemia: Lowest Mg = 1.2 mg/dL (Ref range: 1.7-2.3) in last 2 days, will replace as needed   # Hypoalbuminemia: Lowest albumin = 3.2 g/dL (Ref range: 3.5-5.2) at 11/30/2022  3:15 AM, will monitor as appropriate     # Hypertension: home medication list includes antihypertensive(s)          Diet: Combination Diet Regular Diet Adult    DVT Prophylaxis: Pneumatic Compression Devices  English Catheter: Not present  Central Lines: None  Cardiac Monitoring: None  Code Status: Full Code       The patient's care was discussed with the Patient.    Vivek Saucedo MD  Hospitalist Service  [facility]  Securely message with the Vocera Web Console (learn more here)  Text page via [employer] Paging/Directory         Interval History   Feeling  OK. No fevers, chills. No nausea, vomiting.     Data reviewed today: I reviewed all medications, new labs and imaging results over the last 24 hours. I personally reviewed no images or EKG's today.    Physical Exam   Vital Signs: Temp: 97.6  F (36.4  C) Temp src: Tympanic BP: (!) 151/40 Pulse: 72   Resp: 20 SpO2: 91 % O2 Device: BiPAP/CPAP Oxygen Delivery: 1 LPM  Weight: 240 lbs 0 oz  GENERAL: Comfortable, no apparent distress.  CARDIOVASCULAR: regular rate and rhythm, no murmur. No lower extremity edema   RESPIRATORY: Clear to auscultation bilaterally, no wheezes or crackles.  GI: non-tender, non-distended, normal bowel sounds.   SKIN: warm periphery, no rashes      Data   Recent Labs   Lab 12/01/22  0726 12/01/22  0710 12/01/22  0450 11/30/22  1354 11/30/22  0315   WBC  --   --  11.1*  --  10.2   HGB  --   --  12.5*  --  13.2*   MCV  --   --  91  --  89   PLT  --   --  226  --  231   INR  --   --   --   --  1.14   NA  --   --  137  --  136   POTASSIUM  --   --  4.6  --  4.8   CHLORIDE  --   --  106  --  104   CO2  --   --  19*  --  18*   BUN  --   --  29.4*  --  41.9*   CR  --   --  1.32*  --  1.65*   ANIONGAP  --   --  12  --  14   YOVANA  --   --  8.5*  --  8.8   * 186* 117*   < > 85   ALBUMIN  --   --   --   --  3.2*   PROTTOTAL  --   --   --   --  6.4   BILITOTAL  --   --   --   --  0.3   ALKPHOS  --   --   --   --  76   ALT  --   --   --   --  20   AST  --   --   --   --  14    < > = values in this interval not displayed.     No results found for this or any previous visit (from the past 24 hour(s)).  Medications     sodium chloride 75 mL/hr at 12/01/22 0422       amLODIPine  10 mg Oral Daily     aspirin  81 mg Oral At Bedtime     atorvastatin  10 mg Oral Daily     azithromycin  500 mg Intravenous Q24H     cefTRIAXone  2 g Intravenous Q24H     gabapentin  900 mg Oral BID     gabapentin  600 mg Oral Once     hydrochlorothiazide  25 mg Oral Daily     insulin aspart  1-7 Units Subcutaneous TID AC     insulin  aspart  1-5 Units Subcutaneous At Bedtime     insulin glargine  30 Units Subcutaneous At Bedtime     lisinopril  40 mg Oral Daily     metoprolol tartrate  25 mg Oral BID     oseltamivir  30 mg Oral BID     pantoprazole  40 mg Oral Daily     sodium chloride (PF)  3 mL Intracatheter Q8H     tamsulosin  0.4 mg Oral Daily     [START ON 12/2/2022] vancomycin  1,250 mg Intravenous Q24H

## 2022-12-01 NOTE — PLAN OF CARE
Goal Outcome Evaluation:         Patient is alert, oriented, uses call light appropriately.  A1 for mobility.  Reported left shoulder pain and had tylenol to manage- fine at rest, increases with movement.  Lungs clear/dim bases, has CPAP, maintaining oxygen sats on room air, respirations even, no cough.  VSS.  Sharon Veliz RN on 12/1/2022 at 2:00 AM

## 2022-12-01 NOTE — PLAN OF CARE
Goal Outcome Evaluation:      Plan of Care Reviewed With: patient    Overall Patient Progress: improvingOverall Patient Progress: improving       Patient is alert and orientated. Weaned off of 1 L of O2 to room air and tolerating well. Patient denies any SOB. VSS, afebrile, and pain is controlled. Patient ate some lunch and has more of an appetite today. Patient still has generalized weakness but states patient is feeling better. Patient is voiding without difficulty. No open skin. Patient c/o heel soreness when in bed, floating heels relieved the soreness. Elzbieta Love RN on 12/1/2022 at 3:37 PM

## 2022-12-02 LAB
ANION GAP SERPL CALCULATED.3IONS-SCNC: 12 MMOL/L (ref 7–15)
BUN SERPL-MCNC: 19.1 MG/DL (ref 8–23)
CALCIUM SERPL-MCNC: 8.4 MG/DL (ref 8.8–10.2)
CHLORIDE SERPL-SCNC: 108 MMOL/L (ref 98–107)
CREAT SERPL-MCNC: 1.12 MG/DL (ref 0.67–1.17)
DEPRECATED HCO3 PLAS-SCNC: 20 MMOL/L (ref 22–29)
ERYTHROCYTE [DISTWIDTH] IN BLOOD BY AUTOMATED COUNT: 14.7 % (ref 10–15)
GFR SERPL CREATININE-BSD FRML MDRD: 65 ML/MIN/1.73M2
GLUCOSE BLDC GLUCOMTR-MCNC: 144 MG/DL (ref 70–99)
GLUCOSE BLDC GLUCOMTR-MCNC: 149 MG/DL (ref 70–99)
GLUCOSE BLDC GLUCOMTR-MCNC: 171 MG/DL (ref 70–99)
GLUCOSE BLDC GLUCOMTR-MCNC: 182 MG/DL (ref 70–99)
GLUCOSE BLDC GLUCOMTR-MCNC: 191 MG/DL (ref 70–99)
GLUCOSE SERPL-MCNC: 119 MG/DL (ref 70–99)
HCT VFR BLD AUTO: 37.6 % (ref 40–53)
HGB BLD-MCNC: 11.8 G/DL (ref 13.3–17.7)
HOLD SPECIMEN: NORMAL
HOLD SPECIMEN: NORMAL
LACTATE SERPL-SCNC: 0.7 MMOL/L (ref 0.7–2)
MAGNESIUM SERPL-MCNC: 2.2 MG/DL (ref 1.7–2.3)
MCH RBC QN AUTO: 27.8 PG (ref 26.5–33)
MCHC RBC AUTO-ENTMCNC: 31.4 G/DL (ref 31.5–36.5)
MCV RBC AUTO: 89 FL (ref 78–100)
PLATELET # BLD AUTO: 252 10E3/UL (ref 150–450)
POTASSIUM SERPL-SCNC: 4.7 MMOL/L (ref 3.4–5.3)
RBC # BLD AUTO: 4.25 10E6/UL (ref 4.4–5.9)
SODIUM SERPL-SCNC: 140 MMOL/L (ref 136–145)
WBC # BLD AUTO: 8.3 10E3/UL (ref 4–11)

## 2022-12-02 PROCEDURE — 83735 ASSAY OF MAGNESIUM: CPT | Performed by: INTERNAL MEDICINE

## 2022-12-02 PROCEDURE — 36415 COLL VENOUS BLD VENIPUNCTURE: CPT | Performed by: FAMILY MEDICINE

## 2022-12-02 PROCEDURE — 250N000011 HC RX IP 250 OP 636: Performed by: INTERNAL MEDICINE

## 2022-12-02 PROCEDURE — 36415 COLL VENOUS BLD VENIPUNCTURE: CPT | Performed by: INTERNAL MEDICINE

## 2022-12-02 PROCEDURE — 250N000013 HC RX MED GY IP 250 OP 250 PS 637: Performed by: INTERNAL MEDICINE

## 2022-12-02 PROCEDURE — 120N000001 HC R&B MED SURG/OB

## 2022-12-02 PROCEDURE — 99232 SBSQ HOSP IP/OBS MODERATE 35: CPT | Performed by: FAMILY MEDICINE

## 2022-12-02 PROCEDURE — 85014 HEMATOCRIT: CPT | Performed by: INTERNAL MEDICINE

## 2022-12-02 PROCEDURE — 80048 BASIC METABOLIC PNL TOTAL CA: CPT | Performed by: INTERNAL MEDICINE

## 2022-12-02 PROCEDURE — 83605 ASSAY OF LACTIC ACID: CPT | Performed by: FAMILY MEDICINE

## 2022-12-02 PROCEDURE — 258N000003 HC RX IP 258 OP 636: Performed by: FAMILY MEDICINE

## 2022-12-02 PROCEDURE — 250N000011 HC RX IP 250 OP 636: Performed by: FAMILY MEDICINE

## 2022-12-02 PROCEDURE — 87040 BLOOD CULTURE FOR BACTERIA: CPT | Performed by: INTERNAL MEDICINE

## 2022-12-02 PROCEDURE — 258N000003 HC RX IP 258 OP 636: Performed by: INTERNAL MEDICINE

## 2022-12-02 RX ORDER — ENOXAPARIN SODIUM 100 MG/ML
40 INJECTION SUBCUTANEOUS DAILY
Status: DISCONTINUED | OUTPATIENT
Start: 2022-12-02 | End: 2022-12-03 | Stop reason: HOSPADM

## 2022-12-02 RX ADMIN — ASPIRIN 81 MG: 81 TABLET, COATED ORAL at 21:05

## 2022-12-02 RX ADMIN — HYDROCHLOROTHIAZIDE 25 MG: 25 TABLET ORAL at 09:21

## 2022-12-02 RX ADMIN — OSELTAMIVIR PHOSPHATE 30 MG: 30 CAPSULE ORAL at 09:21

## 2022-12-02 RX ADMIN — METOPROLOL TARTRATE 25 MG: 25 TABLET, FILM COATED ORAL at 21:05

## 2022-12-02 RX ADMIN — PANTOPRAZOLE SODIUM 40 MG: 40 TABLET, DELAYED RELEASE ORAL at 09:20

## 2022-12-02 RX ADMIN — ACETAMINOPHEN 975 MG: 325 TABLET ORAL at 09:21

## 2022-12-02 RX ADMIN — CEFTRIAXONE SODIUM 2 G: 2 INJECTION, SOLUTION INTRAVENOUS at 21:11

## 2022-12-02 RX ADMIN — GABAPENTIN 900 MG: 300 CAPSULE ORAL at 21:04

## 2022-12-02 RX ADMIN — METOPROLOL TARTRATE 25 MG: 25 TABLET, FILM COATED ORAL at 09:22

## 2022-12-02 RX ADMIN — VANCOMYCIN HYDROCHLORIDE 1500 MG: 1 INJECTION, POWDER, LYOPHILIZED, FOR SOLUTION INTRAVENOUS at 08:26

## 2022-12-02 RX ADMIN — TAMSULOSIN HYDROCHLORIDE 0.4 MG: 0.4 CAPSULE ORAL at 09:22

## 2022-12-02 RX ADMIN — OSELTAMIVIR PHOSPHATE 30 MG: 30 CAPSULE ORAL at 21:05

## 2022-12-02 RX ADMIN — LISINOPRIL 40 MG: 40 TABLET ORAL at 09:21

## 2022-12-02 RX ADMIN — GABAPENTIN 900 MG: 300 CAPSULE ORAL at 09:20

## 2022-12-02 RX ADMIN — ENOXAPARIN SODIUM 40 MG: 40 INJECTION SUBCUTANEOUS at 13:30

## 2022-12-02 RX ADMIN — AMLODIPINE BESYLATE 10 MG: 10 TABLET ORAL at 09:20

## 2022-12-02 RX ADMIN — ATORVASTATIN CALCIUM 10 MG: 10 TABLET, FILM COATED ORAL at 21:05

## 2022-12-02 RX ADMIN — AZITHROMYCIN 500 MG: 500 INJECTION, POWDER, LYOPHILIZED, FOR SOLUTION INTRAVENOUS at 17:21

## 2022-12-02 ASSESSMENT — ACTIVITIES OF DAILY LIVING (ADL)
ADLS_ACUITY_SCORE: 35

## 2022-12-02 NOTE — PROGRESS NOTES
Pt compliant CPAP Therapy using home equipment with O2 @ 1lpm bled in. Pt on RA during the day. NPC. No acute resp distress observed.

## 2022-12-02 NOTE — PROGRESS NOTES
Patient was able to sleep overnight with home CPAP and 1L O2 bled in. Patient is hard of hearing but alert and oriented. Patient had no acute needs overnight. Nikita Urena RN on 12/2/2022 at 6:00 AM

## 2022-12-02 NOTE — PHARMACY-VANCOMYCIN DOSING SERVICE
Pharmacy Vancomycin Note  Date of Service 2022  Patient's  1940   82 year old, male    Indication: Sepsis  Day of Therapy: 2  Current vancomycin regimen:  1,250 mg IV q24h  Current vancomycin monitoring method: AUC  Current vancomycin therapeutic monitoring goal: 400-600 mg*h/L    InsightRX Prediction of Current Vancomycin Regimen  Loading dose: 2,000 mg x1 at 0823 on   Regimen: 1250 mg IV every 24 hours.  Start time: 08:43 on 2022  Exposure target: AUC24 (range)400-600 mg/L.hr   AUC24,ss: 399 mg/L.hr  Probability of AUC24 > 400: 50 %  Ctrough,ss: 12.1 mg/L  Probability of Ctrough,ss > 20: 12 %  Probability of nephrotoxicity (Lodise SELENA ): 7 %      Current estimated CrCl = Estimated Creatinine Clearance: 58.8 mL/min (based on SCr of 1.12 mg/dL).    Creatinine for last 3 days  2022:  3:15 AM Creatinine 1.65 mg/dL  2022:  4:50 AM Creatinine 1.32 mg/dL  2022:  6:01 AM Creatinine 1.12 mg/dL    Recent Vancomycin Levels (past 3 days)  No results found for requested labs within last 72 hours.    Vancomycin IV Administrations (past 72 hours)                   vancomycin (VANCOCIN) 2,000 mg in sodium chloride 0.9 % 500 mL intermittent infusion (mg) 2,000 mg New Bag 22 0823                Nephrotoxins and other renal medications (From now, onward)    Start     Dose/Rate Route Frequency Ordered Stop    22 0800  vancomycin (VANCOCIN) 1,500 mg in sodium chloride 0.9 % 500 mL intermittent infusion         1,500 mg  over 90 Minutes Intravenous EVERY 24 HOURS 22 0744      22 1115  lisinopril (ZESTRIL) tablet 40 mg         40 mg Oral DAILY 22 1110               Contrast Orders - past 72 hours (72h ago, onward)    None          Interpretation of current regimen:  Current regimen reflective of AUC less than 400    Has serum creatinine changed greater than 50% in last 72 hours: No    Urine output:  good urine output    Renal Function: Improving    InsightRX  Prediction of Planned New Vancomycin Regimen  Loading dose: 2,000 mg x1 on 12/1/22 at 0823  Regimen: 1500 mg IV every 24 hours.  Start time: 08:43 on 12/02/2022  Exposure target: AUC24 (range)400-600 mg/L.hr   AUC24,ss: 475 mg/L.hr  Probability of AUC24 > 400: 69 %  Ctrough,ss: 14.4 mg/L  Probability of Ctrough,ss > 20: 23 %  Probability of nephrotoxicity (Lodise SELENA 2009): 10 %      Plan:  1. Increase Dose to 1,500 mg Q24H  2. Vancomycin monitoring method: AUC  3. Vancomycin therapeutic monitoring goal: 400-600 mg*h/L  4. Pharmacy will check vancomycin levels as appropriate in 1-3 Days. Consider checking level tomorrow (12/3) if continuing therapy.  5. Serum creatinine levels will be ordered daily for the first week of therapy and at least twice weekly for subsequent weeks.    Taz Esquivel, [facility]

## 2022-12-02 NOTE — PROGRESS NOTES
[facility]    Medicine Progress Note - Hospitalist Service    Date of Admission:  11/30/2022    Assessment & Plan      Jasson Dougherty is a 82 year old male admitted on 11/30/2022. He presents with acute dyspnea and influenza A.    Principal Problem:    Acute respiratory failure with hypoxia (H), off oxygen today.   Assessment: present on admission, secondary to influenza. 87% on room air, requiring 3 liters of oxygen initially.  Plan: Continue supplemental oxygen. Tamiflu day 3    Bacteremia  Assessment: 2/2 blood culture positive for gram positive cocci. No Skin sores. Unclear source, still consider contaminant. history of colon cancer in 2016. Consider CT imaging if persistent positive cultures.   Plan: Empiric treatment with ceftriaxone and vancomycin. Repeat 2 sets of blood culture today and tomorrow. Echo to assess valves was neg.     Active Problems:    Rectal cancer (H)      Type 2 diabetes mellitus without complication (H)  Assessment: chronic  Plan: continue glargine and novolog      Sleep apnea  Assessment: chronic      HTN (hypertension)  Assessment: chronic  Plan: continue lisinopril, metoprolol      Influenza A  Assessment: present on admission  Plan: tamiflu day 3    # Hypomagnesemia: Lowest Mg = 1.2 mg/dL (Ref range: 1.7-2.3) in last 2 days, will replace as needed     # Hypoalbuminemia: Lowest albumin = 3.2 g/dL (Ref range: 3.5-5.2) at 11/30/2022  3:15 AM, will monitor as appropriate       # Hypertension: home medication list includes antihypertensive(s)       JOSE due to dehydration from acute illness. Improved with IVF. POA.        Diet: Combination Diet Regular Diet Adult    DVT Prophylaxis: Enoxaparin (Lovenox) SQ  English Catheter: Not present  Central Lines: None  Cardiac Monitoring: None  Code Status: Full Code      Disposition Plan           The patient's care was discussed with the Patient.    Alcira Leigh DO  Hospitalist Service  [facility]  "Hospital  Securely message with the Vocera Web Console (learn more here)  Text page via [employer] Paging/Directory         Clinically Significant Risk Factors              # Hypoalbuminemia: Lowest albumin = 3.2 g/dL (Ref range: 3.5-5.2) at 11/30/2022  3:15 AM, will monitor as appropriate          # Obesity: Estimated body mass index is 36.49 kg/m  as calculated from the following:    Height as of this encounter: 1.727 m (5' 8\").    Weight as of this encounter: 108.9 kg (240 lb)., PRESENT ON ADMISSION       _______________________________________________________________    Interval History   Patient doing very well this morning.  In fact, feels like he is mostly over the hump from his influenza.  No shortness of breath or chest pain.  Eating and drinking well.  Had positive blood culture which we are waiting on repeat before he will be able to discharge home.  No other new acute concerns today. normally ambulates on his own without an assistive device.     Data reviewed today: I reviewed all medications, new labs and imaging results over the last 24 hours. I personally reviewed no images or EKG's today.    Physical Exam   Vital Signs: Temp: 97.2  F (36.2  C) Temp src: Tympanic BP: (!) 145/44 Pulse: 72   Resp: 20 SpO2: 90 % O2 Device: BiPAP/CPAP Oxygen Delivery: 1 LPM  Weight: 240 lbs 0 oz  General Appearance: AAO. NAD. obese  Respiratory: diminished at base otherwise clear  Cardiovascular: RR  GI: abd soft, NT, ND  Skin: warm, dry  Other:      Data   Recent Labs   Lab 12/02/22  0708 12/02/22  0601 12/02/22  0212 12/01/22  0710 12/01/22  0450 11/30/22  1354 11/30/22  0315   WBC  --  8.3  --   --  11.1*  --  10.2   HGB  --  11.8*  --   --  12.5*  --  13.2*   MCV  --  89  --   --  91  --  89   PLT  --  252  --   --  226  --  231   INR  --   --   --   --   --   --  1.14   NA  --  140  --   --  137  --  136   POTASSIUM  --  4.7  --   --  4.6  --  4.8   CHLORIDE  --  108*  --   --  106  --  104   CO2  --  20*  --   --  19*  " --  18*   BUN  --  19.1  --   --  29.4*  --  41.9*   CR  --  1.12  --   --  1.32*  --  1.65*   ANIONGAP  --  12  --   --  12  --  14   YOVANA  --  8.4*  --   --  8.5*  --  8.8   * 119* 144*   < > 117*   < > 85   ALBUMIN  --   --   --   --   --   --  3.2*   PROTTOTAL  --   --   --   --   --   --  6.4   BILITOTAL  --   --   --   --   --   --  0.3   ALKPHOS  --   --   --   --   --   --  76   ALT  --   --   --   --   --   --  20   AST  --   --   --   --   --   --  14    < > = values in this interval not displayed.     Recent Results (from the past 24 hour(s))   Echocardiogram Complete   Result Value    LVEF  55-60%    [facility]    [id number]  [id number]  [id number]  [id number]^ARTHUR^JEFFREY^L     [facility]  [address] [place] [address]     Name: MARILY PENNY  MRN: [id number]  : 1940  Study Date: 2022 11:12 AM  Age: 82 yrs  Gender: Male  Patient Location: [facility]  Reason For Study: Endocarditis  Ordering Physician: JEFFREY GIBSON  Performed By: Lorri Anna     BSA: 2.2 m2  Height: 68 in  Weight: 240 lb  HR: 62  BP: 132/70 mmHg  ______________________________________________________________________________  Procedure  Echocardiogram with two-dimensional, color and spectral Doppler performed.  ______________________________________________________________________________  Interpretation Summary  No vegetation or mass identified, however this does not exclude endocarditis.  Left ventricular size, wall motion and function are normal. The ejection  fraction is 55-60%.     Right ventricular function, chamber size, wall motion, and thickness are  normal.     Left ventricular diastolic function is indeterminate howevere, diastolic  Doppler findings (E/E' ratio and/or other parameters) suggest left ventricular  filling pressures are increased.     No vegetation or mass identified, however this does not exclude endocarditis.  This was a technically limited study to rule out  endocarditis.     The inferior vena cava is normal.     Trivial pericardial effusion is present.     There is no prior study for direct comparison.  ______________________________________________________________________________  Left Ventricle  Left ventricular size, wall motion and function are normal. The ejection  fraction is 55-60%. Diastolic Doppler findings (E/E' ratio and/or other  parameters) suggest left ventricular filling pressures are increased. Left  ventricular diastolic function is indeterminate.     Right Ventricle  Right ventricular function, chamber size, wall motion, and thickness are  normal.     Atria  The left atrium appears normal. The atrial septum is intact as assessed by  color Doppler .     Mitral Valve  Mild mitral annular calcification is present. Trace mitral insufficiency is  present.     Aortic Valve  Aortic valve sclerosis is present. The calculated aortic valve are is 2.0  cm^2. The mean AoV pressure gradient is 5.0 mmHg.     Tricuspid Valve  The tricuspid valve is normal. Trace tricuspid insufficiency is present. The  right ventricular systolic pressure is approximated at 28.9 mmHg plus the  right atrial pressure.     Pulmonic Valve  The pulmonic valve is normal.     Vessels  The inferior vena cava is normal. IVC diameter <2.1 cm collapsing >50% with  sniff suggests a normal RA pressure of 3 mmHg.     Pericardium  Trivial pericardial effusion is present.     Compared to Previous Study  There is no prior study for direct comparison.     ______________________________________________________________________________  MMode/2D Measurements & Calculations  IVSd: 1.00 cm  LVIDd: 5.6 cm  LVIDs: 3.6 cm  LVPWd: 1.1 cm  FS: 36.4 %     LV mass(C)d: 228.4 grams  LV mass(C)dI: 103.4 grams/m2  Ao root diam: 2.3 cm  asc Aorta Diam: 3.3 cm  LVOT diam: 1.9 cm  LVOT area: 2.8 cm2  LA Volume (BP): 61.0 ml  LA Volume Index (BP): 27.6 ml/m2  RWT: 0.37     Doppler Measurements & Calculations  MV E max  oren: 109.0 cm/sec  MV A max oren: 128.0 cm/sec  MV E/A: 0.85     MV dec slope: 392.0 cm/sec2  MV dec time: 0.28 sec  Ao V2 max: 150.0 cm/sec  Ao max P.0 mmHg  Ao V2 mean: 103.0 cm/sec  Ao mean P.0 mmHg  Ao V2 VTI: 37.4 cm  PHIL(I,D): 2.0 cm2  PHIL(V,D): 2.0 cm2  LV V1 max P.3 mmHg  LV V1 max: 104.0 cm/sec  LV V1 VTI: 26.3 cm  SV(LVOT): 74.6 ml  SI(LVOT): 33.8 ml/m2  PA acc time: 0.13 sec  TR max oren: 269.0 cm/sec  TR max P.9 mmHg  AV Oren Ratio (DI): 0.69  PHIL Index (cm2/m2): 0.90  E/E' av.5  Lateral E/e': 26.4  Medial E/e': 18.6     ______________________________________________________________________________  Report approved by: Bibi Luna 2022 01:37 PM

## 2022-12-02 NOTE — PLAN OF CARE
Problem: Gas Exchange Impaired  Goal: Optimal Gas Exchange  Outcome: Progressing  Intervention: Optimize Oxygenation and Ventilation  Flowsheets  Taken 12/2/2022 0359  Airway/Ventilation Management: airway patency maintained  Head of Bed (HOB) Positioning: HOB at 20-30 degrees  Taken 12/1/2022 1954  Head of Bed (HOB) Positioning: HOB at 30-45 degrees     Problem: Diabetes Comorbidity  Goal: Blood Glucose Level Within Targeted Range  Outcome: Progressing     Problem: Obstructive Sleep Apnea Risk or Actual Comorbidity Management  Goal: Unobstructed Breathing During Sleep  Outcome: Progressing  Intervention: Monitor and Manage Obstructive Sleep Apnea  Flowsheets  Taken 12/2/2022 0359  NPPV/CPAP Maintenance: home PAP equipment/settings used  Medication Review/Management: medications reviewed  Taken 12/1/2022 1954  Medication Review/Management: medications reviewed   Goal Outcome Evaluation:

## 2022-12-03 VITALS
TEMPERATURE: 97.9 F | HEART RATE: 59 BPM | RESPIRATION RATE: 20 BRPM | DIASTOLIC BLOOD PRESSURE: 58 MMHG | SYSTOLIC BLOOD PRESSURE: 179 MMHG | HEIGHT: 68 IN | WEIGHT: 244.2 LBS | BODY MASS INDEX: 37.01 KG/M2 | OXYGEN SATURATION: 93 %

## 2022-12-03 LAB
GLUCOSE BLDC GLUCOMTR-MCNC: 116 MG/DL (ref 70–99)
GLUCOSE BLDC GLUCOMTR-MCNC: 142 MG/DL (ref 70–99)
LACTATE SERPL-SCNC: 0.7 MMOL/L (ref 0.7–2)

## 2022-12-03 PROCEDURE — 36415 COLL VENOUS BLD VENIPUNCTURE: CPT | Performed by: FAMILY MEDICINE

## 2022-12-03 PROCEDURE — 999N000157 HC STATISTIC RCP TIME EA 10 MIN

## 2022-12-03 PROCEDURE — 250N000011 HC RX IP 250 OP 636: Performed by: FAMILY MEDICINE

## 2022-12-03 PROCEDURE — 99239 HOSP IP/OBS DSCHRG MGMT >30: CPT | Performed by: FAMILY MEDICINE

## 2022-12-03 PROCEDURE — 83605 ASSAY OF LACTIC ACID: CPT | Performed by: FAMILY MEDICINE

## 2022-12-03 PROCEDURE — 258N000003 HC RX IP 258 OP 636: Performed by: FAMILY MEDICINE

## 2022-12-03 PROCEDURE — 250N000013 HC RX MED GY IP 250 OP 250 PS 637: Performed by: INTERNAL MEDICINE

## 2022-12-03 RX ORDER — AZITHROMYCIN 250 MG/1
250 TABLET, FILM COATED ORAL DAILY
Qty: 3 TABLET | Refills: 0 | Status: SHIPPED | OUTPATIENT
Start: 2022-12-03 | End: 2022-12-06

## 2022-12-03 RX ORDER — OSELTAMIVIR PHOSPHATE 30 MG/1
30 CAPSULE ORAL DAILY
Qty: 3 CAPSULE | Refills: 0 | Status: SHIPPED | OUTPATIENT
Start: 2022-12-03 | End: 2022-12-03

## 2022-12-03 RX ORDER — OSELTAMIVIR PHOSPHATE 75 MG/1
75 CAPSULE ORAL 2 TIMES DAILY
Qty: 4 CAPSULE | Refills: 0 | Status: SHIPPED | OUTPATIENT
Start: 2022-12-03 | End: 2022-12-05

## 2022-12-03 RX ADMIN — ACETAMINOPHEN 975 MG: 325 TABLET ORAL at 02:07

## 2022-12-03 RX ADMIN — VANCOMYCIN HYDROCHLORIDE 1500 MG: 1 INJECTION, POWDER, LYOPHILIZED, FOR SOLUTION INTRAVENOUS at 08:25

## 2022-12-03 ASSESSMENT — ACTIVITIES OF DAILY LIVING (ADL)
ADLS_ACUITY_SCORE: 35
ADLS_ACUITY_SCORE: 35
ADLS_ACUITY_SCORE: 32
ADLS_ACUITY_SCORE: 35

## 2022-12-03 NOTE — PLAN OF CARE
Goal Outcome Evaluation:  Pt A&O. BP remains elevated. Afebrile. LS dim. C/O chronic pain to Left arm. PRN tylenol given with relief. Pt uses home CPAP at night with 1 LPM O2 bled in. Room air during the day. AROM moderately impaired in LUE.  Denies SOB or nausea. Blood sugars elevated, insulin given per order. Urine output adequate. SBA.         Plan of Care Reviewed With: patient    Overall Patient Progress: improving    Outcome Evaluation: Pt O2 stable on RA during the day. CPAP use at night with 1 LPM O2 bled in. No c/o dyspnea on exertion.      Problem: Plan of Care - These are the overarching goals to be used throughout the patient stay.    Goal: Plan of Care Review  Description:   Outcome: Progressing  Flowsheets (Taken 12/3/2022 0402)  Outcome Evaluation: Pt O2 stable on RA during the day. CPAP use at night with 1 LPM O2 bled in. No c/o dyspnea on exertion.  Plan of Care Reviewed With: patient  Overall Patient Progress: improving  Goal: Patient-Specific Goal (Individualized)  Description:  Outcome: Progressing  Flowsheets (Taken 12/3/2022 0404)  Individualized Care Needs: chronic pain management  Goal: Absence of Hospital-Acquired Illness or Injury  Outcome: Progressing  Intervention: Identify and Manage Fall Risk  Recent Flowsheet Documentation  Taken 12/3/2022 0356 by Barby Leyva, RN  Safety Promotion/Fall Prevention: safety round/check completed  Taken 12/2/2022 1951 by Barby Leyva, RN  Safety Promotion/Fall Prevention:   lighting adjusted   mobility aid in reach   clutter free environment maintained   fall prevention program maintained   nonskid shoes/slippers when out of bed   room near nurse's station   room organization consistent   room door open   safety round/check completed   supervised activity  Intervention: Prevent and Manage VTE (Venous Thromboembolism) Risk  Recent Flowsheet Documentation  Taken 12/3/2022 0356 by Barby Leyva, RN  VTE Prevention/Management: SCDs (sequential compression  devices) off  Taken 12/2/2022 1951 by Barby Leyva, RN  VTE Prevention/Management: (Up in chair) SCDs (sequential compression devices) off  Goal: Optimal Comfort and Wellbeing  Outcome: Progressing  Goal: Readiness for Transition of Care  Outcome: Progressing  Flowsheets (Taken 12/3/2022 0404)  Anticipated Changes Related to Illness: none  Transportation Anticipated: family or friend will provide  Concerns to be Addressed: discharge planning  Intervention: Mutually Develop Transition Plan  Recent Flowsheet Documentation  Taken 12/3/2022 0404 by Barby Leyva RN  Anticipated Changes Related to Illness: none  Transportation Anticipated: family or friend will provide  Concerns to be Addressed: discharge planning     Problem: Gas Exchange Impaired  Goal: Optimal Gas Exchange  Outcome: Progressing     Problem: Diabetes Comorbidity  Goal: Blood Glucose Level Within Targeted Range  Outcome: Progressing     Problem: Obstructive Sleep Apnea Risk or Actual Comorbidity Management  Goal: Unobstructed Breathing During Sleep  Outcome: Progressing  Intervention: Monitor and Manage Obstructive Sleep Apnea  Recent Flowsheet Documentation  Taken 12/3/2022 0356 by Barby Leyva, RN  NPPV/CPAP Maintenance: home PAP equipment/settings used  Medication Review/Management: medications reviewed  Taken 12/2/2022 1951 by Barby Leyva, RN  Medication Review/Management: medications reviewed

## 2022-12-03 NOTE — PHARMACY - DISCHARGE MEDICATION RECONCILIATION
[facility]  Part of [address] [address]    December 3, 2022    Dear Pharmacist,    Your customer, Jasson Dougherty, born on 1940, was recently discharged from [facility].  We have updated his medication list and want to alert you to the following:       Review of your medicines      START taking      Dose / Directions   azithromycin 250 MG tablet  Commonly known as: ZITHROMAX      Dose: 250 mg  Take 1 tablet (250 mg) by mouth daily for 3 days  Quantity: 3 tablet  Refills: 0     oseltamivir 75 MG capsule  Commonly known as: TAMIFLU      Dose: 75 mg  Take 1 capsule (75 mg) by mouth 2 times daily for 4 doses  Quantity: 4 capsule  Refills: 0        CONTINUE these medicines which have NOT CHANGED      Dose / Directions   acetaminophen 500 MG tablet  Commonly known as: TYLENOL      Dose: 1,000 mg  Take 1,000 mg by mouth 2 times daily  Refills: 0     amLODIPine 10 MG tablet  Commonly known as: NORVASC      Dose: 10 mg  Take 10 mg by mouth daily  Refills: 0     aspirin 81 MG EC tablet      Dose: 81 mg  Take 81 mg by mouth daily with food  Refills: 0     blood glucose test strip  Commonly known as: NO BRAND SPECIFIED      check sugars once daily before meals  Refills: 0     cyanocobalamin 1000 MCG/ML injection  Commonly known as: CYANOCOBALAMIN      Dose: 1 mL  Inject 1 mL into the muscle every 30 days  Refills: 0     gabapentin 300 MG capsule  Commonly known as: NEURONTIN      Dose: 900 mg  Take 900 mg by mouth 2 times daily  Refills: 0     hydrochlorothiazide 25 MG tablet  Commonly known as: HYDRODIURIL      Dose: 25 mg  Take 25 mg by mouth daily  Refills: 0     insulin aspart 100 UNIT/ML pen  Commonly known as: NovoLOG PEN      Dose: 20 Units  Inject 20 Units Subcutaneous 3 times daily (with meals) Give within 15 minutes before or after meals/snacks.  Refills: 0     insulin glargine 100 UNIT/ML pen  Commonly known as: LANTUS PEN       Dose: 40 Units  Inject 40 Units Subcutaneous 2 times daily  Refills: 0     insulin pen needle 32G X 8 MM      Dose: 120 each  120 each  Refills: 0     lisinopril 40 MG tablet  Commonly known as: ZESTRIL      Dose: 40 mg  Take 40 mg by mouth daily  Refills: 0     metoprolol tartrate 50 MG tablet  Commonly known as: LOPRESSOR      Dose: 25 mg  Take 25 mg by mouth 2 times daily  Refills: 0     nitroGLYcerin 0.4 MG sublingual tablet  Commonly known as: NITROSTAT      Dose: 0.4 mg  Place 0.4 mg under the tongue every 5 minutes as needed for chest pain For chest pain place 1 tablet under the tongue every 5 minutes for 3 doses. If symptoms persist 5 minutes after 1st dose call 911.  Refills: 0     omeprazole 20 MG tablet      Dose: 20 mg  Take 20 mg by mouth daily  Refills: 0     * OneTouch Basic System w/Device Kit      Refills: 0     * Fifty50 Glucose Meter 2.0 w/Device Kit      Refills: 0     * OneTouch Lancets Misc      Refills: 0     * lancets 28G Misc      Refills: 0     pravastatin 40 MG tablet  Commonly known as: PRAVACHOL      Dose: 40 mg  Take 40 mg by mouth daily  Refills: 0     tamsulosin 0.4 MG capsule  Commonly known as: FLOMAX      Dose: 0.4 mg  Take 0.4 mg by mouth daily  Refills: 0     Vitamin D (Cholecalciferol) 25 MCG (1000 UT) Tabs      Dose: 4,000 Units  Take 4,000 Units by mouth daily  Refills: 0         * This list has 4 medication(s) that are the same as other medications prescribed for you. Read the directions carefully, and ask your doctor or other care provider to review them with you.               Where to get your medicines      These medications were sent to [id number] - [place], [place] - [address]  [address], [facility] [address]    Phone: [phone]     azithromycin 250 MG tablet    oseltamivir 75 MG capsule         We also reviewed Jasson Dougherty's allergy list and updated it as needed:  Allergies: Penicillins    Thank you for continuing to  care for Jasson Dougherty.  We look forward to working together with you in the future.    Sincerely,  Della Perez, [facility]

## 2022-12-03 NOTE — PROGRESS NOTES
Pt placed on home CPAP/BIPAP mask by nursing for Nocturnal use. Compliant with CPAP Therapy using home equipment with O2 @ 1lpm bled in. RA during the day, maintaining SpO2 > 90%. No acute resp distress observed.

## 2022-12-03 NOTE — PROGRESS NOTES
"On room air unless sleeping at night or napping during the day. CPAP at 1 liter,    Patient stated his left rotator cuff is torn and his doctor doctor said \"he is too old for surgery\".    Possible discharge Saturday if cultures are available and clear.  "

## 2022-12-03 NOTE — PROGRESS NOTES
Gave discharge instructions to patient and his son.  Answered questions.  Wheelchair ride to son's vehicle at 1200.    Patient wanted to take his own morning meds at home.

## 2022-12-03 NOTE — DISCHARGE SUMMARY
[facility]  Hospitalist Discharge Summary      Date of Admission:  11/30/2022  Date of Discharge:  12/3/2022  Discharging Provider: Alcira Leigh DO  Discharge Service: Hospitalist Service    Discharge Diagnoses   Acute respiratory failure with hypoxia.  Due to influenza.  Bacteremia, present on admission.  Contamination.  Ruled out.  Acute kidney injury, present on admission.  Wednesday, present on admission.    Follow-ups Needed After Discharge   Follow-up Appointments     Follow-up and recommended labs and tests       Follow up with primary care provider, Lakesha Murphy, within 7 days for   hospital follow- up.  No follow up labs or test are needed.             Unresulted Labs Ordered in the Past 30 Days of this Admission     Date and Time Order Name Status Description    12/1/2022 11:00 PM Blood Culture Arm, Left Preliminary     12/1/2022 11:00 PM Blood Culture Arm, Left Preliminary     12/1/2022 10:12 AM Blood Culture Peripheral Blood Preliminary     12/1/2022 10:12 AM Blood Culture Peripheral Blood Preliminary     11/30/2022  2:53 AM Blood Culture Arm, Left Preliminary       These results will be followed up by PCP    Discharge Disposition   Discharged to home  Condition at discharge: Stable    Hospital Course      Jasson Dougherty is a 82 year old male admitted on 11/30/2022. He presents with acute dyspnea and influenza A.    Principal Problem:    Acute respiratory failure with hypoxia (H), off oxygen today.  Due to infection with influenza.  Cannot exclude community-acquired pneumonia on top of the influenza.  He was treated with ceftriaxone and vancomycin for concern related to bacteremia.  He improved with treatment with Tamiflu.  He was able to be weaned off oxygen during the day.  He normally bleeds 1 L of oxygen into his CPAP at night which is chronic for him.  Complete course of treatment with antibiotics and Tamiflu at time of discharge.    Bacteremia  Assessment: 2/2 blood culture  positive for gram positive cocci.  These were drawn off the same place.  Repeat was negative.  Presumably contamination.  Echocardiogram was negative for valvular abnormality.  Initially treated with Rocephin and vancomycin, transition to oral antibiotics at discharge.        Rectal cancer (H)      Type 2 diabetes mellitus without complication (H)  Assessment: chronic  Plan: continue glargine and novolog      Sleep apnea  Assessment: chronic. Home CPAP.      HTN (hypertension)  Assessment: chronic  Plan: continue lisinopril, metoprolol      Influenza A  Assessment: present on admission. Treated with tamiflu    # Hypomagnesemia: Lowest Mg = 1.2 mg/dL (Ref range: 1.7-2.3) in last 2 days, will replace as needed     # Hypoalbuminemia: Lowest albumin = 3.2 g/dL (Ref range: 3.5-5.2) at 11/30/2022  3:15 AM, will monitor as appropriate       # Hypertension: home medication list includes antihypertensive(s)       JOSE due to dehydration from acute illness. Improved with IVF. POA.       Consultations This Hospital Stay   RESPIRATORY CARE IP CONSULT  PHARMACY TO DOSE VANCO    Code Status   Full Code    Time Spent on this Encounter   IAlcira DO, personally saw the patient today and spent greater than 30 minutes discharging this patient.       Alcira Leigh DO  [facility]  [address]  [place] [address]  Phone: [phone]  Fax: [phone]  ______________________________________________________________________    Physical Exam   Vital Signs: Temp: 97.9  F (36.6  C) Temp src: Oral BP: (!) 179/58 Pulse: 59   Resp: 20 SpO2: 93 % O2 Device: BiPAP/CPAP Oxygen Delivery: 1 LPM  Weight: 244 lbs 3.2 oz  General Appearance: Awake, alert and oriented.  No acute distress.  Obese.  Respiratory: Clear to auscultation bilaterally.  No wheezing rhonchi or rales.  Cardiovascular: Regular rate.  GI: Abdomen soft, nontender, nondistended  Skin: Warm and dry  Other:         Primary Care  Physician   Lakesha Murphy    Discharge Orders      Reason for your hospital stay    Influenza and pneumonia     Follow-up and recommended labs and tests     Follow up with primary care provider, Lakesha Murphy, within 7 days for hospital follow- up.  No follow up labs or test are needed.     Activity    Your activity upon discharge: activity as tolerated     Diet    Follow this diet upon discharge: Orders Placed This Encounter      Combination Diet Regular Diet Adult       Significant Results and Procedures   Most Recent 3 CBC's:  Recent Labs   Lab Test 12/02/22  0601 12/01/22  0450 11/30/22  0315   WBC 8.3 11.1* 10.2   HGB 11.8* 12.5* 13.2*   MCV 89 91 89    226 231     Most Recent 3 BMP's:  Recent Labs   Lab Test 12/03/22  0803 12/03/22  0149 12/02/22 2049 12/02/22  0708 12/02/22  0601 12/01/22  0710 12/01/22  0450 11/30/22  1354 11/30/22  0315   NA  --   --   --   --  140  --  137  --  136   POTASSIUM  --   --   --   --  4.7  --  4.6  --  4.8   CHLORIDE  --   --   --   --  108*  --  106  --  104   CO2  --   --   --   --  20*  --  19*  --  18*   BUN  --   --   --   --  19.1  --  29.4*  --  41.9*   CR  --   --   --   --  1.12  --  1.32*  --  1.65*   ANIONGAP  --   --   --   --  12  --  12  --  14   YOVANA  --   --   --   --  8.4*  --  8.5*  --  8.8   * 142* 182*   < > 119*   < > 117*   < > 85    < > = values in this interval not displayed.     7-Day Micro Results     Collected Updated Procedure Result Status      12/02/2022 0601 12/03/2022 0616 Blood Culture Arm, Left [[id number]]   Blood from Arm, Left    Preliminary result Component Value   Culture No growth after 1 day  [P]                12/02/2022 0601 12/03/2022 0616 Blood Culture Arm, Left [[id number]]   Blood from Arm, Left    Preliminary result Component Value   Culture No growth after 1 day  [P]                12/01/2022 1023 12/03/2022 1046 Blood Culture Peripheral Blood [[id number]]   Peripheral Blood    Preliminary result Component Value    Culture No growth after 2 days  [P]                12/01/2022 1023 12/03/2022 1046 Blood Culture Peripheral Blood [[id number]]   Peripheral Blood    Preliminary result Component Value   Culture No growth after 2 days  [P]                11/30/2022 0358 12/01/2022 1249 Urine Culture Aerobic Bacterial [[id number]]   Urine, Midstream    Final result Component Value   Culture Mixed Urogenital Selena               11/30/2022 0315 12/01/2022 0245 Blood Culture Arm, Left [[id number]]    (Abnormal)   Blood from Arm, Left    Preliminary result Component Value   Culture Gram positive cocci  [P]                11/30/2022 0255 11/30/2022 0338 Asymptomatic Influenza A/B & SARS-CoV2 (COVID-19) Virus PCR Multiplex Nose [[id number]]    (Abnormal)   Swab from Nose    Final result Component Value   Influenza A PCR Positive   Influenza B PCR Negative   RSV PCR Negative   SARS CoV2 PCR Negative   NEGATIVE: SARS-CoV-2 (COVID-19) RNA not detected, presumed negative.              ,   Results for orders placed or performed during the hospital encounter of 11/30/22   XR Chest Port 1 View    Narrative    PROCEDURE INFORMATION:   Exam: XR Chest   Exam date and time: 11/30/2022 3:33 AM   Age: 82 years old   Clinical indication: Other: Fever     TECHNIQUE:   Imaging protocol: Radiologic exam of the chest.   Views: 1 view.     COMPARISON:   CR XR CHEST PORT 1 VIEW 5/16/2022 7:00 PM     FINDINGS:   Lungs:  There is central bronchovascular crowding likely due to portable   technique. No consolidation.   Pleural spaces: Unremarkable. No pleural effusion. No pneumothorax.   Heart/Mediastinum: Unremarkable. No cardiomegaly.   Vasculature: There is aortic knob calcifications   Bones/joints: Unremarkable.       Impression    IMPRESSION:   No focal lung consolidation.    THIS DOCUMENT HAS BEEN ELECTRONICALLY SIGNED BY MARISSA MARTINEZ MD   Echocardiogram Complete     Value    LVEF  55-60%    Narrative     [id number]  [id number]  [id number]  [id number]^ARTHUR^JEFFREY^L     [facility]  [address] [place] [address]     Name: MARILY PENNY  MRN: [id number]  : 1940  Study Date: 2022 11:12 AM  Age: 82 yrs  Gender: Male  Patient Location: [facility]  Reason For Study: Endocarditis  Ordering Physician: JEFFREY GIBSON  Performed By: Lorri Anna     BSA: 2.2 m2  Height: 68 in  Weight: 240 lb  HR: 62  BP: 132/70 mmHg  ______________________________________________________________________________  Procedure  Echocardiogram with two-dimensional, color and spectral Doppler performed.  ______________________________________________________________________________  Interpretation Summary  No vegetation or mass identified, however this does not exclude endocarditis.  Left ventricular size, wall motion and function are normal. The ejection  fraction is 55-60%.     Right ventricular function, chamber size, wall motion, and thickness are  normal.     Left ventricular diastolic function is indeterminate howevere, diastolic  Doppler findings (E/E' ratio and/or other parameters) suggest left ventricular  filling pressures are increased.     No vegetation or mass identified, however this does not exclude endocarditis.  This was a technically limited study to rule out endocarditis.     The inferior vena cava is normal.     Trivial pericardial effusion is present.     There is no prior study for direct comparison.  ______________________________________________________________________________  Left Ventricle  Left ventricular size, wall motion and function are normal. The ejection  fraction is 55-60%. Diastolic Doppler findings (E/E' ratio and/or other  parameters) suggest left ventricular filling pressures are increased. Left  ventricular diastolic function is indeterminate.     Right Ventricle  Right ventricular function, chamber size, wall motion, and thickness are  normal.     Atria  The left  atrium appears normal. The atrial septum is intact as assessed by  color Doppler .     Mitral Valve  Mild mitral annular calcification is present. Trace mitral insufficiency is  present.     Aortic Valve  Aortic valve sclerosis is present. The calculated aortic valve are is 2.0  cm^2. The mean AoV pressure gradient is 5.0 mmHg.     Tricuspid Valve  The tricuspid valve is normal. Trace tricuspid insufficiency is present. The  right ventricular systolic pressure is approximated at 28.9 mmHg plus the  right atrial pressure.     Pulmonic Valve  The pulmonic valve is normal.     Vessels  The inferior vena cava is normal. IVC diameter <2.1 cm collapsing >50% with  sniff suggests a normal RA pressure of 3 mmHg.     Pericardium  Trivial pericardial effusion is present.     Compared to Previous Study  There is no prior study for direct comparison.     ______________________________________________________________________________  MMode/2D Measurements & Calculations  IVSd: 1.00 cm  LVIDd: 5.6 cm  LVIDs: 3.6 cm  LVPWd: 1.1 cm  FS: 36.4 %     LV mass(C)d: 228.4 grams  LV mass(C)dI: 103.4 grams/m2  Ao root diam: 2.3 cm  asc Aorta Diam: 3.3 cm  LVOT diam: 1.9 cm  LVOT area: 2.8 cm2  LA Volume (BP): 61.0 ml  LA Volume Index (BP): 27.6 ml/m2  RWT: 0.37     Doppler Measurements & Calculations  MV E max oren: 109.0 cm/sec  MV A max oren: 128.0 cm/sec  MV E/A: 0.85     MV dec slope: 392.0 cm/sec2  MV dec time: 0.28 sec  Ao V2 max: 150.0 cm/sec  Ao max P.0 mmHg  Ao V2 mean: 103.0 cm/sec  Ao mean P.0 mmHg  Ao V2 VTI: 37.4 cm  PHIL(I,D): 2.0 cm2  PHIL(V,D): 2.0 cm2  LV V1 max P.3 mmHg  LV V1 max: 104.0 cm/sec  LV V1 VTI: 26.3 cm  SV(LVOT): 74.6 ml  SI(LVOT): 33.8 ml/m2  PA acc time: 0.13 sec  TR max oren: 269.0 cm/sec  TR max P.9 mmHg  AV Oren Ratio (DI): 0.69  PHIL Index (cm2/m2): 0.90  E/E' av.5  Lateral E/e': 26.4  Medial E/e': 18.6      ______________________________________________________________________________  Report approved by: Bibi Luna 12/01/2022 01:37 PM               Discharge Medications   Current Discharge Medication List      START taking these medications    Details   azithromycin (ZITHROMAX) 250 MG tablet Take 1 tablet (250 mg) by mouth daily for 3 days  Qty: 3 tablet, Refills: 0    Associated Diagnoses: Acute respiratory failure with hypoxia (H)      oseltamivir (TAMIFLU) 75 MG capsule Take 1 capsule (75 mg) by mouth 2 times daily for 4 doses  Qty: 4 capsule, Refills: 0    Associated Diagnoses: Acute respiratory failure with hypoxia (H)         CONTINUE these medications which have NOT CHANGED    Details   acetaminophen (TYLENOL) 500 MG tablet Take 1,000 mg by mouth 2 times daily      amLODIPine (NORVASC) 10 MG tablet Take 10 mg by mouth daily      cyanocobalamin (CYANOCOBALAMIN) 1000 MCG/ML injection Inject 1 mL into the muscle every 30 days      gabapentin (NEURONTIN) 300 MG capsule Take 900 mg by mouth 2 times daily      hydrochlorothiazide (HYDRODIURIL) 25 MG tablet Take 25 mg by mouth daily      insulin aspart (NOVOLOG PEN) 100 UNIT/ML injection Inject 20 Units Subcutaneous 3 times daily (with meals) Give within 15 minutes before or after meals/snacks.      insulin glargine (LANTUS PEN) 100 UNIT/ML pen Inject 40 Units Subcutaneous 2 times daily      lisinopril (ZESTRIL) 40 MG tablet Take 40 mg by mouth daily      metoprolol tartrate (LOPRESSOR) 50 MG tablet Take 25 mg by mouth 2 times daily      nitroGLYcerin (NITROSTAT) 0.4 MG sublingual tablet Place 0.4 mg under the tongue every 5 minutes as needed for chest pain For chest pain place 1 tablet under the tongue every 5 minutes for 3 doses. If symptoms persist 5 minutes after 1st dose call 911.      omeprazole 20 MG tablet Take 20 mg by mouth daily      tamsulosin (FLOMAX) 0.4 MG capsule Take 0.4 mg by mouth daily      Vitamin D, Cholecalciferol, 25 MCG (1000 UT)  TABS Take 4,000 Units by mouth daily      aspirin EC 81 MG EC tablet Take 81 mg by mouth daily with food      blood glucose (NO BRAND SPECIFIED) test strip check sugars once daily before meals      !! Blood Glucose Monitoring Suppl (FIFTY50 GLUCOSE METER 2.0) W/DEVICE KIT       !! Blood Glucose Monitoring Suppl (ONE TOUCH BASIC SYSTEM) W/DEVICE KIT       insulin pen needle 32G X 8  each      !! lancets 28G MISC       !! ONE TOUCH LANCETS MISC       pravastatin (PRAVACHOL) 40 MG tablet Take 40 mg by mouth daily       !! - Potential duplicate medications found. Please discuss with provider.        Allergies   Allergies   Allergen Reactions     Penicillins Rash and Unknown

## 2022-12-03 NOTE — PHARMACY - DISCHARGE MEDICATION RECONCILIATION AND EDUCATION
Pharmacy: Discharge Counseling and Medication Reconciliation    Jasson Dougherty  [address]  [facility] [address]  [phone] (home)   82 year old male  PCP:Lakesha Murphy    Allergies   Allergen Reactions     Penicillins Rash and Unknown       Discharge Counseling:    Pharmacist met with patient (and/or family) today to review the medication portion of the After Visit Summary (with an emphasis on NEW medications) and to address patient's questions/concerns.     Summary of Education:   Met with patient at time of discharge to review all changes in medications including new Tamiflu and azithromycin. Discussed indications, directions for use, and possible side effects. Patient asked a few questions and all concerns were addressed.     Materials Provided:   MedCounselor sheets printed from Clinical Pharmacology on: Tamiflu and azithromycin    Discharge Medication Reconciliation:    Della Perez RPH has reviewed the patient's discharge medication orders and has compared them to the inpatient medication administration record and to what the patient was taking prior to admission- any discrepancies have been resolved.     It has been determined that the patient has an adequate supply of medications available or which can be obtained from the patient's preferred pharmacy, which has been confirmed as: [facility]. [An updated medication list will be faxed to the patient's pharmacy.]     Thank you for the consult.     Della Perez RPH ....................  12/3/2022   10:04 AM

## 2022-12-05 ENCOUNTER — PATIENT OUTREACH (OUTPATIENT)
Dept: FAMILY MEDICINE | Facility: OTHER | Age: 82
End: 2022-12-05

## 2022-12-05 NOTE — TELEPHONE ENCOUNTER
Transitional Care Management Phone Call and 30 day med reconciliation    Summary of hospitalization:  [facility] discharge summary reviewed  HOSPITAL DISCHARGE DIAGNOSIS:   Acute respiratory failure with hypoxia.  Due to influenza.  Bacteremia, present on admission.  Contamination.  Ruled out.  Acute kidney injury, present on admission.  Wednesday, present on admission.    Diagnostic Tests/Treatments reviewed.  Follow up needed:   Follow-up Appointments     Follow-up and recommended labs and tests       Follow up with primary care provider, Lakesha Murphy, within 7 days for   hospital follow- up.  No follow up labs or test are needed.     Post Discharge Medication Reconciliation: discharge medications reconciled, continue medications without change.  Medications reviewed by: by myself    Problems taking medications regularly:  None  Problems adhering to non-medication therapy:  None    Other Healthcare Providers Involved in Patient s Care:         None  Update since discharge: improved.   Plan of care communicated with patient and caregiver  Just a friendly reminder that you appointment is   Next 5 appointments (look out 90 days)    Dec 14, 2022  3:20 PM  Office Visit with Galindo Brothers MD  [facility] ([facility] ) [address]  [place] [address] [phone]      .  We encourage you to keep this appointment.    Please remember to bring all of your pills in their bottles (including any vitamins or over the counter pills) with you to your appointment.    The patient indicates understanding of these issues and agrees with the plan of care.   Yes    Was the patient contacted within the 2 business days or other approved timeframe?  Yes  Was the Medication reconciliation and management done since the patient was discharged? Yes     Called and wife states that [facility]  [facility]  and were advised to see Dr. Brothers for follow up.      Jessika Oscar RN  12/5/2022 3:41 PM

## 2022-12-05 NOTE — TELEPHONE ENCOUNTER
[facility] [place] .    Patient is going to see when he would be able to get in and see her and if its going to be awhile he will be following up with Dr. Brothers.  Patient will call back later today.  /Jessika Oscar RN on 12/5/2022 at 8:47AM

## 2022-12-06 LAB
BACTERIA BLD CULT: NO GROWTH
BACTERIA BLD CULT: NO GROWTH

## 2022-12-07 LAB
BACTERIA BLD CULT: NO GROWTH
BACTERIA BLD CULT: NO GROWTH

## 2022-12-16 LAB — BACTERIA BLD CULT: ABNORMAL

## 2023-01-22 ENCOUNTER — HEALTH MAINTENANCE LETTER (OUTPATIENT)
Age: 83
End: 2023-01-22

## 2023-02-13 ENCOUNTER — HOSPITAL ENCOUNTER (EMERGENCY)
Facility: OTHER | Age: 83
Discharge: HOME | End: 2023-02-13
Attending: STUDENT IN AN ORGANIZED HEALTH CARE EDUCATION/TRAINING PROGRAM | Admitting: STUDENT IN AN ORGANIZED HEALTH CARE EDUCATION/TRAINING PROGRAM
Payer: COMMERCIAL

## 2023-02-13 ENCOUNTER — APPOINTMENT (OUTPATIENT)
Dept: CT IMAGING | Facility: OTHER | Age: 83
End: 2023-02-13
Attending: STUDENT IN AN ORGANIZED HEALTH CARE EDUCATION/TRAINING PROGRAM
Payer: COMMERCIAL

## 2023-02-13 VITALS
WEIGHT: 235 LBS | SYSTOLIC BLOOD PRESSURE: 139 MMHG | HEIGHT: 68 IN | OXYGEN SATURATION: 91 % | HEART RATE: 57 BPM | RESPIRATION RATE: 18 BRPM | TEMPERATURE: 98.9 F | DIASTOLIC BLOOD PRESSURE: 75 MMHG | BODY MASS INDEX: 35.61 KG/M2

## 2023-02-13 DIAGNOSIS — I99.8 VASCULAR OCCLUSION: ICD-10-CM

## 2023-02-13 DIAGNOSIS — I73.9 CLAUDICATION OF RIGHT LOWER EXTREMITY (H): ICD-10-CM

## 2023-02-13 LAB
ANION GAP SERPL CALCULATED.3IONS-SCNC: 9 MMOL/L (ref 7–15)
APTT PPP: 23 SECONDS (ref 22–38)
BASOPHILS # BLD AUTO: 0.1 10E3/UL (ref 0–0.2)
BASOPHILS NFR BLD AUTO: 1 %
BUN SERPL-MCNC: 29.5 MG/DL (ref 8–23)
CALCIUM SERPL-MCNC: 8.3 MG/DL (ref 8.8–10.2)
CHLORIDE SERPL-SCNC: 104 MMOL/L (ref 98–107)
CREAT SERPL-MCNC: 1.65 MG/DL (ref 0.67–1.17)
D DIMER PPP FEU-MCNC: 0.7 UG/ML FEU (ref 0–0.5)
DEPRECATED HCO3 PLAS-SCNC: 28 MMOL/L (ref 22–29)
EOSINOPHIL # BLD AUTO: 0.2 10E3/UL (ref 0–0.7)
EOSINOPHIL NFR BLD AUTO: 3 %
ERYTHROCYTE [DISTWIDTH] IN BLOOD BY AUTOMATED COUNT: 14.4 % (ref 10–15)
GFR SERPL CREATININE-BSD FRML MDRD: 41 ML/MIN/1.73M2
GLUCOSE SERPL-MCNC: 164 MG/DL (ref 70–99)
HCT VFR BLD AUTO: 40.3 % (ref 40–53)
HGB BLD-MCNC: 12.7 G/DL (ref 13.3–17.7)
HOLD SPECIMEN: NORMAL
HOLD SPECIMEN: NORMAL
IMM GRANULOCYTES # BLD: 0 10E3/UL
IMM GRANULOCYTES NFR BLD: 0 %
INR PPP: 1.03 (ref 0.85–1.15)
LYMPHOCYTES # BLD AUTO: 0.9 10E3/UL (ref 0.8–5.3)
LYMPHOCYTES NFR BLD AUTO: 17 %
MCH RBC QN AUTO: 28.8 PG (ref 26.5–33)
MCHC RBC AUTO-ENTMCNC: 31.5 G/DL (ref 31.5–36.5)
MCV RBC AUTO: 91 FL (ref 78–100)
MONOCYTES # BLD AUTO: 0.4 10E3/UL (ref 0–1.3)
MONOCYTES NFR BLD AUTO: 8 %
NEUTROPHILS # BLD AUTO: 3.5 10E3/UL (ref 1.6–8.3)
NEUTROPHILS NFR BLD AUTO: 71 %
NRBC # BLD AUTO: 0 10E3/UL
NRBC BLD AUTO-RTO: 0 /100
PLATELET # BLD AUTO: 243 10E3/UL (ref 150–450)
POTASSIUM SERPL-SCNC: 4.9 MMOL/L (ref 3.4–5.3)
RBC # BLD AUTO: 4.41 10E6/UL (ref 4.4–5.9)
SODIUM SERPL-SCNC: 141 MMOL/L (ref 136–145)
WBC # BLD AUTO: 5 10E3/UL (ref 4–11)

## 2023-02-13 PROCEDURE — 36415 COLL VENOUS BLD VENIPUNCTURE: CPT | Performed by: STUDENT IN AN ORGANIZED HEALTH CARE EDUCATION/TRAINING PROGRAM

## 2023-02-13 PROCEDURE — 85379 FIBRIN DEGRADATION QUANT: CPT | Performed by: STUDENT IN AN ORGANIZED HEALTH CARE EDUCATION/TRAINING PROGRAM

## 2023-02-13 PROCEDURE — 85610 PROTHROMBIN TIME: CPT | Performed by: STUDENT IN AN ORGANIZED HEALTH CARE EDUCATION/TRAINING PROGRAM

## 2023-02-13 PROCEDURE — 36410 VNPNXR 3YR/> PHY/QHP DX/THER: CPT | Performed by: NURSE ANESTHETIST, CERTIFIED REGISTERED

## 2023-02-13 PROCEDURE — 75635 CT ANGIO ABDOMINAL ARTERIES: CPT

## 2023-02-13 PROCEDURE — 250N000011 HC RX IP 250 OP 636: Performed by: STUDENT IN AN ORGANIZED HEALTH CARE EDUCATION/TRAINING PROGRAM

## 2023-02-13 PROCEDURE — 82374 ASSAY BLOOD CARBON DIOXIDE: CPT | Performed by: STUDENT IN AN ORGANIZED HEALTH CARE EDUCATION/TRAINING PROGRAM

## 2023-02-13 PROCEDURE — 99284 EMERGENCY DEPT VISIT MOD MDM: CPT | Performed by: STUDENT IN AN ORGANIZED HEALTH CARE EDUCATION/TRAINING PROGRAM

## 2023-02-13 PROCEDURE — 82310 ASSAY OF CALCIUM: CPT | Performed by: STUDENT IN AN ORGANIZED HEALTH CARE EDUCATION/TRAINING PROGRAM

## 2023-02-13 PROCEDURE — 99285 EMERGENCY DEPT VISIT HI MDM: CPT | Mod: 25 | Performed by: STUDENT IN AN ORGANIZED HEALTH CARE EDUCATION/TRAINING PROGRAM

## 2023-02-13 PROCEDURE — 85730 THROMBOPLASTIN TIME PARTIAL: CPT | Performed by: STUDENT IN AN ORGANIZED HEALTH CARE EDUCATION/TRAINING PROGRAM

## 2023-02-13 PROCEDURE — 85014 HEMATOCRIT: CPT | Performed by: STUDENT IN AN ORGANIZED HEALTH CARE EDUCATION/TRAINING PROGRAM

## 2023-02-13 RX ORDER — IOPAMIDOL 755 MG/ML
100 INJECTION, SOLUTION INTRAVASCULAR ONCE
Status: COMPLETED | OUTPATIENT
Start: 2023-02-13 | End: 2023-02-13

## 2023-02-13 RX ADMIN — IOPAMIDOL 100 ML: 755 INJECTION, SOLUTION INTRAVENOUS at 15:55

## 2023-02-13 ASSESSMENT — ACTIVITIES OF DAILY LIVING (ADL)
ADLS_ACUITY_SCORE: 33
ADLS_ACUITY_SCORE: 35
ADLS_ACUITY_SCORE: 33

## 2023-02-13 NOTE — ED TRIAGE NOTES
Pt comes in with right leg pain, starts at his hip and goes all the way down his leg, cramping in the calf and states their is bruising behind his knee, [facility] possible [facility]. No redness or swelling      Triage Assessment     Row Name 02/13/23 1039       Triage Assessment (Adult)    Airway WDL WDL       Respiratory WDL    Respiratory WDL WDL       Cardiac WDL    Cardiac WDL WDL       Peripheral/Neurovascular WDL    Peripheral Neurovascular WDL WDL       Cognitive/Neuro/Behavioral WDL    Cognitive/Neuro/Behavioral WDL WDL

## 2023-02-13 NOTE — ED PROVIDER NOTES
History     Chief Complaint   Patient presents with     Leg Pain       Jasson Dougherty is a 82 year old male who presents with right lower extremity pain.  Several weeks of right lower extremity cramping for which she is prescribed a muscle relaxant with no improvement.  Symptoms have progressed to now involve pain in his right lower extremity.  Pain is worsened with exertion. Pain can be present at nighttime.  Also has bruising over his posterior right thigh.  Denies any recent trauma or weakness in the leg.  He does have numbness in the lower leg.  Denies blood clot history, chest pain, dyspnea, fever, chills, nausea, vomiting.    Allergies   Allergen Reactions     Penicillins Rash and Unknown       Patient Active Problem List    Diagnosis Date Noted     Influenza A 11/30/2022     Priority: Medium     Hypoxia 11/30/2022     Priority: Medium     Fever in other diseases 11/30/2022     Priority: Medium     Acute respiratory failure with hypoxia (H) 11/30/2022     Priority: Medium     Right shoulder pain 06/01/2016     Priority: Medium     Primary osteoarthritis of left wrist 04/12/2016     Priority: Medium     Carotid stenosis, right 01/11/2016     Priority: Medium     Cerebrovascular accident (CVA) due to embolism of right middle cerebral artery (H) 01/11/2016     Priority: Medium     CVA, old, monoplegia upper limb 12/23/2015     Priority: Medium     Malignant neoplasm of descending colon (H) 12/23/2015     Priority: Medium     MI, old 12/23/2015     Priority: Medium     Stenosis of right carotid artery 12/23/2015     Priority: Medium     Sleep apnea 08/05/2015     Priority: Medium     Rectal cancer (H) 06/29/2015     Priority: Medium     Arthrodesis status 06/26/2015     Priority: Medium     Overview:   [facility]       Morbid obesity (H) 06/26/2015     Priority: Medium     Overview:   Complicated by severe DJD in back and LEs, Diabetes, DAKOTA, GERD, Hypertension.       Malignant neoplasm  of rectum (H) 2015     Priority: Medium     Routine general medical examination at a health care facility 2013     Priority: Medium     Type 2 diabetes mellitus without complication (H) 2012     Priority: Medium     Overview:   [facility]       Backache 2012     Priority: Medium     Hypercholesteremia 2009     Priority: Medium     Overview:   IMO Update 10/11       Esophageal reflux 2004     Priority: Medium     Overview:   GORDON       HTN (hypertension) 2004     Priority: Medium     Overview:   HTN         No past medical history on file.    Past Surgical History:   Procedure Laterality Date     APPENDECTOMY OPEN      No Comments Provided     BACK SURGERY      fusion     COLONOSCOPY           OTHER SURGICAL HISTORY      EMQ659,HEMICOLECTOMY     OTHER SURGICAL HISTORY      SUR85,ANKLE ARTHROSCOPY       No family history on file.    Social History     Tobacco Use     Smoking status: Former     Types: Cigarettes     Quit date: 1995     Years since quittin.1     Smokeless tobacco: Never   Substance Use Topics     Alcohol use: No     Alcohol/week: 0.0 standard drinks     Drug use: Unknown     Types: Other     Comment: Drug use: No       Medications:    acetaminophen (TYLENOL) 500 MG tablet  amLODIPine (NORVASC) 10 MG tablet  aspirin EC 81 MG EC tablet  blood glucose (NO BRAND SPECIFIED) test strip  Blood Glucose Monitoring Suppl (FIFTY50 GLUCOSE METER 2.0) W/DEVICE KIT  Blood Glucose Monitoring Suppl (ONE TOUCH BASIC SYSTEM) W/DEVICE KIT  cyanocobalamin (CYANOCOBALAMIN) 1000 MCG/ML injection  gabapentin (NEURONTIN) 300 MG capsule  hydrochlorothiazide (HYDRODIURIL) 25 MG tablet  insulin aspart (NOVOLOG PEN) 100 UNIT/ML injection  insulin glargine (LANTUS PEN) 100 UNIT/ML pen  insulin pen needle 32G X 8 MM  lancets 28G MISC  lisinopril (ZESTRIL) 40 MG tablet  metoprolol tartrate (LOPRESSOR) 50 MG tablet  nitroGLYcerin (NITROSTAT) 0.4 MG sublingual  "tablet  omeprazole 20 MG tablet  ONE TOUCH LANCETS MISC  pravastatin (PRAVACHOL) 40 MG tablet  tamsulosin (FLOMAX) 0.4 MG capsule  Vitamin D, Cholecalciferol, 25 MCG (1000 UT) TABS        Review of Systems: See HPI for pertinent negatives and positives. All other systems reviewed and found to be negative.    Physical Exam   BP (!) 157/60   Pulse 52   Temp 98.9  F (37.2  C) (Temporal)   Resp 18   Ht 1.727 m (5' 8\")   Wt 106.6 kg (235 lb)   SpO2 92%   BMI 35.73 kg/m       General: awake, comfortable  HEENT: atraumatic  Respiratory: normal effort, clear to auscultation bilaterally  Cardiovascular: regular rate and rhythm, no murmurs, no right DP and TP pulses or dop tones, dependent rubor on right with examination  Abdomen: soft, nondistended, nontender  Extremities: no deformities, edema, or tenderness; no pain in the right lower extremity including hip external/internal rotation and knee flexion extension  Skin: warm, dry, no rashes or lesions, right foot cooler vs left  Neuro: alert, decreased right lower extremity sensation vs left  Psych: appropriate mood and affect    ED Course      ED Course as of 02/13/23 1736   Mon Feb 13, 2023   1700 No doppler tones found at right DP and TP locations (vs these were present on left).       Results for orders placed or performed during the hospital encounter of 02/13/23 (from the past 24 hour(s))   CBC with platelets differential    Narrative    The following orders were created for panel order CBC with platelets differential.  Procedure                               Abnormality         Status                     ---------                               -----------         ------                     CBC with platelets and d...[[id number]]  Abnormal            Final result                 Please view results for these tests on the individual orders.   INR   Result Value Ref Range    INR 1.03 0.85 - 1.15   Partial thromboplastin time   Result Value Ref Range    aPTT 23 22 - " 38 Seconds   Basic metabolic panel   Result Value Ref Range    Sodium 141 136 - 145 mmol/L    Potassium 4.9 3.4 - 5.3 mmol/L    Chloride 104 98 - 107 mmol/L    Carbon Dioxide (CO2) 28 22 - 29 mmol/L    Anion Gap 9 7 - 15 mmol/L    Urea Nitrogen 29.5 (H) 8.0 - 23.0 mg/dL    Creatinine 1.65 (H) 0.67 - 1.17 mg/dL    Calcium 8.3 (L) 8.8 - 10.2 mg/dL    Glucose 164 (H) 70 - 99 mg/dL    GFR Estimate 41 (L) >60 mL/min/1.73m2   D dimer quantitative   Result Value Ref Range    D-Dimer Quantitative 0.70 (H) 0.00 - 0.50 ug/mL FEU    Narrative    This D-dimer assay is intended for use in conjunction with a clinical pretest probability assessment model to exclude pulmonary embolism (PE) and deep venous thrombosis (DVT) in outpatients suspected of PE or DVT. The cut-off value is 0.50 ug/mL FEU.   CBC with platelets and differential   Result Value Ref Range    WBC Count 5.0 4.0 - 11.0 10e3/uL    RBC Count 4.41 4.40 - 5.90 10e6/uL    Hemoglobin 12.7 (L) 13.3 - 17.7 g/dL    Hematocrit 40.3 40.0 - 53.0 %    MCV 91 78 - 100 fL    MCH 28.8 26.5 - 33.0 pg    MCHC 31.5 31.5 - 36.5 g/dL    RDW 14.4 10.0 - 15.0 %    Platelet Count 243 150 - 450 10e3/uL    % Neutrophils 71 %    % Lymphocytes 17 %    % Monocytes 8 %    % Eosinophils 3 %    % Basophils 1 %    % Immature Granulocytes 0 %    NRBCs per 100 WBC 0 <1 /100    Absolute Neutrophils 3.5 1.6 - 8.3 10e3/uL    Absolute Lymphocytes 0.9 0.8 - 5.3 10e3/uL    Absolute Monocytes 0.4 0.0 - 1.3 10e3/uL    Absolute Eosinophils 0.2 0.0 - 0.7 10e3/uL    Absolute Basophils 0.1 0.0 - 0.2 10e3/uL    Absolute Immature Granulocytes 0.0 <=0.4 10e3/uL    Absolute NRBCs 0.0 10e3/uL   [place] Draw    Narrative    The following orders were created for panel order [place] Draw.  Procedure                               Abnormality         Status                     ---------                               -----------         ------                     Extra Red Top Tube[[id number]]                                Final result               Extra Green Top (Lithium...[[id number]]                      Final result                 Please view results for these tests on the individual orders.   Extra Red Top Tube   Result Value Ref Range    Hold Specimen JIC    Extra Green Top (Lithium Heparin) Tube   Result Value Ref Range    Hold Specimen JIC    CTA Abdomen Pelvis Bilat Leg Runoff w Contr    Narrative    PROCEDURE: CTA ABDOMEN PELVIS BILAT LEG RUNOFF W CONTR   2/13/2023 3:51 PM    HISTORY:Male, age,  82 years, , , RLE exertional pain, dependent  rubor, posterior thigh discoloration    COMPARISON: No relevant prior imaging    TECHNIQUE:   CT angiogram was performed of the abdomen, pelvis and lower  extremities before and after the administration of intravenous  contrast.     Sagittal, coronal, axial and 3-D reconstructed MIP  images were  reviewed.    FINDINGS:     Abdomen Pelvis CT Angiogram:    Abdominal aorta: Scattered calcified noncalcified atherosclerotic  plaque throughout the abdominal aorta without evidence of well-defined  aneurysm. Dense atherosclerotic calcifications are seen in the  proximal portions of the celiac axis as well as the superior  mesenteric and renal arteries with suggestion of high-grade stenosis  involving the proximal portions of each of these vessels. The inferior  mesenteric artery is patent. Moderate stenosis is seen in the proximal  portions of the left common iliac artery secondary to mixed calcified  noncalcified plaque. There is also moderate stenosis seen in the  distal portions the left common iliac artery. There is complete  occlusion involving the proximal portions of the right internal iliac  artery. Mild/moderate long segment disease is seen to the left and  right external iliac arteries.    CTA right lower extremity: Mild long segment disease is seen within  the left and right common femoral arteries with moderate stenosis  involving the distal aspects of the right common femoral  artery. There  is high-grade stenosis and occlusion involving the proximal portions  of the right superficial femoral artery with reconstitution of the  right popliteal artery via collateral vessels. The distal aspects the  right popliteal artery are occluded with dense plaque. The  tibioperoneal trunk is reconstituted via collateral vessels with  three-vessel runoff to the right foot.     CTA left lower extremity: There is mild long segment disease within  the left common, deep femoral arteries with moderate disease  throughout the left superficial femoral artery. The left popliteal  artery is patent. Tibioperoneal trunk is patent with two-vessel runoff  to the left foot. The left anterior tibial artery is occluded.    CT scan abdomen and pelvis: Chronic changes including mosaic  attenuation, scarring, atelectasis and emphysema are seen at the lung  bases. Visualized portions of the heart demonstrate no acute  abnormality. Coronary artery calcifications are present. The stomach  and duodenum: Small hiatal hernia. No acute abnormality.    Liver: Unremarkable    Gallbladder: Unremarkable.    Spleen: Unremarkable    Pancreas: Moderate generalized atrophy with fatty infiltration. There  is a 9.6 mm lobulated enhancing lesion involving the head of the  pancreas.    Adrenal glands: Unremarkable.    Kidneys: Low dense renal lesions suggesting cortical cysts.    Ureters: Visualized portions are unremarkable.    Urinary bladder: Small diverticulum arising from the right  dorsolateral aspect of the urinary bladder with mild wall thickening.    Large and small bowel: Postoperative changes in the sigmoid colon.  Diverticulosis of colon. No evidence of apparent diverticulitis.  Moderate volume of stool is seen throughout the colon.    A large right-sided fat-containing inguinal hernia is present.    Bony structures: Generalized osteopenia/osteoporosis with sclerotic  changes of the SI joints. Degenerative changes and  postoperative  changes are also seen within the lower lumbar spine. Postoperative  changes are seen within the left and right ankle consistent with  tibiotalar fusion.        Impression    IMPRESSION: Severe atherosclerotic disease.  Complete occlusion of the right superficial femoral artery in its  proximal aspects with reconstitution of the popliteal artery via  collateral flow.     The distal aspects of the right popliteal artery are occluded with  reconstitution of the tibioperoneal trunk via collateral flow.    Complete occlusion of the right internal iliac artery in its proximal  portions.    Additional nonacute findings as described above.      SUZI PIERRE MD         SYSTEM ID:  [id number]       Medications   iopamidol (ISOVUE-370) solution 100 mL (100 mLs Intravenous Given 2/13/23 1555)       Assessments & Plan (with Medical Decision Making)     I have reviewed the nursing notes.    82 year old male evaluated for progressive right lower leg claudication symptoms.  Right lower leg pulses unable to detect and foot is cool with decreased sensation.  CTA demonstrates vascular occlusions involving the right superficial femoral artery and right popliteal artery both with reconstitution collateral flow, and complete occlusion of the right internal iliac artery and its proximal portions.  [facility] vascular surgery was consulted with plan for very close follow-up per patient instructions below.  No current treatments at discharge were recommended by vascular surgery.  Attached instructions on diagnosis including ED return precautions given.     I have reviewed the findings, diagnosis, plan, and need for any follow up with the patient.    Patient instructions:   You have arteries in your right lower leg that are blocked and therefore no to very little blood is getting to your lower leg and foot.    Please call [facility] vascular surgery tomorrow morning at [phone] to schedule appointment for  this Wednesday, 2/15/2023 to discuss treatment options.    Return to the ER if you develop intolerable right leg pain at rest.    New Prescriptions    No medications on file       Final diagnoses:   Claudication of right lower extremity (H)   Vascular occlusion       2/13/2023   [facility]     Ranulfo Sena MD  02/13/23 9732

## 2023-02-13 NOTE — DISCHARGE INSTRUCTIONS
You have arteries in your right lower leg that are blocked and therefore no to very little blood is getting to your lower leg and foot.    Please call [facility] vascular surgery tomorrow morning at [phone] to schedule appointment for this Wednesday, 2/15/2023 to discuss treatment options.    Return to the ER if you develop intolerable right leg pain at rest.

## 2023-06-07 ENCOUNTER — HOSPITAL ENCOUNTER (EMERGENCY)
Facility: OTHER | Age: 83
Discharge: HOME | End: 2023-06-07
Attending: PHYSICIAN ASSISTANT | Admitting: PHYSICIAN ASSISTANT
Payer: COMMERCIAL

## 2023-06-07 ENCOUNTER — APPOINTMENT (OUTPATIENT)
Dept: GENERAL RADIOLOGY | Facility: OTHER | Age: 83
End: 2023-06-07
Attending: PHYSICIAN ASSISTANT
Payer: COMMERCIAL

## 2023-06-07 VITALS
HEART RATE: 47 BPM | BODY MASS INDEX: 38.62 KG/M2 | OXYGEN SATURATION: 98 % | TEMPERATURE: 97 F | RESPIRATION RATE: 12 BRPM | DIASTOLIC BLOOD PRESSURE: 76 MMHG | WEIGHT: 254 LBS | SYSTOLIC BLOOD PRESSURE: 192 MMHG

## 2023-06-07 DIAGNOSIS — I10 HYPERTENSION, UNSPECIFIED TYPE: ICD-10-CM

## 2023-06-07 DIAGNOSIS — R60.9 EDEMA, UNSPECIFIED TYPE: ICD-10-CM

## 2023-06-07 LAB
ANION GAP SERPL CALCULATED.3IONS-SCNC: 10 MMOL/L (ref 7–15)
BUN SERPL-MCNC: 31.6 MG/DL (ref 8–23)
CALCIUM SERPL-MCNC: 8.7 MG/DL (ref 8.8–10.2)
CHLORIDE SERPL-SCNC: 104 MMOL/L (ref 98–107)
CREAT SERPL-MCNC: 1.39 MG/DL (ref 0.67–1.17)
DEPRECATED HCO3 PLAS-SCNC: 28 MMOL/L (ref 22–29)
GFR SERPL CREATININE-BSD FRML MDRD: 50 ML/MIN/1.73M2
GLUCOSE SERPL-MCNC: 72 MG/DL (ref 70–99)
HOLD SPECIMEN: NORMAL
NT-PROBNP SERPL-MCNC: 468 PG/ML (ref 0–1800)
POTASSIUM SERPL-SCNC: 4.2 MMOL/L (ref 3.4–5.3)
SODIUM SERPL-SCNC: 142 MMOL/L (ref 136–145)
TROPONIN T SERPL HS-MCNC: 30 NG/L
TROPONIN T SERPL HS-MCNC: 30 NG/L

## 2023-06-07 PROCEDURE — 93010 ELECTROCARDIOGRAM REPORT: CPT | Performed by: INTERNAL MEDICINE

## 2023-06-07 PROCEDURE — 93005 ELECTROCARDIOGRAM TRACING: CPT

## 2023-06-07 PROCEDURE — 83880 ASSAY OF NATRIURETIC PEPTIDE: CPT | Performed by: PHYSICIAN ASSISTANT

## 2023-06-07 PROCEDURE — 99284 EMERGENCY DEPT VISIT MOD MDM: CPT | Performed by: PHYSICIAN ASSISTANT

## 2023-06-07 PROCEDURE — 250N000011 HC RX IP 250 OP 636: Performed by: PHYSICIAN ASSISTANT

## 2023-06-07 PROCEDURE — 84484 ASSAY OF TROPONIN QUANT: CPT | Performed by: PHYSICIAN ASSISTANT

## 2023-06-07 PROCEDURE — 71045 X-RAY EXAM CHEST 1 VIEW: CPT

## 2023-06-07 PROCEDURE — 80048 BASIC METABOLIC PNL TOTAL CA: CPT | Performed by: PHYSICIAN ASSISTANT

## 2023-06-07 PROCEDURE — 96374 THER/PROPH/DIAG INJ IV PUSH: CPT

## 2023-06-07 PROCEDURE — 36415 COLL VENOUS BLD VENIPUNCTURE: CPT | Performed by: PHYSICIAN ASSISTANT

## 2023-06-07 PROCEDURE — 99285 EMERGENCY DEPT VISIT HI MDM: CPT | Mod: 25

## 2023-06-07 RX ORDER — FUROSEMIDE 10 MG/ML
40 INJECTION INTRAMUSCULAR; INTRAVENOUS ONCE
Status: COMPLETED | OUTPATIENT
Start: 2023-06-07 | End: 2023-06-07

## 2023-06-07 RX ADMIN — FUROSEMIDE 40 MG: 10 INJECTION, SOLUTION INTRAVENOUS at 19:10

## 2023-06-07 ASSESSMENT — ACTIVITIES OF DAILY LIVING (ADL): ADLS_ACUITY_SCORE: 35

## 2023-06-07 NOTE — ED TRIAGE NOTES
Pt arrives via private vehicle with c/o swelling and being puffy over the last two weeks. [facility] and [facility] to come [facility]. Pt reports a 25 pound weight gain over the last two weeks. Pt appears calm, cooperative, alert, and oriented in triage.      Triage Assessment     Row Name 06/07/23 1514       Triage Assessment (Adult)    Airway WDL WDL       Respiratory WDL    Respiratory WDL WDL       Skin Circulation/Temperature WDL    Skin Circulation/Temperature WDL WDL       Cardiac WDL    Cardiac WDL WDL       Peripheral/Neurovascular WDL    Peripheral Neurovascular WDL WDL       Cognitive/Neuro/Behavioral WDL    Cognitive/Neuro/Behavioral WDL WDL

## 2023-06-08 LAB
ATRIAL RATE - MUSE: 50 BPM
DIASTOLIC BLOOD PRESSURE - MUSE: NORMAL MMHG
INTERPRETATION ECG - MUSE: NORMAL
P AXIS - MUSE: 66 DEGREES
PR INTERVAL - MUSE: 214 MS
QRS DURATION - MUSE: 142 MS
QT - MUSE: 476 MS
QTC - MUSE: 433 MS
R AXIS - MUSE: 16 DEGREES
SYSTOLIC BLOOD PRESSURE - MUSE: NORMAL MMHG
T AXIS - MUSE: 35 DEGREES
VENTRICULAR RATE- MUSE: 50 BPM

## 2023-06-08 NOTE — ED PROVIDER NOTES
Chief Complaint:  Swelling       HPI   Jasson Dougherty is a 83 year old male who presents with significant other for evaluation of lower extremity swelling being puffy all over for the last 2 weeks [facility] 20 mg of Lasix shows some mild relief but continues to have some increasing fluid retention he reports a 25 pound weight gain over the last 2 weeks he is afebrile he is nontoxic he is mildly short of breath he does not have any headaches dizziness numbers disturbances no cough no neck pain chest pain shortness of breath does not have any trauma.  He presents today for further evaluation..    Independent Historian: Patient provides history    Review of External Notes: Reviewed    ROS:  Please see HPI for pertinent positives and negatives.  All other systems reviewed and found to be negative.     Allergies:  Penicillins     Medications:    acetaminophen (TYLENOL) 500 MG tablet  amLODIPine (NORVASC) 10 MG tablet  aspirin EC 81 MG EC tablet  blood glucose (NO BRAND SPECIFIED) test strip  Blood Glucose Monitoring Suppl (FIFTY50 GLUCOSE METER 2.0) W/DEVICE KIT  Blood Glucose Monitoring Suppl (ONE TOUCH BASIC SYSTEM) W/DEVICE KIT  cyanocobalamin (CYANOCOBALAMIN) 1000 MCG/ML injection  gabapentin (NEURONTIN) 300 MG capsule  hydrochlorothiazide (HYDRODIURIL) 25 MG tablet  insulin aspart (NOVOLOG PEN) 100 UNIT/ML injection  insulin glargine (LANTUS PEN) 100 UNIT/ML pen  insulin pen needle 32G X 8 MM  lancets 28G MISC  lisinopril (ZESTRIL) 40 MG tablet  metoprolol tartrate (LOPRESSOR) 50 MG tablet  nitroGLYcerin (NITROSTAT) 0.4 MG sublingual tablet  omeprazole 20 MG tablet  ONE TOUCH LANCETS MISC  pravastatin (PRAVACHOL) 40 MG tablet  tamsulosin (FLOMAX) 0.4 MG capsule  Vitamin D, Cholecalciferol, 25 MCG (1000 UT) TABS        Past Medical History:    History reviewed. No pertinent past medical history.    Past Surgical History:    Past Surgical History:   Procedure Laterality Date      APPENDECTOMY OPEN      No Comments Provided     BACK SURGERY      fusion     COLONOSCOPY      2015     OTHER SURGICAL HISTORY      [id number],HEMICOLECTOMY     OTHER SURGICAL HISTORY      SUR85,ANKLE ARTHROSCOPY        Family History:    family history is not on file.    Social History:   reports that he quit smoking about 27 years ago. His smoking use included cigarettes. He has never used smokeless tobacco. He reports that he does not drink alcohol and does not use drugs.  PCP: Luana Jacobo     Physical Exam     Patient Vitals for the past 24 hrs:   BP Temp Temp src Pulse Resp SpO2 Weight   06/07/23 1900 (!) 192/76 -- -- (!) 47 12 98 % --   06/07/23 1845 (!) 170/81 -- -- (!) 46 17 97 % --   06/07/23 1830 (!) 173/91 -- -- 50 14 97 % --   06/07/23 1815 (!) 162/72 -- -- 50 20 -- --   06/07/23 1800 -- -- -- (!) 47 14 -- --   06/07/23 1745 (!) 180/67 -- -- (!) 47 14 -- --   06/07/23 1730 (!) 200/81 97  F (36.1  C) Tympanic 52 -- 93 % --   06/07/23 1514 (!) 150/65 -- -- -- -- -- --   06/07/23 1513 -- 98.4  F (36.9  C) -- 57 16 92 % 115.2 kg (254 lb)        Vitals:    06/07/23 1815 06/07/23 1830 06/07/23 1845 06/07/23 1900   BP: (!) 162/72 (!) 173/91 (!) 170/81 (!) 192/76   BP Location:       Pulse: 50 50 (!) 46 (!) 47   Resp: 20 14 17 12   Temp:       TempSrc:       SpO2:  97% 97% 98%   Weight:           Physical Exam  Exam:  Constitutional: healthy, alert and no distress  Head: Normocephalic.    Neck: Neck supple   ENT: ENT exam normal, no neck nodes or sinus tenderness  Cardiovascular RRR no murmurs, clicks gallops or rub  Respiratory:  Lungs diminished  Gastrointestinal: Abdomen soft, obese, non-tender. BS normal. No masses, organomegaly  : Deferred  Musculoskeletal: 1+ edema to the lower extremities.  Skin: no suspicious lesions or rashes  Neurologic: Gait normal. Reflexes normal and symmetric. Sensation grossly WNL.  Psychiatric: mentation appears normal and affect normal/bright         Emergency Department Course    ECG:  No results found for this or any previous visit.         EKG Interpretation:      Interpreted by Kvng Lynn PA-C  Time reviewed:1826  Symptoms at time of EKG: Lower extremity swelling  Rhythm: Sinus bradycardia with first-degree block PAC right bundle branch block  Rate: 50  ST Segments/ T Waves: No ST-T wave changes and No acute ischemic changes  Clinical Impression: Sinus bradycardia with first-degree block, PACs noted    Imaging:  XR Chest Port 1 View   Final Result   IMPRESSION:  Borderline cardiomegaly.        CLAYTON CHAMBERS MD            SYSTEM ID:  [id number]         Report per radiology    Laboratory:  Labs Ordered and Resulted from Time of ED Arrival to Time of ED Departure   TROPONIN T, HIGH SENSITIVITY - Abnormal       Result Value    Troponin T, High Sensitivity 30 (*)    BASIC METABOLIC PANEL - Abnormal    Sodium 142      Potassium 4.2      Chloride 104      Carbon Dioxide (CO2) 28      Anion Gap 10      Urea Nitrogen 31.6 (*)     Creatinine 1.39 (*)     Calcium 8.7 (*)     Glucose 72      GFR Estimate 50 (*)    TROPONIN T, HIGH SENSITIVITY - Abnormal    Troponin T, High Sensitivity 30 (*)    NT PROBNP INPATIENT - Normal    N terminal Pro BNP Inpatient 468          Procedures       Emergency Department Course & Assessments:             Interventions:  Medications   furosemide (LASIX) injection 40 mg (40 mg Intravenous $Given 6/7/23 1910)        Orders Place This Encounter:  Orders Placed This Encounter   Procedures     XR Chest Port 1 View     Troponin T, High Sensitivity     NT pro BNP     Extra Tube     Extra Blue Top Tube     Extra Serum Separator Tube (SST)     Extra Green Top (Lithium Heparin) Tube     Extra Purple Top Tube     Basic metabolic panel     Troponin T, High Sensitivity     EKG 12 lead       Independent Interpretation (X-rays, CTs, rhythm strip):  Reviewed imaging    Consultations/Discussion of Management or Tests:  None    Social Determinants of Health affecting  care:    Social Determinants of Health     Tobacco Use: Medium Risk (6/7/2023)    Patient History      Smoking Tobacco Use: Former      Smokeless Tobacco Use: Never      Passive Exposure: Not on file   Alcohol Use: Not on file   Financial Resource Strain: Not on file   Food Insecurity: Not on file   Transportation Needs: Not on file   Physical Activity: Not on file   Stress: Not on file   Social Connections: Not on file   Intimate Partner Violence: Not on file   Depression: Not on file   Housing Stability: Not on file       At this time the patient does not have any concerns for food security, transportation, healthcare access and the patient currently lives at home.      Disposition:  Home    Impression & Plan    CMS Diagnoses:       Medical Decision Making:    RN & Ancillary Notes:    I have reviewed nursing & ancillary notes, and agree with protocol as initiated.      Differential diagnosis at this point include: asthma, COPD exacerbation, bronchitis, pulmonary edema, acute coronary syndromes, pulmonary embolism, pneumonia, pneumothorax,  Pleasant gentleman who presents emergency department for some lower extremity edema he is followed closely with his primary care provider does have a history of hypertension he has antihypertensives and is also been taking some Lasix.  He noticed increase in edema to his lower extremities over the past 2 weeks.  He gained 25 pounds over the last 2 weeks.  He does have some pitting edema to the lower extremities 1+  Received 40 mg of Lasix IV he is diuresing reevaluation of his lower extremities he has no edema.  Has significantly improved I would encourage him close follow-up for reevaluation of his hypertension he may need further agents.  He will monitor his symptoms and if symptoms worsen he return the emergency department his laboratory studies are noted as well as his EKG.  I explained my diagnostic considerations and recommendations and the patient voiced an understanding  and was in agreement with the treatment plan. All questions were answered. We discussed potential side effects of any prescribed or recommended therapies, as well as expectations for response to treatments.    Diagnosis:    ICD-10-CM    1. Hypertension, unspecified type  I10       2. Edema, unspecified type  R60.9            Discharge Medications:  New Prescriptions    No medications on file        6/7/2023   Kvng Lynn PA-C  [facility], [facility]       Kvng Lynn PA-C  06/07/23 2031

## 2023-06-16 ENCOUNTER — DOCUMENTATION ONLY (OUTPATIENT)
Dept: FAMILY MEDICINE | Facility: OTHER | Age: 83
End: 2023-06-16

## 2023-06-16 NOTE — PROGRESS NOTES
Chart entered to update documentation - paperwork faxed from BRUNA Jackson PA-C  6/16/2023  8:05 AM

## 2023-07-29 ENCOUNTER — HEALTH MAINTENANCE LETTER (OUTPATIENT)
Age: 83
End: 2023-07-29

## 2023-09-20 ENCOUNTER — HOSPITAL ENCOUNTER (EMERGENCY)
Facility: OTHER | Age: 83
Discharge: ANOTHER HOSPITAL | End: 2023-09-21
Attending: PHYSICIAN ASSISTANT | Admitting: PHYSICIAN ASSISTANT
Payer: COMMERCIAL

## 2023-09-20 ENCOUNTER — ANESTHESIA (OUTPATIENT)
Dept: EMERGENCY MEDICINE | Facility: OTHER | Age: 83
End: 2023-09-20
Payer: COMMERCIAL

## 2023-09-20 ENCOUNTER — APPOINTMENT (OUTPATIENT)
Dept: GENERAL RADIOLOGY | Facility: OTHER | Age: 83
End: 2023-09-20
Attending: PHYSICIAN ASSISTANT
Payer: COMMERCIAL

## 2023-09-20 ENCOUNTER — APPOINTMENT (OUTPATIENT)
Dept: CT IMAGING | Facility: OTHER | Age: 83
End: 2023-09-20
Attending: PHYSICIAN ASSISTANT
Payer: COMMERCIAL

## 2023-09-20 ENCOUNTER — ANESTHESIA EVENT (OUTPATIENT)
Dept: EMERGENCY MEDICINE | Facility: OTHER | Age: 83
End: 2023-09-20
Payer: COMMERCIAL

## 2023-09-20 DIAGNOSIS — I51.7 CARDIOMEGALY: ICD-10-CM

## 2023-09-20 DIAGNOSIS — J96.02 ACUTE RESPIRATORY FAILURE WITH HYPERCAPNIA (H): ICD-10-CM

## 2023-09-20 DIAGNOSIS — J18.9 COMMUNITY ACQUIRED PNEUMONIA: ICD-10-CM

## 2023-09-20 DIAGNOSIS — I21.4 NSTEMI (NON-ST ELEVATED MYOCARDIAL INFARCTION) (H): Primary | ICD-10-CM

## 2023-09-20 DIAGNOSIS — E83.42 HYPOMAGNESEMIA: ICD-10-CM

## 2023-09-20 DIAGNOSIS — I50.9 CHF (CONGESTIVE HEART FAILURE) (H): ICD-10-CM

## 2023-09-20 DIAGNOSIS — N28.1 RENAL CYST, LEFT: ICD-10-CM

## 2023-09-20 LAB
ALBUMIN SERPL BCG-MCNC: 4.1 G/DL (ref 3.5–5.2)
ALP SERPL-CCNC: 77 U/L (ref 40–129)
ALT SERPL W P-5'-P-CCNC: 11 U/L (ref 0–70)
ANION GAP SERPL CALCULATED.3IONS-SCNC: 13 MMOL/L (ref 7–15)
AST SERPL W P-5'-P-CCNC: 17 U/L (ref 0–45)
BASE EXCESS BLDV CALC-SCNC: 1.8 MMOL/L (ref -7.7–1.9)
BASOPHILS # BLD AUTO: 0 10E3/UL (ref 0–0.2)
BASOPHILS NFR BLD AUTO: 0 %
BILIRUB SERPL-MCNC: 0.3 MG/DL
BUN SERPL-MCNC: 18.1 MG/DL (ref 8–23)
CALCIUM SERPL-MCNC: 9.1 MG/DL (ref 8.8–10.2)
CHLORIDE SERPL-SCNC: 102 MMOL/L (ref 98–107)
CREAT SERPL-MCNC: 1.12 MG/DL (ref 0.67–1.17)
D DIMER PPP FEU-MCNC: 0.87 UG/ML FEU (ref 0–0.5)
DEPRECATED HCO3 PLAS-SCNC: 26 MMOL/L (ref 22–29)
EGFRCR SERPLBLD CKD-EPI 2021: 64 ML/MIN/1.73M2
EOSINOPHIL # BLD AUTO: 0.1 10E3/UL (ref 0–0.7)
EOSINOPHIL NFR BLD AUTO: 1 %
ERYTHROCYTE [DISTWIDTH] IN BLOOD BY AUTOMATED COUNT: 13.8 % (ref 10–15)
FLUAV RNA SPEC QL NAA+PROBE: NEGATIVE
FLUBV RNA RESP QL NAA+PROBE: NEGATIVE
GLUCOSE SERPL-MCNC: 97 MG/DL (ref 70–99)
HCO3 BLDV-SCNC: 29 MMOL/L (ref 21–28)
HCT VFR BLD AUTO: 45.5 % (ref 40–53)
HGB BLD-MCNC: 13.9 G/DL (ref 13.3–17.7)
HOLD SPECIMEN: NORMAL
IMM GRANULOCYTES # BLD: 0 10E3/UL
IMM GRANULOCYTES NFR BLD: 0 %
LACTATE SERPL-SCNC: 1.2 MMOL/L (ref 0.7–2)
LYMPHOCYTES # BLD AUTO: 0.6 10E3/UL (ref 0.8–5.3)
LYMPHOCYTES NFR BLD AUTO: 6 %
MAGNESIUM SERPL-MCNC: 1.6 MG/DL (ref 1.7–2.3)
MCH RBC QN AUTO: 28.3 PG (ref 26.5–33)
MCHC RBC AUTO-ENTMCNC: 30.5 G/DL (ref 31.5–36.5)
MCV RBC AUTO: 93 FL (ref 78–100)
MONOCYTES # BLD AUTO: 0.7 10E3/UL (ref 0–1.3)
MONOCYTES NFR BLD AUTO: 7 %
NEUTROPHILS # BLD AUTO: 8.5 10E3/UL (ref 1.6–8.3)
NEUTROPHILS NFR BLD AUTO: 86 %
NRBC # BLD AUTO: 0 10E3/UL
NRBC BLD AUTO-RTO: 0 /100
NT-PROBNP SERPL-MCNC: 3642 PG/ML (ref 0–1800)
O2/TOTAL GAS SETTING VFR VENT: 29 %
OXYHGB MFR BLDV: 69 % (ref 70–75)
PCO2 BLDV: 56 MM HG (ref 40–50)
PH BLDV: 7.33 [PH] (ref 7.32–7.43)
PLATELET # BLD AUTO: 227 10E3/UL (ref 150–450)
PO2 BLDV: 39 MM HG (ref 25–47)
POTASSIUM SERPL-SCNC: 4.2 MMOL/L (ref 3.4–5.3)
PROCALCITONIN SERPL IA-MCNC: 0.06 NG/ML
PROT SERPL-MCNC: 7 G/DL (ref 6.4–8.3)
RBC # BLD AUTO: 4.92 10E6/UL (ref 4.4–5.9)
RSV RNA SPEC NAA+PROBE: NEGATIVE
SARS-COV-2 RNA RESP QL NAA+PROBE: NEGATIVE
SODIUM SERPL-SCNC: 141 MMOL/L (ref 136–145)
TROPONIN T SERPL HS-MCNC: 163 NG/L
TROPONIN T SERPL HS-MCNC: 84 NG/L
TSH SERPL DL<=0.005 MIU/L-ACNC: 1.77 UIU/ML (ref 0.3–4.2)
WBC # BLD AUTO: 10 10E3/UL (ref 4–11)

## 2023-09-20 PROCEDURE — 99291 CRITICAL CARE FIRST HOUR: CPT | Mod: 25 | Performed by: PHYSICIAN ASSISTANT

## 2023-09-20 PROCEDURE — 250N000011 HC RX IP 250 OP 636: Performed by: PHYSICIAN ASSISTANT

## 2023-09-20 PROCEDURE — 250N000009 HC RX 250

## 2023-09-20 PROCEDURE — 36410 VNPNXR 3YR/> PHY/QHP DX/THER: CPT

## 2023-09-20 PROCEDURE — 71045 X-RAY EXAM CHEST 1 VIEW: CPT

## 2023-09-20 PROCEDURE — 93005 ELECTROCARDIOGRAM TRACING: CPT | Mod: 76 | Performed by: PHYSICIAN ASSISTANT

## 2023-09-20 PROCEDURE — 250N000013 HC RX MED GY IP 250 OP 250 PS 637: Performed by: PHYSICIAN ASSISTANT

## 2023-09-20 PROCEDURE — 93010 ELECTROCARDIOGRAM REPORT: CPT | Mod: 76 | Performed by: STUDENT IN AN ORGANIZED HEALTH CARE EDUCATION/TRAINING PROGRAM

## 2023-09-20 PROCEDURE — 258N000003 HC RX IP 258 OP 636: Performed by: PHYSICIAN ASSISTANT

## 2023-09-20 PROCEDURE — C9803 HOPD COVID-19 SPEC COLLECT: HCPCS | Performed by: PHYSICIAN ASSISTANT

## 2023-09-20 PROCEDURE — 85379 FIBRIN DEGRADATION QUANT: CPT | Performed by: PHYSICIAN ASSISTANT

## 2023-09-20 PROCEDURE — 250N000011 HC RX IP 250 OP 636: Mod: JZ | Performed by: PHYSICIAN ASSISTANT

## 2023-09-20 PROCEDURE — 80053 COMPREHEN METABOLIC PANEL: CPT | Performed by: PHYSICIAN ASSISTANT

## 2023-09-20 PROCEDURE — 83605 ASSAY OF LACTIC ACID: CPT | Performed by: PHYSICIAN ASSISTANT

## 2023-09-20 PROCEDURE — 83735 ASSAY OF MAGNESIUM: CPT | Performed by: PHYSICIAN ASSISTANT

## 2023-09-20 PROCEDURE — 85025 COMPLETE CBC W/AUTO DIFF WBC: CPT | Performed by: PHYSICIAN ASSISTANT

## 2023-09-20 PROCEDURE — 84484 ASSAY OF TROPONIN QUANT: CPT | Performed by: PHYSICIAN ASSISTANT

## 2023-09-20 PROCEDURE — 99291 CRITICAL CARE FIRST HOUR: CPT | Performed by: PHYSICIAN ASSISTANT

## 2023-09-20 PROCEDURE — 93005 ELECTROCARDIOGRAM TRACING: CPT | Performed by: PHYSICIAN ASSISTANT

## 2023-09-20 PROCEDURE — 36592 COLLECT BLOOD FROM PICC: CPT | Performed by: PHYSICIAN ASSISTANT

## 2023-09-20 PROCEDURE — 82805 BLOOD GASES W/O2 SATURATION: CPT | Performed by: PHYSICIAN ASSISTANT

## 2023-09-20 PROCEDURE — 83880 ASSAY OF NATRIURETIC PEPTIDE: CPT | Performed by: PHYSICIAN ASSISTANT

## 2023-09-20 PROCEDURE — 96365 THER/PROPH/DIAG IV INF INIT: CPT | Mod: XU | Performed by: PHYSICIAN ASSISTANT

## 2023-09-20 PROCEDURE — 96367 TX/PROPH/DG ADDL SEQ IV INF: CPT | Performed by: PHYSICIAN ASSISTANT

## 2023-09-20 PROCEDURE — 87040 BLOOD CULTURE FOR BACTERIA: CPT | Mod: 91 | Performed by: PHYSICIAN ASSISTANT

## 2023-09-20 PROCEDURE — 71275 CT ANGIOGRAPHY CHEST: CPT

## 2023-09-20 PROCEDURE — 87637 SARSCOV2&INF A&B&RSV AMP PRB: CPT | Performed by: PHYSICIAN ASSISTANT

## 2023-09-20 PROCEDURE — 96375 TX/PRO/DX INJ NEW DRUG ADDON: CPT | Performed by: PHYSICIAN ASSISTANT

## 2023-09-20 PROCEDURE — 36415 COLL VENOUS BLD VENIPUNCTURE: CPT | Performed by: PHYSICIAN ASSISTANT

## 2023-09-20 PROCEDURE — 84145 PROCALCITONIN (PCT): CPT | Performed by: PHYSICIAN ASSISTANT

## 2023-09-20 PROCEDURE — 999N000157 HC STATISTIC RCP TIME EA 10 MIN

## 2023-09-20 PROCEDURE — 84443 ASSAY THYROID STIM HORMONE: CPT | Performed by: PHYSICIAN ASSISTANT

## 2023-09-20 RX ORDER — LIDOCAINE HYDROCHLORIDE 10 MG/ML
INJECTION, SOLUTION INFILTRATION; PERINEURAL PRN
Status: DISCONTINUED | OUTPATIENT
Start: 2023-09-20 | End: 2023-09-20

## 2023-09-20 RX ORDER — FUROSEMIDE 10 MG/ML
40 INJECTION INTRAMUSCULAR; INTRAVENOUS ONCE
Status: COMPLETED | OUTPATIENT
Start: 2023-09-20 | End: 2023-09-20

## 2023-09-20 RX ORDER — CEFTRIAXONE 2 G/1
2 INJECTION, POWDER, FOR SOLUTION INTRAMUSCULAR; INTRAVENOUS ONCE
Status: COMPLETED | OUTPATIENT
Start: 2023-09-20 | End: 2023-09-20

## 2023-09-20 RX ORDER — ALBUTEROL SULFATE 90 UG/1
2 AEROSOL, METERED RESPIRATORY (INHALATION) ONCE
Status: COMPLETED | OUTPATIENT
Start: 2023-09-20 | End: 2023-09-20

## 2023-09-20 RX ORDER — DEXAMETHASONE SODIUM PHOSPHATE 10 MG/ML
6 INJECTION, SOLUTION INTRAMUSCULAR; INTRAVENOUS ONCE
Status: COMPLETED | OUTPATIENT
Start: 2023-09-20 | End: 2023-09-20

## 2023-09-20 RX ORDER — MAGNESIUM SULFATE HEPTAHYDRATE 40 MG/ML
2 INJECTION, SOLUTION INTRAVENOUS ONCE
Status: COMPLETED | OUTPATIENT
Start: 2023-09-20 | End: 2023-09-20

## 2023-09-20 RX ORDER — ACETAMINOPHEN 325 MG/1
650 TABLET ORAL ONCE
Status: COMPLETED | OUTPATIENT
Start: 2023-09-20 | End: 2023-09-20

## 2023-09-20 RX ORDER — AZITHROMYCIN 500 MG/5ML
500 INJECTION, POWDER, LYOPHILIZED, FOR SOLUTION INTRAVENOUS ONCE
Status: COMPLETED | OUTPATIENT
Start: 2023-09-20 | End: 2023-09-20

## 2023-09-20 RX ORDER — ASPIRIN 81 MG/1
324 TABLET, CHEWABLE ORAL ONCE
Status: COMPLETED | OUTPATIENT
Start: 2023-09-20 | End: 2023-09-20

## 2023-09-20 RX ORDER — IOPAMIDOL 755 MG/ML
92 INJECTION, SOLUTION INTRAVASCULAR ONCE
Status: COMPLETED | OUTPATIENT
Start: 2023-09-20 | End: 2023-09-20

## 2023-09-20 RX ADMIN — IOPAMIDOL 92 ML: 755 INJECTION, SOLUTION INTRAVENOUS at 19:58

## 2023-09-20 RX ADMIN — DEXAMETHASONE SODIUM PHOSPHATE 6 MG: 10 INJECTION, SOLUTION INTRAMUSCULAR; INTRAVENOUS at 18:33

## 2023-09-20 RX ADMIN — ACETAMINOPHEN 650 MG: 325 TABLET, FILM COATED ORAL at 18:36

## 2023-09-20 RX ADMIN — ALBUTEROL SULFATE 2 PUFF: 90 AEROSOL, METERED RESPIRATORY (INHALATION) at 18:19

## 2023-09-20 RX ADMIN — MAGNESIUM SULFATE HEPTAHYDRATE 2 G: 40 INJECTION, SOLUTION INTRAVENOUS at 21:19

## 2023-09-20 RX ADMIN — CEFTRIAXONE 2 G: 2 INJECTION, POWDER, FOR SOLUTION INTRAMUSCULAR; INTRAVENOUS at 19:30

## 2023-09-20 RX ADMIN — FUROSEMIDE 40 MG: 10 INJECTION, SOLUTION INTRAMUSCULAR; INTRAVENOUS at 19:30

## 2023-09-20 RX ADMIN — AZITHROMYCIN MONOHYDRATE 500 MG: 500 INJECTION, POWDER, LYOPHILIZED, FOR SOLUTION INTRAVENOUS at 20:32

## 2023-09-20 RX ADMIN — LIDOCAINE HYDROCHLORIDE 0.5 ML: 10 INJECTION, SOLUTION INFILTRATION; PERINEURAL at 19:05

## 2023-09-20 RX ADMIN — ASPIRIN 81 MG CHEWABLE TABLET 324 MG: 81 TABLET CHEWABLE at 22:26

## 2023-09-20 ASSESSMENT — ACTIVITIES OF DAILY LIVING (ADL)
ADLS_ACUITY_SCORE: 35

## 2023-09-20 NOTE — ED TRIAGE NOTES
Patient states he has been SOB only on home o2 at night 2 Lpm. New cough and complaints of a cold. He presented on 6 lpm in triage sating 92%.     Triage Assessment       Row Name 09/20/23 8669       Triage Assessment (Adult)    Airway WDL WDL       Respiratory WDL    Respiratory WDL X;cough  sob       Skin Circulation/Temperature WDL    Skin Circulation/Temperature WDL WDL       Cardiac WDL    Cardiac WDL X;rhythm    Pulse Rate & Regularity tachycardic       Peripheral/Neurovascular WDL    Peripheral Neurovascular WDL WDL       Cognitive/Neuro/Behavioral WDL    Cognitive/Neuro/Behavioral WDL WDL

## 2023-09-21 VITALS
BODY MASS INDEX: 38.62 KG/M2 | RESPIRATION RATE: 10 BRPM | DIASTOLIC BLOOD PRESSURE: 111 MMHG | TEMPERATURE: 99 F | OXYGEN SATURATION: 97 % | WEIGHT: 254 LBS | HEART RATE: 67 BPM | SYSTOLIC BLOOD PRESSURE: 166 MMHG

## 2023-09-21 LAB
ATRIAL RATE - MUSE: 114 BPM
ATRIAL RATE - MUSE: 81 BPM
BASE EXCESS BLDV CALC-SCNC: 3.1 MMOL/L (ref -7.7–1.9)
DIASTOLIC BLOOD PRESSURE - MUSE: NORMAL MMHG
DIASTOLIC BLOOD PRESSURE - MUSE: NORMAL MMHG
ERYTHROCYTE [DISTWIDTH] IN BLOOD BY AUTOMATED COUNT: 13.8 % (ref 10–15)
HCO3 BLDV-SCNC: 29 MMOL/L (ref 21–28)
HCT VFR BLD AUTO: 41.9 % (ref 40–53)
HGB BLD-MCNC: 13.1 G/DL (ref 13.3–17.7)
INTERPRETATION ECG - MUSE: NORMAL
INTERPRETATION ECG - MUSE: NORMAL
MCH RBC QN AUTO: 28.4 PG (ref 26.5–33)
MCHC RBC AUTO-ENTMCNC: 31.3 G/DL (ref 31.5–36.5)
MCV RBC AUTO: 91 FL (ref 78–100)
O2/TOTAL GAS SETTING VFR VENT: 35 %
P AXIS - MUSE: 46 DEGREES
P AXIS - MUSE: NORMAL DEGREES
PCO2 BLDV: 47 MM HG (ref 40–50)
PH BLDV: 7.39 [PH] (ref 7.32–7.43)
PLATELET # BLD AUTO: 208 10E3/UL (ref 150–450)
PO2 BLDV: 67 MM HG (ref 25–47)
PR INTERVAL - MUSE: 184 MS
PR INTERVAL - MUSE: 196 MS
QRS DURATION - MUSE: 126 MS
QRS DURATION - MUSE: 130 MS
QT - MUSE: 352 MS
QT - MUSE: 416 MS
QTC - MUSE: 483 MS
QTC - MUSE: 485 MS
R AXIS - MUSE: 66 DEGREES
R AXIS - MUSE: 79 DEGREES
RBC # BLD AUTO: 4.62 10E6/UL (ref 4.4–5.9)
SYSTOLIC BLOOD PRESSURE - MUSE: NORMAL MMHG
SYSTOLIC BLOOD PRESSURE - MUSE: NORMAL MMHG
T AXIS - MUSE: 178 DEGREES
T AXIS - MUSE: 19 DEGREES
TROPONIN T SERPL HS-MCNC: 344 NG/L
TROPONIN T SERPL HS-MCNC: 480 NG/L
VENTRICULAR RATE- MUSE: 114 BPM
VENTRICULAR RATE- MUSE: 81 BPM
WBC # BLD AUTO: 11.3 10E3/UL (ref 4–11)

## 2023-09-21 PROCEDURE — 999N000157 HC STATISTIC RCP TIME EA 10 MIN

## 2023-09-21 PROCEDURE — 36415 COLL VENOUS BLD VENIPUNCTURE: CPT | Performed by: FAMILY MEDICINE

## 2023-09-21 PROCEDURE — 250N000013 HC RX MED GY IP 250 OP 250 PS 637: Performed by: FAMILY MEDICINE

## 2023-09-21 PROCEDURE — 82803 BLOOD GASES ANY COMBINATION: CPT | Performed by: FAMILY MEDICINE

## 2023-09-21 PROCEDURE — 96375 TX/PRO/DX INJ NEW DRUG ADDON: CPT | Performed by: PHYSICIAN ASSISTANT

## 2023-09-21 PROCEDURE — 250N000011 HC RX IP 250 OP 636: Mod: JZ | Performed by: FAMILY MEDICINE

## 2023-09-21 PROCEDURE — 84484 ASSAY OF TROPONIN QUANT: CPT | Performed by: FAMILY MEDICINE

## 2023-09-21 PROCEDURE — 85027 COMPLETE CBC AUTOMATED: CPT | Performed by: FAMILY MEDICINE

## 2023-09-21 RX ORDER — HEPARIN SODIUM 10000 [USP'U]/100ML
0-5000 INJECTION, SOLUTION INTRAVENOUS CONTINUOUS
Status: DISCONTINUED | OUTPATIENT
Start: 2023-09-21 | End: 2023-09-21 | Stop reason: HOSPADM

## 2023-09-21 RX ADMIN — TICAGRELOR 180 MG: 90 TABLET ORAL at 01:07

## 2023-09-21 RX ADMIN — HEPARIN SODIUM 1200 UNITS/HR: 10000 INJECTION, SOLUTION INTRAVENOUS at 01:00

## 2023-09-21 ASSESSMENT — ACTIVITIES OF DAILY LIVING (ADL): ADLS_ACUITY_SCORE: 35

## 2023-09-21 NOTE — ED PROVIDER NOTES
EMERGENCY DEPARTMENT ENCOUNTER      NAME: Jasson Dougherty  AGE: 83 year old male  YOB: 1940  MRN: [id number]  EVALUATION DATE & TIME: 9/20/2023  5:54 PM    PCP: No Ref-Primary, Physician    ED PROVIDER: Eliot Veliz PA-C       CHIEF COMPLAINT:  Chief Complaint   Patient presents with    Shortness of Breath         ED COURSE, MEDICAL DECISION MAKING, FINAL IMPRESSION AND PLAN:      The patient was interviewed and examined.  HPI and physical exam as below.  Differential diagnosis and MDM Key Documentation Elements as below.  Vitals and triage note were reviewed.  BP (!) 171/73   Pulse 81   Temp 99  F (37.2  C)   Resp 23   Wt 115.2 kg (254 lb)   SpO2 95%   BMI 38.62 kg/m      Jasson Dougherty is a pleasant 83 year old male with history of coronary artery disease, peripheral arterial disease, type 2 diabetes, rectal cancer, obstructive sleep apnea, type 2 diabetes, hypertension, hyperlipidemia, right carotid artery stenosis, CVA, and MI who presents to the ER today with his wife for evaluation of shortness of breath.  Patient states that symptoms began 2 days ago.  Patient having shortness of breath, fever, and cough.  Symptoms worsened over the past 2 days.  Patient feeling extremely short of breath today.  Denying any chest pain, fever, or chills.  No back pain or shortness of breath.  No congestion or rhinorrhea.  Does endorse some leg swelling but no leg pain.    Differential includes but is not limited to ACS/MI, unstable angina, pneumonia, pneumothorax, congestive heart failure    Today was afebrile but tachycardic and hypoxic.  Patient was in respiratory distress upon initial presentation.  Patient speaking in short sentences.  Patient had left-sided rhonchi on physical exam.  Bilateral edema of the lower extremities which are nontender.  Heart was tachycardic otherwise without unremarkable.  Exam otherwise benign.    Patient was initially placed on 2 L nasal cannula.  Patient  then was placed on 6 L nasal cannula.  Respiratory therapy then switch patient from nasal cannula to CPAP.  During CT imaging of the chest, patient was placed onto BiPAP.  After being placed on BiPAP, patient felt sniffily better and was breathing a lot easier.    Patient was given IV Decadron 10 mg and COVID Hailer pending COVID results.    Laboratory studies showed no leukocytosis but patient did have left shift.  Normal lactic acid.  D-dimer was elevated at 0.87.  Screening BNP was elevated at 364.  Initial EKG with right bundle branch block otherwise normal-appearing with no ST segment deviation to suggest ACS/MI.  Initial screening opponent was elevated at 84.  Repeat troponin at the 2-hour carlitos was 163, more than 7 points of elevation needed for possible NSTEMI.  Venous blood gas today showed venous pH 7.33, CO2 56, O2 39, and bicarb 29.  Patient iwith normal and renal function.  Magnesium 1.6.  Procalcitonin 0.06.  TSH 1.77.  Chest x-ray revealed cardiomegaly with pulmonary edema which did show interval worsening from prior study.  I did obtain a CT chest pulmonary embolism with contrast given elevated D-dimer.  CT concerning for left-sided lung pneumonia as well as pulmonary edema.  Patient also had an incidental left renal cyst.    Patient started on broad-spectrum IV antibiotics to include IV Rocephin 2 g and IV Zithromax 500 mg.  Patient was started on IV Lasix 40 mg for concerns of congestive heart failure due to elevated BNP, pulmonary edema, cardiomegaly.  Patient started on aspirin 324 mg for possible NSTEMI.  Patient given IV magnesium 2 g for replenishment for hypomagnesemia of 1.6.    I discussed patient with [facility], Dr. Barker, about transfer patient to [facility] for further evaluation given trending up of troponin.  Dr. Barker cannot accept patient.  Patient remained stable during his course of stay here in the ER.  Patient did not require intubation.  Patient was not septic and did not require  pressors.    Assessment / Plan:  1. Community acquired pneumonia    2. Acute respiratory failure with hypercapnia (H)    3. Hypomagnesemia    4. Renal cyst, left    5. Cardiomegaly    6. CHF (congestive heart failure) (H)      1.  Left-sided pneumonia with acute respiratory failure  -IV Rocephin 2 g and IV Zithromax 500 mg for antibiotic coverage  -IV Decadron 6 mg, oral albuterol inhaler, and BiPAP for breathing  -Patient to be transferred to [facility] for further cares.    2.  Congestive heart failure exacerbation and cardiomegaly  -Last echocardiogram was performed on 11/30/2022 with ejection fraction of 55-60%.  Possible left ventricular diastolic dysfunction, indeterminate, and increased left ventricular filling pressures  -Patient given IV Lasix 40 mg  -On BiPAP  -Transfer to [facility]    3.  Hypomagnesemia  -IV magnesium 2 g given for replenishment    4.  Left renal cyst  -New incidental finding.  Will need further work-up      Pertinent Labs & Imaging studies reviewed. (See chart for details)  Results for orders placed or performed during the hospital encounter of 09/20/23   XR Chest Port 1 View    Impression    IMPRESSION: Cardiomegaly with pulmonary edema. Interval worsening from  the prior study.    DARLENE FORD MD         SYSTEM ID:  [id number]   CT Chest Pulmonary Embolism w Contrast    Impression    IMPRESSION: Airspace opacities suggestive of pneumonia in the left  lung as well as pulmonary edema.    No evidence of pulmonary embolism.    Potential mildly to moderately complex left renal cyst. Consider  follow-up    DARLENE FORD MD         SYSTEM ID:  [id number]   Symptomatic Influenza A/B, RSV, & SARS-CoV2 PCR (COVID-19) Nose    Specimen: Nose; Swab   Result Value Ref Range    Influenza A PCR Negative Negative    Influenza B PCR Negative Negative    RSV PCR Negative Negative    SARS CoV2 PCR Negative Negative   Lactic Acid STAT   Result Value Ref Range    Lactic Acid 1.2 0.7 - 2.0 mmol/L   D  dimer quantitative   Result Value Ref Range    D-Dimer Quantitative 0.87 (H) 0.00 - 0.50 ug/mL FEU   Comprehensive metabolic panel   Result Value Ref Range    Sodium 141 136 - 145 mmol/L    Potassium 4.2 3.4 - 5.3 mmol/L    Chloride 102 98 - 107 mmol/L    Carbon Dioxide (CO2) 26 22 - 29 mmol/L    Anion Gap 13 7 - 15 mmol/L    Urea Nitrogen 18.1 8.0 - 23.0 mg/dL    Creatinine 1.12 0.67 - 1.17 mg/dL    Calcium 9.1 8.8 - 10.2 mg/dL    Glucose 97 70 - 99 mg/dL    Alkaline Phosphatase 77 40 - 129 U/L    AST 17 0 - 45 U/L    ALT 11 0 - 70 U/L    Protein Total 7.0 6.4 - 8.3 g/dL    Albumin 4.1 3.5 - 5.2 g/dL    Bilirubin Total 0.3 <=1.2 mg/dL    GFR Estimate 64 >60 mL/min/1.73m2   Result Value Ref Range    Procalcitonin 0.06 (H) <0.05 ng/mL   Result Value Ref Range    Troponin T, High Sensitivity 84 (H) <=22 ng/L   Result Value Ref Range    Magnesium 1.6 (L) 1.7 - 2.3 mg/dL   TSH Reflex GH   Result Value Ref Range    TSH 1.77 0.30 - 4.20 uIU/mL   Blood gas venous and oxyhgb   Result Value Ref Range    pH Venous 7.33 7.32 - 7.43    pCO2 Venous 56 (H) 40 - 50 mm Hg    pO2 Venous 39 25 - 47 mm Hg    Bicarbonate Venous 29 (H) 21 - 28 mmol/L    FIO2 29     Oxyhemoglobin Venous 69 (L) 70 - 75 %    Base Excess/Deficit 1.8 -7.7 - 1.9 mmol/L   Nt probnp inpatient (BNP)   Result Value Ref Range    N terminal Pro BNP Inpatient 3,642 (H) 0 - 1,800 pg/mL   CBC with platelets and differential   Result Value Ref Range    WBC Count 10.0 4.0 - 11.0 10e3/uL    RBC Count 4.92 4.40 - 5.90 10e6/uL    Hemoglobin 13.9 13.3 - 17.7 g/dL    Hematocrit 45.5 40.0 - 53.0 %    MCV 93 78 - 100 fL    MCH 28.3 26.5 - 33.0 pg    MCHC 30.5 (L) 31.5 - 36.5 g/dL    RDW 13.8 10.0 - 15.0 %    Platelet Count 227 150 - 450 10e3/uL    % Neutrophils 86 %    % Lymphocytes 6 %    % Monocytes 7 %    % Eosinophils 1 %    % Basophils 0 %    % Immature Granulocytes 0 %    NRBCs per 100 WBC 0 <1 /100    Absolute Neutrophils 8.5 (H) 1.6 - 8.3 10e3/uL    Absolute  Lymphocytes 0.6 (L) 0.8 - 5.3 10e3/uL    Absolute Monocytes 0.7 0.0 - 1.3 10e3/uL    Absolute Eosinophils 0.1 0.0 - 0.7 10e3/uL    Absolute Basophils 0.0 0.0 - 0.2 10e3/uL    Absolute Immature Granulocytes 0.0 <=0.4 10e3/uL    Absolute NRBCs 0.0 10e3/uL   Extra Red Top Tube   Result Value Ref Range    Hold Specimen JIC    Result Value Ref Range    Troponin T, High Sensitivity 163 (HH) <=22 ng/L   EKG 12-lead, tracing only   Result Value Ref Range    Systolic Blood Pressure  mmHg    Diastolic Blood Pressure  mmHg    Ventricular Rate 81 BPM    Atrial Rate 81 BPM    MT Interval 196 ms    QRS Duration 130 ms     ms    QTc 483 ms    P Axis 46 degrees    R AXIS 66 degrees    T Axis 19 degrees    Interpretation ECG       Sinus rhythm  Right bundle branch block  Abnormal ECG  When compared with ECG of 20-SEP-2023 18:08, (unconfirmed)  Criteria for Lateral infarct are no longer Present  T wave inversion no longer evident in Inferior leads  T wave inversion no longer evident in Lateral leads       No results found for: ABORH    Medications given during today's ER visit:  Medications   dexAMETHasone PF (DECADRON) injection 6 mg (6 mg Intravenous $Given 9/20/23 1833)   albuterol (PROVENTIL HFA/VENTOLIN HFA) inhaler (2 puffs Inhalation $Given 9/20/23 1819)   acetaminophen (TYLENOL) tablet 650 mg (650 mg Oral $Given 9/20/23 1836)   cefTRIAXone (ROCEPHIN) 2 g vial to attach to  ml bag for ADULTS or NS 50 ml bag for PEDS (0 g Intravenous Stopped 9/20/23 2030)   azithromycin 500 mg (ZITHROMAX) in 0.9% NaCl 250 mL intermittent infusion 500 mg (0 mg Intravenous Stopped 9/20/23 2137)   furosemide (LASIX) injection 40 mg (40 mg Intravenous $Given 9/20/23 1930)   iopamidol (ISOVUE-370) solution 92 mL (92 mLs Intravenous $Given 9/20/23 1958)   magnesium sulfate 2 g in 50 mL sterile water intermittent infusion (0 g Intravenous Stopped 9/20/23 2213)   aspirin (ASA) chewable tablet 324 mg (324 mg Oral $Given 9/20/23 2226)        New prescriptions started at today's ER visit  New Prescriptions    No medications on file       Reassessments, Medications, Interventions, & Response to Treatments:  Multiple reassessments.  Patient initially placed on 2 L of nasal cannula, 6 L nasal cannula, CPAP, and then BiPAP.  Patient with improved breathing on IV Decadron, albuterol inhaler, CPAP and BiPAP.  Discussed laboratory and imaging results.  Discussed treatment plan of follow-up    Consultations:  Dr. Barker-ED attending physician/intake provider at [facility].    Decision Rules, Medical Calculators, and Risk Stratification Tools:  None    MDM Key Documentation Elements for Patient's Evaluation:  Differential diagnosis to include high risk considerations: As above  Escalation to admission/observation considered: Admission/observation considered, patient was admitted in stable condition  Discussions and management with other clinicians:    3a. Independent interpretation of testing performed by another health professional:  -No  3b. Discussion of management or test interpretation with another health professional: -Yes  Independent interpretation of tests:  Ordering and/or review of 3+ test(s)  Discussion of test interpretations with radiology:  No  History obtained from source other than patient or assessment requiring an independent historian:  No  Review of non-ED/external records:  review of 3+ records  Diagnostic tests considered but not ultimately performed/deferred:  -Echocardiogram  Prescription medications considered but not prescribed:  - Patient was admitted  Chronic conditions affecting care:  -Obstructive sleep apnea, coronary artery disease  Care affected by social determinants of health:  -None    The patient's management involved:   - Laboratory studies  - Imaging studies  - Parenteral controlled substance  - Decision regarding hospitalization    CRITICAL CARE TIME   Performed by: DANISH JJ  Total critical care time:  34 minutes  Critical care time was exclusive of separately billable procedures and treating other patients.  Critical care was necessary to treat or prevent imminent or life-threatening deterioration of the following conditions: Left-sided pneumonia with acute respiratory failure  Critical care was time spent personally by me on the following activities: development of treatment plan with patient or surrogate, discussions with consultants, examination of patient, evaluation of patient's response to treatment, obtaining history from patient or surrogate, ordering and performing treatments and interventions, ordering and review of laboratory studies, ordering and review of radiographic studies and re-evaluation of patient's condition, this excludes any separately billable procedures.     A shared decision making model was used. Time was taken to answer all questions.  Patient and/or associated parties understood and were agreeable to treatment plan.  Plan and all results were discussed.  Patient was admitted in stable condition.  Patient will go by ground [facility].  Signed EMTALA obtained.      PPE worn during patient evaluation:  Mask: Yes  Eye Protection: No  Gown: No  Hair cover: No  Face Shield: No  Patient wearing a mask: No      MEDICATIONS GIVEN IN THE EMERGENCY:  Medications   dexAMETHasone PF (DECADRON) injection 6 mg (6 mg Intravenous $Given 9/20/23 1833)   albuterol (PROVENTIL HFA/VENTOLIN HFA) inhaler (2 puffs Inhalation $Given 9/20/23 1819)   acetaminophen (TYLENOL) tablet 650 mg (650 mg Oral $Given 9/20/23 1836)   cefTRIAXone (ROCEPHIN) 2 g vial to attach to  ml bag for ADULTS or NS 50 ml bag for PEDS (0 g Intravenous Stopped 9/20/23 2030)   azithromycin 500 mg (ZITHROMAX) in 0.9% NaCl 250 mL intermittent infusion 500 mg (0 mg Intravenous Stopped 9/20/23 2137)   furosemide (LASIX) injection 40 mg (40 mg Intravenous $Given 9/20/23 1930)   iopamidol (ISOVUE-370) solution 92 mL (92 mLs Intravenous $Given  9/20/23 1958)   magnesium sulfate 2 g in 50 mL sterile water intermittent infusion (0 g Intravenous Stopped 9/20/23 2213)   aspirin (ASA) chewable tablet 324 mg (324 mg Oral $Given 9/20/23 2226)       NEW PRESCRIPTIONS STARTED AT TODAY'S ER VISIT:  New Prescriptions    No medications on file          =================================================================    HPI  Jasson Dougherty is a pleasant 83 year old male with history of coronary artery disease, peripheral arterial disease, type 2 diabetes, rectal cancer, obstructive sleep apnea, type 2 diabetes, hypertension, hyperlipidemia, right carotid artery stenosis, CVA, and MI who presents to the ER today with his wife for evaluation of shortness of breath.  Patient states that symptoms began 2 days ago.  Patient having shortness of breath, fever, and cough.  Symptoms worsened over the past 2 days.  Patient feeling extremely short of breath today.  Denying any chest pain, fever, or chills.  No back pain or shortness of breath.  No congestion or rhinorrhea.  Does endorse some leg swelling but no leg pain.      REVIEW OF SYSTEMS   Review of Systems  As above, otherwise ROS is unremarkable.      PAST MEDICAL HISTORY:  History reviewed. No pertinent past medical history.    PAST SURGICAL HISTORY:  Past Surgical History:   Procedure Laterality Date    APPENDECTOMY OPEN      No Comments Provided    BACK SURGERY      fusion    COLONOSCOPY      2015    OTHER SURGICAL HISTORY      SKC950,HEMICOLECTOMY    OTHER SURGICAL HISTORY      SUR85,ANKLE ARTHROSCOPY           CURRENT MEDICATIONS:    Current Outpatient Medications   Medication Instructions    acetaminophen (TYLENOL) 1,000 mg, Oral, 2 TIMES DAILY    amLODIPine (NORVASC) 10 mg, Oral, DAILY    aspirin 81 mg, Oral, DAILY WITH FOOD    blood glucose (NO BRAND SPECIFIED) test strip check sugars once daily before meals    Blood Glucose Monitoring Suppl (FIFTY50 GLUCOSE METER 2.0) W/DEVICE KIT No dose, route, or frequency  recorded.    Blood Glucose Monitoring Suppl (ONE TOUCH BASIC SYSTEM) W/DEVICE KIT Does not apply    cyanocobalamin (CYANOCOBALAMIN) 1000 MCG/ML injection 1 mL, Intramuscular, EVERY 30 DAYS    gabapentin (NEURONTIN) 900 mg, Oral, 2 TIMES DAILY    hydrochlorothiazide (HYDRODIURIL) 25 mg, Oral, DAILY    insulin aspart (NOVOLOG PEN) 20 Units, Subcutaneous, 3 TIMES DAILY WITH MEALS, Give within 15 minutes before or after meals/snacks.    insulin glargine (LANTUS PEN) 40 Units, Subcutaneous, 2 TIMES DAILY    insulin pen needle 32G X 8  each, Does not apply    lancets 28G MISC No dose, route, or frequency recorded.    lisinopril (ZESTRIL) 40 mg, Oral, DAILY    metoprolol tartrate (LOPRESSOR) 25 mg, Oral, 2 TIMES DAILY    nitroGLYcerin (NITROSTAT) 0.4 mg, Sublingual, EVERY 5 MIN PRN, For chest pain place 1 tablet under the tongue every 5 minutes for 3 doses. If symptoms persist 5 minutes after 1st dose call 911.    omeprazole 20 mg, Oral, DAILY    ONE TOUCH LANCETS MISC Does not apply    pravastatin (PRAVACHOL) 40 mg, Oral, DAILY    tamsulosin (FLOMAX) 0.4 mg, Oral, DAILY    Vitamin D3 (CHOLECALCIFEROL) 4,000 Units, Oral, DAILY       ALLERGIES:  Allergies   Allergen Reactions    Penicillins Rash and Unknown       FAMILY HISTORY:  No family history on file.    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       PHYSICAL EXAM    VITAL SIGNS: BP (!) 171/73   Pulse 81   Temp 99  F (37.2  C)   Resp 23   Wt 115.2  kg (254 lb)   SpO2 95%   BMI 38.62 kg/m      Patient Vitals for the past 24 hrs:   BP Temp Pulse Resp SpO2 Weight   09/20/23 2315 (!) 171/73 -- 81 23 95 % --   09/20/23 2300 (!) 176/75 -- 71 27 94 % --   09/20/23 2245 (!) 172/76 -- 72 28 95 % --   09/20/23 2230 (!) 179/76 -- 80 19 95 % --   09/20/23 2215 (!) 176/72 -- 75 28 95 % --   09/20/23 2200 (!) 177/73 -- 73 25 94 % --   09/20/23 2145 (!) 175/81 -- 81 30 95 % --   09/20/23 2130 (!) 165/78 -- 80 27 95 % --   09/20/23 2115 (!) 173/81 -- 82 27 94 % --   09/20/23 2100 (!) 177/81 -- 82 28 94 % --   09/20/23 2045 (!) 169/79 -- 85 25 95 % --   09/20/23 2030 (!) 172/82 99  F (37.2  C) 86 26 92 % --   09/20/23 2015 (!) 191/85 -- 94 24 -- --   09/20/23 1930 (!) 182/88 -- 98 (!) 36 96 % --   09/20/23 1915 (!) 207/110 -- (!) 121 26 96 % --   09/20/23 1900 (!) 159/124 (!) 101  F (38.3  C) (!) 125 (!) 35 91 % --   09/20/23 1845 (!) 221/107 -- (!) 132 (!) 39 90 % --   09/20/23 1842 -- -- -- -- 92 % --   09/20/23 1840 -- -- (!) 125 30 (!) 88 % --   09/20/23 1836 -- (!) 100.5  F (38.1  C) -- -- -- --   09/20/23 1835 -- -- (!) 122 21 (!) 88 % --   09/20/23 1830 (!) 188/133 -- (!) 122 29 (!) 88 % --   09/20/23 1825 -- -- (!) 130 24 90 % --   09/20/23 1820 (!) 153/139 -- 114 26 91 % --   09/20/23 1819 -- -- -- -- 92 % --   09/20/23 1815 -- -- 110 (!) 32 91 % --   09/20/23 1810 -- -- 115 23 (!) 89 % --   09/20/23 1805 -- -- 112 (!) 44 90 % --   09/20/23 1800 -- -- 111 (!) 39 93 % --   09/20/23 1756 -- 100.3  F (37.9  C) -- -- -- --   09/20/23 1755 (!) 123/101 -- 110 26 94 % 115.2 kg (254 lb)       Physical Exam  Vitals and nursing note reviewed.   Constitutional:       General: He is in acute distress.      Appearance: Normal appearance. He is not ill-appearing or diaphoretic.   HENT:      Right Ear: Tympanic membrane, ear canal and external ear normal.      Left Ear: Tympanic membrane, ear canal and external ear normal.      Nose: Nose normal.      Mouth/Throat:      Mouth:  Mucous membranes are moist.      Pharynx: Oropharynx is clear.   Eyes:      Conjunctiva/sclera: Conjunctivae normal.   Cardiovascular:      Rate and Rhythm: Regular rhythm. Tachycardia present.   Pulmonary:      Effort: Respiratory distress present.      Breath sounds: Rhonchi (Left-sided) present. No wheezing or rales.      Comments: Tachypnea  Chest:      Chest wall: No tenderness.   Abdominal:      General: Abdomen is flat. Bowel sounds are normal.      Palpations: Abdomen is soft.      Tenderness: There is no abdominal tenderness.   Musculoskeletal:         General: Normal range of motion.      Cervical back: Normal range of motion and neck supple.      Right lower leg: Edema present.      Left lower leg: Edema present.   Skin:     General: Skin is warm and dry.      Capillary Refill: Capillary refill takes less than 2 seconds.   Neurological:      General: No focal deficit present.      Mental Status: He is alert.   Psychiatric:         Mood and Affect: Mood normal.          LABS & RADIOLOGY:  All pertinent labs reviewed and interpreted. Reviewed all pertinent imaging. Please see official radiology report.  Results for orders placed or performed during the hospital encounter of 09/20/23   XR Chest Port 1 View    Impression    IMPRESSION: Cardiomegaly with pulmonary edema. Interval worsening from  the prior study.    DARLENE FORD MD         SYSTEM ID:  [id number]   CT Chest Pulmonary Embolism w Contrast    Impression    IMPRESSION: Airspace opacities suggestive of pneumonia in the left  lung as well as pulmonary edema.    No evidence of pulmonary embolism.    Potential mildly to moderately complex left renal cyst. Consider  follow-up    DARLENE FORD MD         SYSTEM ID:  [id number]   Symptomatic Influenza A/B, RSV, & SARS-CoV2 PCR (COVID-19) Nose    Specimen: Nose; Swab   Result Value Ref Range    Influenza A PCR Negative Negative    Influenza B PCR Negative Negative    RSV PCR Negative Negative    SARS  CoV2 PCR Negative Negative   Lactic Acid STAT   Result Value Ref Range    Lactic Acid 1.2 0.7 - 2.0 mmol/L   D dimer quantitative   Result Value Ref Range    D-Dimer Quantitative 0.87 (H) 0.00 - 0.50 ug/mL FEU   Comprehensive metabolic panel   Result Value Ref Range    Sodium 141 136 - 145 mmol/L    Potassium 4.2 3.4 - 5.3 mmol/L    Chloride 102 98 - 107 mmol/L    Carbon Dioxide (CO2) 26 22 - 29 mmol/L    Anion Gap 13 7 - 15 mmol/L    Urea Nitrogen 18.1 8.0 - 23.0 mg/dL    Creatinine 1.12 0.67 - 1.17 mg/dL    Calcium 9.1 8.8 - 10.2 mg/dL    Glucose 97 70 - 99 mg/dL    Alkaline Phosphatase 77 40 - 129 U/L    AST 17 0 - 45 U/L    ALT 11 0 - 70 U/L    Protein Total 7.0 6.4 - 8.3 g/dL    Albumin 4.1 3.5 - 5.2 g/dL    Bilirubin Total 0.3 <=1.2 mg/dL    GFR Estimate 64 >60 mL/min/1.73m2   Result Value Ref Range    Procalcitonin 0.06 (H) <0.05 ng/mL   Result Value Ref Range    Troponin T, High Sensitivity 84 (H) <=22 ng/L   Result Value Ref Range    Magnesium 1.6 (L) 1.7 - 2.3 mg/dL   TSH Reflex GH   Result Value Ref Range    TSH 1.77 0.30 - 4.20 uIU/mL   Blood gas venous and oxyhgb   Result Value Ref Range    pH Venous 7.33 7.32 - 7.43    pCO2 Venous 56 (H) 40 - 50 mm Hg    pO2 Venous 39 25 - 47 mm Hg    Bicarbonate Venous 29 (H) 21 - 28 mmol/L    FIO2 29     Oxyhemoglobin Venous 69 (L) 70 - 75 %    Base Excess/Deficit 1.8 -7.7 - 1.9 mmol/L   Nt probnp inpatient (BNP)   Result Value Ref Range    N terminal Pro BNP Inpatient 3,642 (H) 0 - 1,800 pg/mL   CBC with platelets and differential   Result Value Ref Range    WBC Count 10.0 4.0 - 11.0 10e3/uL    RBC Count 4.92 4.40 - 5.90 10e6/uL    Hemoglobin 13.9 13.3 - 17.7 g/dL    Hematocrit 45.5 40.0 - 53.0 %    MCV 93 78 - 100 fL    MCH 28.3 26.5 - 33.0 pg    MCHC 30.5 (L) 31.5 - 36.5 g/dL    RDW 13.8 10.0 - 15.0 %    Platelet Count 227 150 - 450 10e3/uL    % Neutrophils 86 %    % Lymphocytes 6 %    % Monocytes 7 %    % Eosinophils 1 %    % Basophils 0 %    % Immature  Granulocytes 0 %    NRBCs per 100 WBC 0 <1 /100    Absolute Neutrophils 8.5 (H) 1.6 - 8.3 10e3/uL    Absolute Lymphocytes 0.6 (L) 0.8 - 5.3 10e3/uL    Absolute Monocytes 0.7 0.0 - 1.3 10e3/uL    Absolute Eosinophils 0.1 0.0 - 0.7 10e3/uL    Absolute Basophils 0.0 0.0 - 0.2 10e3/uL    Absolute Immature Granulocytes 0.0 <=0.4 10e3/uL    Absolute NRBCs 0.0 10e3/uL   Extra Red Top Tube   Result Value Ref Range    Hold Specimen JIC    Result Value Ref Range    Troponin T, High Sensitivity 163 (HH) <=22 ng/L   EKG 12-lead, tracing only   Result Value Ref Range    Systolic Blood Pressure  mmHg    Diastolic Blood Pressure  mmHg    Ventricular Rate 81 BPM    Atrial Rate 81 BPM    MA Interval 196 ms    QRS Duration 130 ms     ms    QTc 483 ms    P Axis 46 degrees    R AXIS 66 degrees    T Axis 19 degrees    Interpretation ECG       Sinus rhythm  Right bundle branch block  Abnormal ECG  When compared with ECG of 20-SEP-2023 18:08, (unconfirmed)  Criteria for Lateral infarct are no longer Present  T wave inversion no longer evident in Inferior leads  T wave inversion no longer evident in Lateral leads       CT Chest Pulmonary Embolism w Contrast   Final Result   IMPRESSION: Airspace opacities suggestive of pneumonia in the left   lung as well as pulmonary edema.      No evidence of pulmonary embolism.      Potential mildly to moderately complex left renal cyst. Consider   follow-up      DARLENE FORD MD            SYSTEM ID:  [id number]      XR Chest Port 1 View   Final Result   IMPRESSION: Cardiomegaly with pulmonary edema. Interval worsening from   the prior study.      DARLENE FORD MD            SYSTEM ID:  [id number]          [id number] EKG available for comparison. EKG reviewed at 1808.  1) Rhythm: Sinus tachycardia  2) Rate: ventricular rate 114 bpm.  3) QRS Axis: No axis deviation  4) P waves/ MA interval: Sinus. Nml KATY. No atrial enlargement  5) QRS complex: Narrow.  Right BBB. No ventricular  hypertrophy. No pathological Q waves.  6) ST Segment: No acute ST segment elevation or depression  7) T waves: No T wave inversions. No peaked or flattened T waves.     I have independently reviewed and interpreted today's EKG, pending cardiologist over read.       9/20/2023 EKG available for comparison. EKG reviewed at 2130.  1) Rhythm: NSR  2) Rate: ventricular rate 81 bpm.  3) QRS Axis: No axis deviation  4) P waves/ VA interval: Sinus. Nml KATY. No atrial enlargement  5) QRS complex: Narrow. Right BBB. No ventricular hypertrophy. No pathological Q waves.  6) ST Segment: No acute ST segment elevation or depression  7) T waves: No T wave inversions. No peaked or flattened T waves.     I have independently reviewed and interpreted today's EKG, pending cardiologist over read.           IAnand PA-C, personally performed the services described in this documentation, and it is both accurate and complete.       Eliot Veliz PA-C  09/20/23 [id number]

## 2023-09-21 NOTE — ED NOTES
Pt complaining of severe shortness of breath and air hunger, sats above 90 percent. Placed on cpap by RT

## 2023-09-21 NOTE — PROGRESS NOTES
Shift Summary    Called to bedside at 1800. Pt on 2 LPM Oxygen initially. Pt reports they use 02 at night, but not normally during the day. Patient SOB on assessment. Albuterol MDI given. Breath sounds diminished and coarse. CPAP +10 applied and 45%. No further interventions at this time.    Isiah Clayton, RT

## 2023-09-21 NOTE — ED PROVIDER NOTES
09/21/23   12:33 AM     I am accepting the care of this patient.  Anand Veliz had the patient set up for discharge and I did not sign up at change of shift.  The patient has been here now about 1 1/2 hours and a repeat troponin came back elevated so I am going to sign up to make some changes to his management plan.       ED Course    His repeat troponin is 344, more than double the prior.  I have ordered heparin, Brilinta and a repeat VBG.      2:11 AM  They are here for transfer.     Diagnosis    (I21.4) NSTEMI (non-ST elevated myocardial infarction) (H)  (primary encounter diagnosis)    (J18.9) Community acquired pneumonia    (J96.02) Acute respiratory failure with hypercapnia (H)    (E83.42) Hypomagnesemia    (N28.1) Renal cyst, left    (I51.7) Cardiomegaly    (I50.9) CHF (congestive heart failure) (H)          Plan: transfer to [facility], Jose Suarez MD  09/21/23 0219

## 2023-09-21 NOTE — PROGRESS NOTES
1.  Has the patient had a previous reaction to IV contrast? No    2.  Does the patient have kidney disease? No    3.  Is the patient on dialysis? No    If YES to any of these questions, exam will be reviewed with a Radiologist before administering contrast.    IV Contrast- Discharge Instructions After Your CT Scan      The IV contrast you received today will be filtered from your bloodstream by your kidneys during the next 24 hours and pass from the body in urine.  You will not be aware of this process and your urine will not change in color.  To help this process you should drink at least 4 additional glasses of water or juice today.  This reduces stress on your kidneys.    Most contrast reactions are immediate.  Should you develop symptoms of concern after discharge, contact the department at the number below.  After hours you should contact your personal physician.  If you develop breathing distress or wheezing, call 911.    Mohsen Ziegler on 9/20/2023 at 7:42 PM

## 2023-09-26 LAB
BACTERIA BLD CULT: NO GROWTH
BACTERIA BLD CULT: NO GROWTH

## 2024-02-24 ENCOUNTER — HEALTH MAINTENANCE LETTER (OUTPATIENT)
Age: 84
End: 2024-02-24

## 2024-04-18 ENCOUNTER — HOSPITAL ENCOUNTER (OUTPATIENT)
Dept: ULTRASOUND IMAGING | Facility: OTHER | Age: 84
Discharge: HOME | End: 2024-04-18
Attending: NURSE PRACTITIONER | Admitting: NURSE PRACTITIONER
Payer: COMMERCIAL

## 2024-04-18 DIAGNOSIS — Z01.89 ENCOUNTER FOR OTHER SPECIFIED SPECIAL EXAMINATIONS: ICD-10-CM

## 2024-04-18 PROCEDURE — 76770 US EXAM ABDO BACK WALL COMP: CPT

## 2024-09-14 ENCOUNTER — HOSPITAL ENCOUNTER (OUTPATIENT)
Facility: OTHER | Age: 84
Setting detail: OBSERVATION
Discharge: HOME | End: 2024-09-15
Attending: FAMILY MEDICINE | Admitting: INTERNAL MEDICINE
Payer: COMMERCIAL

## 2024-09-14 ENCOUNTER — APPOINTMENT (OUTPATIENT)
Dept: GENERAL RADIOLOGY | Facility: OTHER | Age: 84
End: 2024-09-14
Attending: FAMILY MEDICINE
Payer: COMMERCIAL

## 2024-09-14 DIAGNOSIS — U07.1 COVID-19: ICD-10-CM

## 2024-09-14 DIAGNOSIS — R53.1 GENERAL WEAKNESS: ICD-10-CM

## 2024-09-14 PROBLEM — I50.32 CHRONIC DIASTOLIC HEART FAILURE (H): Status: ACTIVE | Noted: 2024-08-01

## 2024-09-14 PROBLEM — R50.81 FEVER IN OTHER DISEASES: Status: RESOLVED | Noted: 2022-11-30 | Resolved: 2024-09-14

## 2024-09-14 PROBLEM — R09.02 HYPOXIA: Status: RESOLVED | Noted: 2022-11-30 | Resolved: 2024-09-14

## 2024-09-14 PROBLEM — M25.512 CHRONIC LEFT SHOULDER PAIN: Status: ACTIVE | Noted: 2021-06-15

## 2024-09-14 PROBLEM — I73.9 PAD (PERIPHERAL ARTERY DISEASE) (H): Status: ACTIVE | Noted: 2023-02-14

## 2024-09-14 PROBLEM — G89.29 CHRONIC LEFT SHOULDER PAIN: Status: ACTIVE | Noted: 2021-06-15

## 2024-09-14 PROBLEM — J44.9 CHRONIC OBSTRUCTIVE PULMONARY DISEASE, UNSPECIFIED (H): Status: ACTIVE | Noted: 2024-08-01

## 2024-09-14 LAB
ALBUMIN UR-MCNC: 50 MG/DL
ANION GAP SERPL CALCULATED.3IONS-SCNC: 14 MMOL/L (ref 7–15)
APPEARANCE UR: CLEAR
BASOPHILS # BLD AUTO: 0 10E3/UL (ref 0–0.2)
BASOPHILS NFR BLD AUTO: 1 %
BILIRUB UR QL STRIP: NEGATIVE
BUN SERPL-MCNC: 40.8 MG/DL (ref 8–23)
CALCIUM SERPL-MCNC: 8.3 MG/DL (ref 8.8–10.4)
CHLORIDE SERPL-SCNC: 101 MMOL/L (ref 98–107)
COLOR UR AUTO: ABNORMAL
CREAT SERPL-MCNC: 2.32 MG/DL (ref 0.67–1.17)
CRP SERPL-MCNC: 101.03 MG/L
EGFRCR SERPLBLD CKD-EPI 2021: 27 ML/MIN/1.73M2
EOSINOPHIL # BLD AUTO: 0.1 10E3/UL (ref 0–0.7)
EOSINOPHIL NFR BLD AUTO: 1 %
ERYTHROCYTE [DISTWIDTH] IN BLOOD BY AUTOMATED COUNT: 13.7 % (ref 10–15)
GLUCOSE BLDC GLUCOMTR-MCNC: 123 MG/DL (ref 70–99)
GLUCOSE BLDC GLUCOMTR-MCNC: 138 MG/DL (ref 70–99)
GLUCOSE BLDC GLUCOMTR-MCNC: 186 MG/DL (ref 70–99)
GLUCOSE BLDC GLUCOMTR-MCNC: 291 MG/DL (ref 70–99)
GLUCOSE SERPL-MCNC: 166 MG/DL (ref 70–99)
GLUCOSE UR STRIP-MCNC: NEGATIVE MG/DL
HCO3 SERPL-SCNC: 22 MMOL/L (ref 22–29)
HCT VFR BLD AUTO: 37.7 % (ref 40–53)
HGB BLD-MCNC: 11.6 G/DL (ref 13.3–17.7)
HGB UR QL STRIP: NEGATIVE
HOLD SPECIMEN: NORMAL
HYALINE CASTS: 83 /LPF
IMM GRANULOCYTES # BLD: 0 10E3/UL
IMM GRANULOCYTES NFR BLD: 1 %
KETONES UR STRIP-MCNC: NEGATIVE MG/DL
LACTATE SERPL-SCNC: 0.9 MMOL/L (ref 0.7–2)
LEUKOCYTE ESTERASE UR QL STRIP: NEGATIVE
LYMPHOCYTES # BLD AUTO: 0.5 10E3/UL (ref 0.8–5.3)
LYMPHOCYTES NFR BLD AUTO: 9 %
MCH RBC QN AUTO: 30.4 PG (ref 26.5–33)
MCHC RBC AUTO-ENTMCNC: 30.8 G/DL (ref 31.5–36.5)
MCV RBC AUTO: 99 FL (ref 78–100)
MONOCYTES # BLD AUTO: 0.7 10E3/UL (ref 0–1.3)
MONOCYTES NFR BLD AUTO: 15 %
MUCOUS THREADS #/AREA URNS LPF: PRESENT /LPF
NEUTROPHILS # BLD AUTO: 3.7 10E3/UL (ref 1.6–8.3)
NEUTROPHILS NFR BLD AUTO: 74 %
NITRATE UR QL: NEGATIVE
NRBC # BLD AUTO: 0 10E3/UL
NRBC BLD AUTO-RTO: 0 /100
NT-PROBNP SERPL-MCNC: 2140 PG/ML (ref 0–1800)
PH UR STRIP: 5 [PH] (ref 5–9)
PLATELET # BLD AUTO: 156 10E3/UL (ref 150–450)
POTASSIUM SERPL-SCNC: 5.1 MMOL/L (ref 3.4–5.3)
PROCALCITONIN SERPL IA-MCNC: 0.2 NG/ML
RBC # BLD AUTO: 3.82 10E6/UL (ref 4.4–5.9)
RBC URINE: 1 /HPF
SARS-COV-2 RNA RESP QL NAA+PROBE: POSITIVE
SODIUM SERPL-SCNC: 137 MMOL/L (ref 135–145)
SP GR UR STRIP: 1.02 (ref 1–1.03)
UROBILINOGEN UR STRIP-MCNC: NORMAL MG/DL
WBC # BLD AUTO: 5 10E3/UL (ref 4–11)
WBC URINE: 5 /HPF

## 2024-09-14 PROCEDURE — 258N000003 HC RX IP 258 OP 636: Performed by: INTERNAL MEDICINE

## 2024-09-14 PROCEDURE — G0378 HOSPITAL OBSERVATION PER HR: HCPCS

## 2024-09-14 PROCEDURE — 85025 COMPLETE CBC W/AUTO DIFF WBC: CPT | Performed by: FAMILY MEDICINE

## 2024-09-14 PROCEDURE — 96372 THER/PROPH/DIAG INJ SC/IM: CPT | Performed by: INTERNAL MEDICINE

## 2024-09-14 PROCEDURE — 80048 BASIC METABOLIC PNL TOTAL CA: CPT | Performed by: FAMILY MEDICINE

## 2024-09-14 PROCEDURE — 258N000003 HC RX IP 258 OP 636: Performed by: FAMILY MEDICINE

## 2024-09-14 PROCEDURE — 999N000157 HC STATISTIC RCP TIME EA 10 MIN

## 2024-09-14 PROCEDURE — 83605 ASSAY OF LACTIC ACID: CPT | Performed by: INTERNAL MEDICINE

## 2024-09-14 PROCEDURE — 84145 PROCALCITONIN (PCT): CPT | Performed by: INTERNAL MEDICINE

## 2024-09-14 PROCEDURE — 250N000013 HC RX MED GY IP 250 OP 250 PS 637: Performed by: INTERNAL MEDICINE

## 2024-09-14 PROCEDURE — 86140 C-REACTIVE PROTEIN: CPT | Performed by: INTERNAL MEDICINE

## 2024-09-14 PROCEDURE — 87635 SARS-COV-2 COVID-19 AMP PRB: CPT | Performed by: FAMILY MEDICINE

## 2024-09-14 PROCEDURE — 96374 THER/PROPH/DIAG INJ IV PUSH: CPT | Performed by: FAMILY MEDICINE

## 2024-09-14 PROCEDURE — 36415 COLL VENOUS BLD VENIPUNCTURE: CPT | Performed by: FAMILY MEDICINE

## 2024-09-14 PROCEDURE — 120N000001 HC R&B MED SURG/OB

## 2024-09-14 PROCEDURE — 250N000011 HC RX IP 250 OP 636: Performed by: INTERNAL MEDICINE

## 2024-09-14 PROCEDURE — 93005 ELECTROCARDIOGRAM TRACING: CPT | Performed by: FAMILY MEDICINE

## 2024-09-14 PROCEDURE — 99285 EMERGENCY DEPT VISIT HI MDM: CPT | Mod: 25 | Performed by: FAMILY MEDICINE

## 2024-09-14 PROCEDURE — 99285 EMERGENCY DEPT VISIT HI MDM: CPT | Performed by: FAMILY MEDICINE

## 2024-09-14 PROCEDURE — 250N000013 HC RX MED GY IP 250 OP 250 PS 637: Performed by: FAMILY MEDICINE

## 2024-09-14 PROCEDURE — 250N000012 HC RX MED GY IP 250 OP 636 PS 637: Performed by: INTERNAL MEDICINE

## 2024-09-14 PROCEDURE — 250N000011 HC RX IP 250 OP 636: Performed by: FAMILY MEDICINE

## 2024-09-14 PROCEDURE — 83880 ASSAY OF NATRIURETIC PEPTIDE: CPT | Performed by: INTERNAL MEDICINE

## 2024-09-14 PROCEDURE — 99223 1ST HOSP IP/OBS HIGH 75: CPT | Performed by: INTERNAL MEDICINE

## 2024-09-14 PROCEDURE — 96361 HYDRATE IV INFUSION ADD-ON: CPT | Performed by: FAMILY MEDICINE

## 2024-09-14 PROCEDURE — 81001 URINALYSIS AUTO W/SCOPE: CPT | Performed by: INTERNAL MEDICINE

## 2024-09-14 PROCEDURE — 71045 X-RAY EXAM CHEST 1 VIEW: CPT

## 2024-09-14 PROCEDURE — 36415 COLL VENOUS BLD VENIPUNCTURE: CPT | Performed by: INTERNAL MEDICINE

## 2024-09-14 PROCEDURE — 87040 BLOOD CULTURE FOR BACTERIA: CPT | Performed by: INTERNAL MEDICINE

## 2024-09-14 RX ORDER — ONDANSETRON 2 MG/ML
4 INJECTION INTRAMUSCULAR; INTRAVENOUS EVERY 6 HOURS PRN
Status: DISCONTINUED | OUTPATIENT
Start: 2024-09-14 | End: 2024-09-15 | Stop reason: HOSPADM

## 2024-09-14 RX ORDER — PROCHLORPERAZINE 25 MG
12.5 SUPPOSITORY, RECTAL RECTAL EVERY 12 HOURS PRN
Status: DISCONTINUED | OUTPATIENT
Start: 2024-09-14 | End: 2024-09-15 | Stop reason: HOSPADM

## 2024-09-14 RX ORDER — AMOXICILLIN 250 MG
2 CAPSULE ORAL 2 TIMES DAILY PRN
Status: DISCONTINUED | OUTPATIENT
Start: 2024-09-14 | End: 2024-09-15 | Stop reason: HOSPADM

## 2024-09-14 RX ORDER — CLOPIDOGREL BISULFATE 75 MG/1
75 TABLET ORAL DAILY
COMMUNITY

## 2024-09-14 RX ORDER — ALBUTEROL SULFATE 90 UG/1
2 AEROSOL, METERED RESPIRATORY (INHALATION) 4 TIMES DAILY PRN
Status: DISCONTINUED | OUTPATIENT
Start: 2024-09-14 | End: 2024-09-15 | Stop reason: HOSPADM

## 2024-09-14 RX ORDER — ONDANSETRON 4 MG/1
4 TABLET, ORALLY DISINTEGRATING ORAL EVERY 6 HOURS PRN
Status: DISCONTINUED | OUTPATIENT
Start: 2024-09-14 | End: 2024-09-15 | Stop reason: HOSPADM

## 2024-09-14 RX ORDER — FUROSEMIDE 40 MG
40 TABLET ORAL DAILY
COMMUNITY

## 2024-09-14 RX ORDER — SPIRONOLACTONE 25 MG/1
25 TABLET ORAL DAILY
COMMUNITY

## 2024-09-14 RX ORDER — DEXTROSE MONOHYDRATE 25 G/50ML
25-50 INJECTION, SOLUTION INTRAVENOUS
Status: DISCONTINUED | OUTPATIENT
Start: 2024-09-14 | End: 2024-09-15 | Stop reason: HOSPADM

## 2024-09-14 RX ORDER — ROSUVASTATIN CALCIUM 5 MG/1
10 TABLET, COATED ORAL AT BEDTIME
Status: DISCONTINUED | OUTPATIENT
Start: 2024-09-14 | End: 2024-09-15

## 2024-09-14 RX ORDER — AMOXICILLIN 250 MG
1 CAPSULE ORAL 2 TIMES DAILY PRN
Status: DISCONTINUED | OUTPATIENT
Start: 2024-09-14 | End: 2024-09-15 | Stop reason: HOSPADM

## 2024-09-14 RX ORDER — NICOTINE POLACRILEX 4 MG
15-30 LOZENGE BUCCAL
Status: DISCONTINUED | OUTPATIENT
Start: 2024-09-14 | End: 2024-09-15 | Stop reason: HOSPADM

## 2024-09-14 RX ORDER — CARVEDILOL 6.25 MG/1
6.25 TABLET ORAL 2 TIMES DAILY WITH MEALS
COMMUNITY

## 2024-09-14 RX ORDER — PRAVASTATIN SODIUM 10 MG
40 TABLET ORAL DAILY
Status: DISCONTINUED | OUTPATIENT
Start: 2024-09-14 | End: 2024-09-14

## 2024-09-14 RX ORDER — CLOPIDOGREL BISULFATE 75 MG/1
75 TABLET ORAL DAILY
Status: DISCONTINUED | OUTPATIENT
Start: 2024-09-14 | End: 2024-09-15 | Stop reason: HOSPADM

## 2024-09-14 RX ORDER — ACETAMINOPHEN 500 MG
1000 TABLET ORAL 3 TIMES DAILY
Status: DISCONTINUED | OUTPATIENT
Start: 2024-09-14 | End: 2024-09-15 | Stop reason: HOSPADM

## 2024-09-14 RX ORDER — NICOTINE POLACRILEX 4 MG/1
20 GUM, CHEWING ORAL DAILY
Status: DISCONTINUED | OUTPATIENT
Start: 2024-09-14 | End: 2024-09-14

## 2024-09-14 RX ORDER — ACETAMINOPHEN 500 MG
1000 TABLET ORAL EVERY 6 HOURS PRN
Status: DISCONTINUED | OUTPATIENT
Start: 2024-09-14 | End: 2024-09-14

## 2024-09-14 RX ORDER — SODIUM CHLORIDE 9 MG/ML
INJECTION, SOLUTION INTRAVENOUS CONTINUOUS
Status: DISCONTINUED | OUTPATIENT
Start: 2024-09-14 | End: 2024-09-15

## 2024-09-14 RX ORDER — PANTOPRAZOLE SODIUM 40 MG/1
40 TABLET, DELAYED RELEASE ORAL
Status: DISCONTINUED | OUTPATIENT
Start: 2024-09-15 | End: 2024-09-15 | Stop reason: HOSPADM

## 2024-09-14 RX ORDER — CALCIUM CARBONATE 500 MG/1
1000 TABLET, CHEWABLE ORAL 4 TIMES DAILY PRN
Status: DISCONTINUED | OUTPATIENT
Start: 2024-09-14 | End: 2024-09-15 | Stop reason: HOSPADM

## 2024-09-14 RX ORDER — ENOXAPARIN SODIUM 100 MG/ML
30 INJECTION SUBCUTANEOUS EVERY 24 HOURS
Status: DISCONTINUED | OUTPATIENT
Start: 2024-09-14 | End: 2024-09-15

## 2024-09-14 RX ORDER — LIDOCAINE 40 MG/G
CREAM TOPICAL
Status: DISCONTINUED | OUTPATIENT
Start: 2024-09-14 | End: 2024-09-15 | Stop reason: HOSPADM

## 2024-09-14 RX ORDER — PROCHLORPERAZINE MALEATE 5 MG
5 TABLET ORAL EVERY 6 HOURS PRN
Status: DISCONTINUED | OUTPATIENT
Start: 2024-09-14 | End: 2024-09-15 | Stop reason: HOSPADM

## 2024-09-14 RX ORDER — KETOROLAC TROMETHAMINE 15 MG/ML
15 INJECTION, SOLUTION INTRAMUSCULAR; INTRAVENOUS ONCE
Status: COMPLETED | OUTPATIENT
Start: 2024-09-14 | End: 2024-09-14

## 2024-09-14 RX ORDER — ASPIRIN 81 MG/1
81 TABLET ORAL DAILY
Status: DISCONTINUED | OUTPATIENT
Start: 2024-09-14 | End: 2024-09-15 | Stop reason: HOSPADM

## 2024-09-14 RX ORDER — TAMSULOSIN HYDROCHLORIDE 0.4 MG/1
0.8 CAPSULE ORAL AT BEDTIME
Status: DISCONTINUED | OUTPATIENT
Start: 2024-09-14 | End: 2024-09-15 | Stop reason: HOSPADM

## 2024-09-14 RX ORDER — ROSUVASTATIN CALCIUM 40 MG/1
40 TABLET, COATED ORAL DAILY
COMMUNITY

## 2024-09-14 RX ORDER — GABAPENTIN 300 MG/1
600 CAPSULE ORAL 2 TIMES DAILY
Status: DISCONTINUED | OUTPATIENT
Start: 2024-09-14 | End: 2024-09-15

## 2024-09-14 RX ORDER — CALCIUM CARBONATE 500 MG/1
1000 TABLET, CHEWABLE ORAL 4 TIMES DAILY PRN
Status: DISCONTINUED | OUTPATIENT
Start: 2024-09-14 | End: 2024-09-14

## 2024-09-14 RX ORDER — ACETAMINOPHEN 500 MG
1000 TABLET ORAL ONCE
Status: COMPLETED | OUTPATIENT
Start: 2024-09-14 | End: 2024-09-14

## 2024-09-14 RX ADMIN — INSULIN ASPART 2 UNITS: 100 INJECTION, SOLUTION INTRAVENOUS; SUBCUTANEOUS at 17:20

## 2024-09-14 RX ADMIN — SODIUM CHLORIDE 500 ML: 9 INJECTION, SOLUTION INTRAVENOUS at 04:15

## 2024-09-14 RX ADMIN — SODIUM CHLORIDE: 9 INJECTION, SOLUTION INTRAVENOUS at 23:41

## 2024-09-14 RX ADMIN — TAMSULOSIN HYDROCHLORIDE 0.8 MG: 0.4 CAPSULE ORAL at 22:45

## 2024-09-14 RX ADMIN — SODIUM CHLORIDE 500 ML: 9 INJECTION, SOLUTION INTRAVENOUS at 07:47

## 2024-09-14 RX ADMIN — ACETAMINOPHEN 1000 MG: 500 TABLET, FILM COATED ORAL at 22:45

## 2024-09-14 RX ADMIN — ACETAMINOPHEN 1000 MG: 500 TABLET, FILM COATED ORAL at 04:16

## 2024-09-14 RX ADMIN — ACETAMINOPHEN 1000 MG: 500 TABLET, FILM COATED ORAL at 13:24

## 2024-09-14 RX ADMIN — ROSUVASTATIN CALCIUM 10 MG: 5 TABLET, FILM COATED ORAL at 22:45

## 2024-09-14 RX ADMIN — DEXAMETHASONE 6 MG: 2 TABLET ORAL at 11:22

## 2024-09-14 RX ADMIN — ASPIRIN 81 MG: 81 TABLET, COATED ORAL at 13:23

## 2024-09-14 RX ADMIN — ENOXAPARIN SODIUM 30 MG: 30 INJECTION SUBCUTANEOUS at 08:10

## 2024-09-14 RX ADMIN — CLOPIDOGREL BISULFATE 75 MG: 75 TABLET, FILM COATED ORAL at 13:24

## 2024-09-14 RX ADMIN — SODIUM CHLORIDE: 9 INJECTION, SOLUTION INTRAVENOUS at 08:12

## 2024-09-14 RX ADMIN — SODIUM CHLORIDE: 9 INJECTION, SOLUTION INTRAVENOUS at 13:50

## 2024-09-14 RX ADMIN — GABAPENTIN 600 MG: 300 CAPSULE ORAL at 22:45

## 2024-09-14 RX ADMIN — KETOROLAC TROMETHAMINE 15 MG: 15 INJECTION, SOLUTION INTRAMUSCULAR; INTRAVENOUS at 04:16

## 2024-09-14 ASSESSMENT — COLUMBIA-SUICIDE SEVERITY RATING SCALE - C-SSRS
2. HAVE YOU ACTUALLY HAD ANY THOUGHTS OF KILLING YOURSELF IN THE PAST MONTH?: NO
1. IN THE PAST MONTH, HAVE YOU WISHED YOU WERE DEAD OR WISHED YOU COULD GO TO SLEEP AND NOT WAKE UP?: NO
6. HAVE YOU EVER DONE ANYTHING, STARTED TO DO ANYTHING, OR PREPARED TO DO ANYTHING TO END YOUR LIFE?: NO

## 2024-09-14 ASSESSMENT — ACTIVITIES OF DAILY LIVING (ADL)
DIFFICULTY_COMMUNICATING: NO
CONCENTRATING,_REMEMBERING_OR_MAKING_DECISIONS_DIFFICULTY: YES
ADLS_ACUITY_SCORE: 41
THE_FOLLOWING_AIDS_WERE_PROVIDED;: PATIENT DECLINED OFFER OF AUXILIARY AIDS
FALL_HISTORY_WITHIN_LAST_SIX_MONTHS: YES
ADLS_ACUITY_SCORE: 41
CHANGE_IN_FUNCTIONAL_STATUS_SINCE_ONSET_OF_CURRENT_ILLNESS/INJURY: YES
USE_OF_HEARING_ASSISTIVE_DEVICES: BILATERAL HEARING AIDS
ADLS_ACUITY_SCORE: 41
ADLS_ACUITY_SCORE: 41
TOILETING_ASSISTANCE: TOILETING DIFFICULTY, ASSISTANCE 1 PERSON
ADLS_ACUITY_SCORE: 32
TOILETING: 1-->ASSISTANCE (EQUIPMENT/PERSON) NEEDED
DOING_ERRANDS_INDEPENDENTLY_DIFFICULTY: YES
EQUIPMENT_CURRENTLY_USED_AT_HOME: CANE, STRAIGHT
DESCRIBE_HEARING_LOSS: BILATERAL HEARING LOSS
ADLS_ACUITY_SCORE: 41
ADLS_ACUITY_SCORE: 38
ADLS_ACUITY_SCORE: 38
TOILETING_ISSUES: YES
ADLS_ACUITY_SCORE: 41
WEAR_GLASSES_OR_BLIND: YES
HEARING_DIFFICULTY_OR_DEAF: YES
HEARING_MANAGEMENT: HEARING AIDS AT HOME
ADLS_ACUITY_SCORE: 41
DIFFICULTY_EATING/SWALLOWING: NO
ADLS_ACUITY_SCORE: 41
TOILETING: 1-->ASSISTANCE (EQUIPMENT/PERSON) NEEDED (NOT DEVELOPMENTALLY APPROPRIATE)
ADLS_ACUITY_SCORE: 41
VISION_MANAGEMENT: READING GLASSES
ADLS_ACUITY_SCORE: 41
WALKING_OR_CLIMBING_STAIRS: AMBULATION DIFFICULTY, REQUIRES EQUIPMENT
ADLS_ACUITY_SCORE: 41
ADLS_ACUITY_SCORE: 38
DRESSING/BATHING_DIFFICULTY: NO
ADLS_ACUITY_SCORE: 41
NUMBER_OF_TIMES_PATIENT_HAS_FALLEN_WITHIN_LAST_SIX_MONTHS: 1
ADLS_ACUITY_SCORE: 41
WERE_AUXILIARY_AIDS_OFFERED?: YES
WALKING_OR_CLIMBING_STAIRS_DIFFICULTY: YES
ADLS_ACUITY_SCORE: 32

## 2024-09-14 ASSESSMENT — ENCOUNTER SYMPTOMS
FEVER: 1
COUGH: 1
MYALGIAS: 1

## 2024-09-14 NOTE — ED TRIAGE NOTES
Pt presents to ED via [facility]. Pt has fever cough and weakness. Pt fell at home and was unable to get up. Pt has h/o stroke with left sided weakness. Pt having increased weakness. Pt also has short gut syndrome.    Britt Daniels RN on 9/14/2024 at 3:46 AM       Triage Assessment (Adult)       Row Name 09/14/24 0345          Triage Assessment    Airway WDL WDL        Respiratory WDL    Respiratory WDL X;cough     Cough Frequency infrequent        Skin Circulation/Temperature WDL    Skin Circulation/Temperature WDL X;temperature     Skin Temperature cool        Peripheral/Neurovascular WDL    Peripheral Neurovascular WDL WDL

## 2024-09-14 NOTE — H&P
"[facility]    History and Physical  Hospitalist       Date of Admission:  9/14/2024    Assessment & Plan   Jasson Dougherty is a 84 year old male history of insulin-dependent type 2 diabetes, CAD, DAKOTA, essential hypertension, chronic diastolic CHF and COPD who presents with COVID-19 infection acute renal failure    Clinically Significant Risk Factors Present on Admission                # Drug Induced Platelet Defect: home medication list includes an antiplatelet medication   # Hypertension: Noted on problem list         # Obesity: Estimated body mass index is 37.36 kg/m  as calculated from the following:    Height as of this encounter: 1.727 m (5' 8\").    Weight as of this encounter: 111.4 kg (245 lb 11.2 oz).             Principal Problem:    COVID-19    Assessment: PCR positive, significant elevation of inflammatory markers, 86% on room air at rest with acute hypoxic respiratory failure POA and will actively treat with Decadron.  After discussion of risks and benefits with patient and family, given renal failure we will hold on Paxlovid or remdesivir until renal function improving.    Plan:   -Decadron day 1  -Laboratory markers daily  -Escalate addition of remdesivir/baricitinib if respiratory status deteriorating  -Complete infectious workup with UA and blood cultures  -Hold on empiric antibiotics at this point      HTN (hypertension)    Assessment: Asymptomatic hypotension and more likely related to hypovolemia and prerenal insults.  No obvious bacterial infections or sepsis playing a role at this point.  Need close monitoring to ensure improvement.    Plan:   -Repeat 500 mL fluid bolus  -Hold home Norvasc, HCTZ, ACE, beta-blocker    CKD stage III  Assessment: Acute renal failure with baseline creatinine around 1.2.  2.3 on admit and likely prerenal from medications and poor oral intake.  Plan:  -Maintenance IV fluids  -Hold blood pressure regimen as above  -Avoid all further " NSAIDs  -monitor daily    Chronic diastolic heart failure (H)    Assessment: Chronic and stable without evidence of exacerbation and euvolemic on exam, chronically elevated BNP    Plan:   -I's and O's and daily weights      Chronic obstructive pulmonary disease, unspecified (H)    Assessment: Chronic and stable without evidence of exacerbation    Plan:   -Steroids as above, inhalers as needed      Coronary artery disease involving native coronary artery of native heart without angina pectoris    Assessment: Chronic and stable, no concerning cardiopulmonary symptoms.    Plan:   -Continue home aspirin and statin  -Resume home optimize medical management as blood pressures and renal function tolerate    Active Problems:    Type 2 diabetes mellitus without complication (H)    Assessment: Well-controlled with last hemoglobin A1c of 7.0    Plan:   -Hold home Lantus and short acting regiment  -ISS ACHS      Sleep apnea    Assessment: Chronic and compliant with home CPAP    Plan:   -Continue home CPAP      General weakness    Assessment: Likely multifactorial from COVID infection and acute renal failure, no focal neurologic deficits or concern for CVA.    Plan:   -She PT/OT involvement tomorrow for early mobilization      DVT Prophylaxis: Enoxaparin (Lovenox) SQ  Code Status: Full Code    Antolin Mendez MD    Primary Care Physician   Physician No Ref-Primary    Chief Complaint   Fatigue    History is obtained from the patient and chart review.    History of Present Illness   Jasson Dougherty is a 84 year old male history of insulin-dependent type 2 diabetes, CAD, DAKOTA, essential hypertension, chronic diastolic CHF and COPD who presents with COVID-19 infection acute renal failure.    Patient was treated for bronchitis with a course of azithromycin/cefdinir on 7/31, he fully recovered and was seen by his PCP in follow-up on 8/6.  Since he by cardiology on 8/7 and note reviewed, no new medication changes.    Patient had a known  COVID contact 5 days ago, her last 24 hours has developed myalgias, low-grade fevers and significant weakness and a dry nonproductive cough at home.  He was so weak EMS was called for lift assist, he was noted to be hypotensive, brought to the ER.    In the ER he was noted to have systolics in the 80s and 90s, was given 500 mL fluid bolus, IV Toradol, COVID PCR positive and he was subsequently admitted for further management.    Past Medical History    I have reviewed this patient's medical history and updated it with pertinent information if needed.   No past medical history on file.    Past Surgical History   I have reviewed this patient's surgical history and updated it with pertinent information if needed.  Past Surgical History:   Procedure Laterality Date    APPENDECTOMY OPEN      No Comments Provided    BACK SURGERY      fusion    COLONOSCOPY      2015    OTHER SURGICAL HISTORY      [id number],HEMICOLECTOMY    OTHER SURGICAL HISTORY      SUR85,ANKLE ARTHROSCOPY       Prior to Admission Medications   Prior to Admission Medications   Prescriptions Last Dose Informant Patient Reported? Taking?   Blood Glucose Monitoring Suppl (FIFTY50 GLUCOSE METER 2.0) W/DEVICE KIT   Yes No   Blood Glucose Monitoring Suppl (ONE TOUCH BASIC SYSTEM) W/DEVICE KIT   Yes No   ONE TOUCH LANCETS MISC   Yes No   Vitamin D, Cholecalciferol, 25 MCG (1000 UT) TABS   Yes No   Sig: Take 4,000 Units by mouth daily   acetaminophen (TYLENOL) 500 MG tablet   Yes No   Sig: Take 1,000 mg by mouth 2 times daily   amLODIPine (NORVASC) 10 MG tablet   Yes No   Sig: Take 10 mg by mouth daily   aspirin EC 81 MG EC tablet   Yes No   Sig: Take 81 mg by mouth daily with food   blood glucose (NO BRAND SPECIFIED) test strip   Yes No   Sig: check sugars once daily before meals   cyanocobalamin (CYANOCOBALAMIN) 1000 MCG/ML injection   Yes No   Sig: Inject 1 mL into the muscle every 30 days   gabapentin (NEURONTIN) 300 MG capsule   Yes No   Sig: Take 900 mg by  mouth 2 times daily   hydrochlorothiazide (HYDRODIURIL) 25 MG tablet   Yes No   Sig: Take 25 mg by mouth daily   insulin aspart (NOVOLOG PEN) 100 UNIT/ML injection   Yes No   Sig: Inject 20 Units Subcutaneous 3 times daily (with meals) Give within 15 minutes before or after meals/snacks.   insulin glargine (LANTUS PEN) 100 UNIT/ML pen   Yes No   Sig: Inject 40 Units Subcutaneous 2 times daily   insulin pen needle 32G X 8 MM   Yes No   Si each   lancets 28G MISC   Yes No   lisinopril (ZESTRIL) 40 MG tablet   Yes No   Sig: Take 40 mg by mouth daily   metoprolol tartrate (LOPRESSOR) 50 MG tablet   Yes No   Sig: Take 25 mg by mouth 2 times daily   nitroGLYcerin (NITROSTAT) 0.4 MG sublingual tablet   Yes No   Sig: Place 0.4 mg under the tongue every 5 minutes as needed for chest pain For chest pain place 1 tablet under the tongue every 5 minutes for 3 doses. If symptoms persist 5 minutes after 1st dose call 911.   omeprazole 20 MG tablet   Yes No   Sig: Take 20 mg by mouth daily   pravastatin (PRAVACHOL) 40 MG tablet   Yes No   Sig: Take 40 mg by mouth daily   tamsulosin (FLOMAX) 0.4 MG capsule   Yes No   Sig: Take 0.4 mg by mouth daily      Facility-Administered Medications: None     Allergies   Allergies   Allergen Reactions    Penicillins Rash and Unknown       Social History   I have reviewed this patient's social history and updated it with pertinent information if needed. Jasson Dougherty  reports that he quit smoking about 28 years ago. His smoking use included cigarettes. He has never used smokeless tobacco. He reports that he does not drink alcohol and does not use drugs.    Family History   I have reviewed this patient's family history and updated it with pertinent information if needed.   No family history on file.    Review of Systems     Constitutional: Negative for energy and appetite.  Eyes: no changes in vision  Ears, nose, mouth, throat, and face: no sinus congestion, sore throat, dysphagia, or  odynophagia  Respiratory: no shortness of breath, cough is dry and nonproductive, no significant wheezing, no aspiration symptoms.  Cardiovascular: no chest pain, palpitations, orthopnea, increased lower extremity edema, or syncope.   Gastrointestinal: no constipation, he has intermittent chronic diarrhea from short gut syndrome, no nausea, vomiting or abdominal pain.  Genitourinary: no dysuria, hematuria, urgency or frequency.   Hematologic/lymphatic: no unintentional weight loss or night sweats.  Musculoskeletal: no pain to extremities or falls.   Neurological: no new weakness, tingling, numbness.   Endocrine: blood sugars have been well controlled.     Physical Exam   Temp: 98.9  F (37.2  C) Temp src: Tympanic BP: (!) 96/33 Pulse: 64   Resp: 20 SpO2: 93 % O2 Device: Nasal cannula Oxygen Delivery: 2 LPM  Vital Signs with Ranges  Temp:  [98.9  F (37.2  C)-100.8  F (38.2  C)] 98.9  F (37.2  C)  Pulse:  [60-74] 64  Resp:  [5-28] 20  BP: ()/(31-65) 96/33  SpO2:  [84 %-100 %] 93 %  245 lbs 11.2 oz    GENERAL: Talkative, spontaneously awake, laying in bed, pleasant and appropriate, in no apparent distress.  Head: normocephalic and atraumatic  Eyes: anicteric and non-injected sclera  Nose: no rhinorrhea or epistaxis.   Throat: moist mucous membranes with no active oral lesions.  NECK: Supple, jugular venous distension not present.  CARDIOVASCULAR: regular rate and rhythm, no murmurs, rubs, or gallops. Normal S1/S2.  Trace pitting and symmetric lower extremity edema.   RESPIRATORY: clear to auscultation bilaterally, no wheezes, no crackles.  No accessory muscle use or evidence of respiratory distress.   GI: Obese, soft, non-tender, non-distended, normoactive bowel sounds.  MUSCULOSKELETAL: warm and well perfused, 2+ dorsalis pedis pulses.    SKIN: no pallor, jaundice or rashes.  NEUROLOGY: AAOx3, follows commands, speech and language without focal deficits.      Data   Data reviewed today:   Recent Labs   Lab  09/14/24  0743 09/14/24  0432   WBC  --  5.0   HGB  --  11.6*   MCV  --  99   PLT  --  156   NA  --  137   POTASSIUM  --  5.1   CHLORIDE  --  101   CO2  --  22   BUN  --  40.8*   CR  --  2.32*   ANIONGAP  --  14   YOVANA  --  8.3*   * 166*       Recent Results (from the past 24 hour(s))   XR Chest Port 1 View    Narrative    PROCEDURE INFORMATION:   Exam: XR Chest   Exam date and time: 9/14/2024 4:19 AM   Age: 84 years old   Clinical indication: Cough and fever; Patient HX: Covid+; Additional info:   Cough, fever     TECHNIQUE:   Imaging protocol: Radiologic exam of the chest.   Views: 1 view.     COMPARISON:   CT CHEST PULMONARY EMBOLISM W CONTRAST 9/20/2023 7:41 PM     FINDINGS:    Lungs: Chronic appearing increased interstitial markings noted. Lungs   otherwise clear.    Pleural spaces: No definite pneumothorax. No pleural effusion.    Heart/Mediastinum: Cardiac silhouette mildly enlarged, and pulmonary   vasculature within normal limits.    Bones/Joints: Osseous structures unchanged.       Impression    IMPRESSION:   No focal consolidation/pneumonia.     THIS DOCUMENT HAS BEEN ELECTRONICALLY SIGNED BY JAMES GRIMES MD       Disposition Plan     Medically Ready for Discharge: Anticipated in 2-4 Days

## 2024-09-14 NOTE — PROGRESS NOTES
SAFETY CHECKLIST  ID Bands and Risk clasps correct and in place (DNR, Fall risk, Allergy, Latex, Limb):  Yes  All Lines Reconciled and labeled correctly: Yes  Whiteboard updated:Yes  Environmental interventions: Yes    Verify Tele #:  JOLIE Lindquist RN on 9/14/2024 at 7:20 AM

## 2024-09-14 NOTE — PLAN OF CARE
Goal Outcome Evaluation:          Plan of Care Reviewed With: patient    Overall Patient Progress: improvingOverall Patient Progress: improving    Outcome Evaluation: VSS. Afebrile. On RA. Appetite is increasing. Up ambulating more.

## 2024-09-14 NOTE — PROGRESS NOTES
"[facility] [facility] [place] ADMISSION NOTE    Patient admitted to room 350 at approximately 6:30 via wheel chair from emergency room. Patient was accompanied by significant other.     Verbal SBAR report received from Leonor prior to patient arrival.     Patient ambulated to bed with one assist. Patient alert and oriented X 3. Pain is controlled with current analgesics.  Medication(s) being used: prescription NSAID's including ketorolac (Toradol).  . Admission vital signs: Blood pressure (!) 86/41, pulse 64, temperature 98.9  F (37.2  C), temperature source Tympanic, resp. rate 20, height 1.727 m (5' 8\"), weight 110.7 kg (244 lb), SpO2 93%. Patient and significant other were oriented to plan of care, call light, bed controls, tv, telephone, bathroom, and visiting hours.     Risk Assessment    The following safety risks were identified during admission: fall. Yellow risk band applied: NO.     Skin Initial Assessment    This writer admitted this patient and completed a full skin assessment and Les score in the Adult PCS flowsheet.   Photo documentation of skin problem and/or wound competed via [employer] application (located under Media):  N/A    Appropriate interventions initiated as needed.            Education    Patient has a [place] to Observation order: Yes  Observation education completed and documented: Yes      Litzy Castillo RN      "

## 2024-09-14 NOTE — ED PROVIDER NOTES
History     Chief Complaint   Patient presents with    Hypotension    Fall     The history is provided by the patient, the EMS personnel and medical records.     Jasson Dougherty is a 84 year old male here by EMS. He reports body aches, some cough and fever.     His wife and son are here. They tell me he was exposed to COVID at a gathering on Monday, five days ago.    Per EMS: They were called out for a lift assist and Jasson was refusing transport. They found SBP  of 80 mmHg and 90 mmHg and convinced him to be seen.  They gave some IVF en route for hypotension.    He has a history of type 2 DM, obesity, rectal cancer, HTN, sleep apnea, CVA. He lives at home.    Allergies:  Allergies   Allergen Reactions    Penicillins Rash and Unknown       Problem List:    Patient Active Problem List    Diagnosis Date Noted    General weakness 09/14/2024     Priority: Medium    COVID-19 09/14/2024     Priority: Medium    Influenza A 11/30/2022     Priority: Medium    Hypoxia 11/30/2022     Priority: Medium    Fever in other diseases 11/30/2022     Priority: Medium    Acute respiratory failure with hypoxia (H) 11/30/2022     Priority: Medium    Right shoulder pain 06/01/2016     Priority: Medium    Primary osteoarthritis of left wrist 04/12/2016     Priority: Medium    Carotid stenosis, right 01/11/2016     Priority: Medium    Cerebrovascular accident (CVA) due to embolism of right middle cerebral artery (H) 01/11/2016     Priority: Medium    CVA, old, monoplegia upper limb 12/23/2015     Priority: Medium    Malignant neoplasm of descending colon (H) 12/23/2015     Priority: Medium    MI, old 12/23/2015     Priority: Medium    Stenosis of right carotid artery 12/23/2015     Priority: Medium    Sleep apnea 08/05/2015     Priority: Medium    Rectal cancer (H) 06/29/2015     Priority: Medium    Arthrodesis status 06/26/2015     Priority: Medium     Overview:   [facility]      Morbid obesity (H) 06/26/2015      Priority: Medium     Overview:   Complicated by severe DJD in back and LEs, Diabetes, [place], GERD, Hypertension.      Malignant neoplasm of rectum (H) 2015     Priority: Medium    Routine general medical examination at a health care facility 2013     Priority: Medium    Type 2 diabetes mellitus without complication (H) 2012     Priority: Medium     Overview:   [facility]      Backache 2012     Priority: Medium    Hypercholesteremia 2009     Priority: Medium     Overview:   IMO Update 10/11      Esophageal reflux 2004     Priority: Medium     Overview:   [place]      HTN (hypertension) 2004     Priority: Medium     Overview:   HTN          Past Medical History:    No past medical history on file.    Past Surgical History:    Past Surgical History:   Procedure Laterality Date    APPENDECTOMY OPEN      No Comments Provided    BACK SURGERY      fusion    COLONOSCOPY          OTHER SURGICAL HISTORY      YOD329,HEMICOLECTOMY    OTHER SURGICAL HISTORY      SUR85,ANKLE ARTHROSCOPY       Family History:    No family history on file.    Social History:  Marital Status:   [2]  Social History     Tobacco Use    Smoking status: Former     Current packs/day: 0.00     Types: Cigarettes     Quit date: 1995     Years since quittin.7    Smokeless tobacco: Never   Substance Use Topics    Alcohol use: No     Alcohol/week: 0.0 standard drinks of alcohol    Drug use: Never     Comment: Drug use: No        Medications:    acetaminophen (TYLENOL) 500 MG tablet  amLODIPine (NORVASC) 10 MG tablet  aspirin EC 81 MG EC tablet  blood glucose (NO BRAND SPECIFIED) test strip  Blood Glucose Monitoring Suppl (FIFTY50 GLUCOSE METER 2.0) W/DEVICE KIT  Blood Glucose Monitoring Suppl (ONE TOUCH BASIC SYSTEM) W/DEVICE KIT  cyanocobalamin (CYANOCOBALAMIN) 1000 MCG/ML injection  gabapentin (NEURONTIN) 300 MG capsule  hydrochlorothiazide (HYDRODIURIL) 25 MG tablet  insulin aspart (NOVOLOG  "PEN) 100 UNIT/ML injection  insulin glargine (LANTUS PEN) 100 UNIT/ML pen  insulin pen needle 32G X 8 MM  lancets 28G MISC  lisinopril (ZESTRIL) 40 MG tablet  metoprolol tartrate (LOPRESSOR) 50 MG tablet  nitroGLYcerin (NITROSTAT) 0.4 MG sublingual tablet  omeprazole 20 MG tablet  ONE TOUCH LANCETS MISC  pravastatin (PRAVACHOL) 40 MG tablet  tamsulosin (FLOMAX) 0.4 MG capsule  Vitamin D, Cholecalciferol, 25 MCG (1000 UT) TABS      Review of Systems   Constitutional:  Positive for fever.   Respiratory:  Positive for cough.    Musculoskeletal:  Positive for myalgias.   All other systems reviewed and are negative.      Physical Exam   BP: 132/65  Pulse: 74  Temp: (!) 100.8  F (38.2  C)  Resp: 26  Height: 172.7 cm (5' 8\")  Weight: 110.7 kg (244 lb)  SpO2: (!) 84 %      Physical Exam  Vitals and nursing note reviewed.   Constitutional:       General: He is not in acute distress.     Appearance: Normal appearance. He is obese. He is not ill-appearing.   Cardiovascular:      Rate and Rhythm: Normal rate and regular rhythm.      Pulses: Normal pulses.      Heart sounds: Normal heart sounds.   Pulmonary:      Effort: Pulmonary effort is normal. No respiratory distress.      Breath sounds: Normal breath sounds.   Abdominal:      General: Bowel sounds are normal.      Palpations: Abdomen is soft.      Tenderness: There is no abdominal tenderness.   Musculoskeletal:      Right lower leg: Edema present.      Left lower leg: Edema present.   Skin:     General: Skin is warm and dry.   Neurological:      General: No focal deficit present.      Mental Status: He is alert and oriented to person, place, and time.         Results for orders placed or performed during the hospital encounter of 09/14/24 (from the past 24 hour(s))   Symptomatic COVID-19 Virus (Coronavirus) by PCR Nose    Specimen: Nose; Swab   Result Value Ref Range    SARS CoV2 PCR Positive (A) Negative    Narrative    Testing was performed using the [employer] " SARS-CoV-2 Assay on the [employer]. Additional information about this assay can be found via the Test Directory. This [place] FDA cleared test should be ordered for the detection of SARS-CoV-2 in individuals with signs and symptoms of respiratory tract infection. This test is for in vitro diagnostic use under the [place] FDA for laboratories certified under CLIA to perform high complexity testing. A negative result does not rule out the presence of PCR inhibitors in the specimen or target RNA concentration below the limit of detection for the assay. The possibility of a false negative should be considered if the patient's recent exposure or clinical presentation suggests COVID-19. This test was validated by [facility]. These Laboratories are certified under the  Clinical Laboratory Improvement Amendments (CLIA) as qualified to perform high complexity testing.   XR Chest Port 1 View    Narrative    PROCEDURE INFORMATION:   Exam: XR Chest   Exam date and time: 9/14/2024 4:19 AM   Age: 84 years old   Clinical indication: Cough and fever; Patient HX: Covid+; Additional info:   Cough, fever     TECHNIQUE:   Imaging protocol: Radiologic exam of the chest.   Views: 1 view.     COMPARISON:   CT CHEST PULMONARY EMBOLISM W CONTRAST 9/20/2023 7:41 PM     FINDINGS:    Lungs: Chronic appearing increased interstitial markings noted. Lungs   otherwise clear.    Pleural spaces: No definite pneumothorax. No pleural effusion.    Heart/Mediastinum: Cardiac silhouette mildly enlarged, and pulmonary   vasculature within normal limits.    Bones/Joints: Osseous structures unchanged.       Impression    IMPRESSION:   No focal consolidation/pneumonia.     THIS DOCUMENT HAS BEEN ELECTRONICALLY SIGNED BY JAMES GRIMES MD   CBC with platelets differential    Narrative    The following orders were created for panel order CBC with platelets differential.  Procedure                               Abnormality          Status                     ---------                               -----------         ------                     CBC with platelets and d...[[id number]]  Abnormal            Final result                 Please view results for these tests on the individual orders.   Basic metabolic panel   Result Value Ref Range    Sodium 137 135 - 145 mmol/L    Potassium 5.1 3.4 - 5.3 mmol/L    Chloride 101 98 - 107 mmol/L    Carbon Dioxide (CO2) 22 22 - 29 mmol/L    Anion Gap 14 7 - 15 mmol/L    Urea Nitrogen 40.8 (H) 8.0 - 23.0 mg/dL    Creatinine 2.32 (H) 0.67 - 1.17 mg/dL    GFR Estimate 27 (L) >60 mL/min/1.73m2    Calcium 8.3 (L) 8.8 - 10.4 mg/dL    Glucose 166 (H) 70 - 99 mg/dL   Extra Tube    Narrative    The following orders were created for panel order Extra Tube.  Procedure                               Abnormality         Status                     ---------                               -----------         ------                     Extra Green Top (Lithium...[[id number]]                      Final result                 Please view results for these tests on the individual orders.   Extra Tube    Narrative    The following orders were created for panel order Extra Tube.  Procedure                               Abnormality         Status                     ---------                               -----------         ------                     Extra Blue Top Tube[[id number]]                              Final result               Extra Red Top Tube[[id number]]                               Final result                 Please view results for these tests on the individual orders.   Extra Green Top (Lithium Heparin) ON ICE   Result Value Ref Range    Hold Specimen JIC    Extra Blue Top Tube   Result Value Ref Range    Hold Specimen JIC    Extra Red Top Tube   Result Value Ref Range    Hold Specimen JIC    CBC with platelets and differential   Result Value Ref Range    WBC Count 5.0 4.0 - 11.0 10e3/uL    RBC Count 3.82 (L)  "4.40 - 5.90 10e6/uL    Hemoglobin 11.6 (L) 13.3 - 17.7 g/dL    Hematocrit 37.7 (L) 40.0 - 53.0 %    MCV 99 78 - 100 fL    MCH 30.4 26.5 - 33.0 pg    MCHC 30.8 (L) 31.5 - 36.5 g/dL    RDW 13.7 10.0 - 15.0 %    Platelet Count 156 150 - 450 10e3/uL    % Neutrophils 74 %    % Lymphocytes 9 %    % Monocytes 15 %    % Eosinophils 1 %    % Basophils 1 %    % Immature Granulocytes 1 %    NRBCs per 100 WBC 0 <1 /100    Absolute Neutrophils 3.7 1.6 - 8.3 10e3/uL    Absolute Lymphocytes 0.5 (L) 0.8 - 5.3 10e3/uL    Absolute Monocytes 0.7 0.0 - 1.3 10e3/uL    Absolute Eosinophils 0.1 0.0 - 0.7 10e3/uL    Absolute Basophils 0.0 0.0 - 0.2 10e3/uL    Absolute Immature Granulocytes 0.0 <=0.4 10e3/uL    Absolute NRBCs 0.0 10e3/uL       Medications   calcium carbonate (TUMS) chewable tablet 1,000 mg (has no administration in time range)   ondansetron (ZOFRAN ODT) ODT tab 4 mg (has no administration in time range)     Or   ondansetron (ZOFRAN) injection 4 mg (has no administration in time range)   sodium chloride 0.9% BOLUS 500 mL (0 mLs Intravenous Stopped 9/14/24 8192)   ketorolac (TORADOL) injection 15 mg (15 mg Intravenous $Given 9/14/24 0416)   acetaminophen (TYLENOL) tablet 1,000 mg (1,000 mg Oral $Given 9/14/24 0416)       Assessments & Plan (with Medical Decision Making)  Jasson Dougherty is a 84 year old male here by EMS. He reports body aches, some cough and fever.   His wife and son are here. They tell me he was exposed to COVID at a gathering on Monday, five days ago.  Per EMS: They were called out for a lift assist and Jasson was refusing transport. They found SBP  of 80 mmHg and 90 mmHg and convinced him to be seen.  They gave some IVF en route for hypotension.  He has a history of type 2 DM, obesity, rectal cancer, HTN, sleep apnea, CVA.  He lives at home.  VS in the ED shows fever BP (!) 101/35   Pulse 61   Temp (!) 100.8  F (38.2  C) (Tympanic)   Resp 21   Ht 1.727 m (5' 8\")   Wt 110.7 kg (244 lb)   SpO2 97%   " BMI 37.10 kg/m    Exam shows normal heart and lung sounds.  We gave IVF, Toradol, Tylenol  Labs show CBC with hgb 11.6, BMP with BUN 41, Cr 2.32, COVID positive.  Chest xray stable.  5:19 AM  We did try to get him up and he is too weak to be at home.    5:29 AM  I spoke with Dr Amos about this and he has accepted him.     I have reviewed the nursing notes.    I have reviewed the findings, diagnosis, plan and need for follow up with the patient.  Medical Decision Making  The patient's presentation was of moderate complexity (an acute illness with systemic symptoms).    The patient's evaluation involved:  an assessment requiring an independent historian (see separate area of note for details)  ordering and/or review of 3+ test(s) in this encounter (see separate area of note for details)    The patient's management necessitated moderate risk (prescription drug management including medications given in the ED) and high risk (a decision regarding hospitalization).      Final diagnoses:   COVID-19   General weakness       9/14/2024   [facility] AND [facility]       Jose Urena MD  09/14/24 7529       Jose Urena MD  09/14/24 3879

## 2024-09-14 NOTE — PHARMACY-ADMISSION MEDICATION HISTORY
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)    CVA, old, monoplegia upper limb    Malignant neoplasm of descending colon (H)    Primary osteoarthritis of left wrist    Right shoulder pain    Stenosis of right carotid artery    Influenza A    Acute respiratory failure with hypoxia (H)    General weakness    COVID-19    Chronic diastolic heart failure (H)    Chronic kidney disease, stage III (moderate) (H)    Chronic left shoulder pain    Carotid artery plaque, bilateral    Chronic obstructive pulmonary disease, unspecified (H)    Coronary artery disease involving native coronary artery of native heart without angina pectoris    PAD (peripheral artery disease) (H24)    S/P carotid endarterectomy        Relevant Labs:  Recent Labs   Lab Test 09/14/24  0432 09/21/23  0056   WBC 5.0 11.3*   HGB 11.6* 13.1*    208        CrCl: 27.7mL/min, Anticiapate rapid improvement in the next 24 hours per provider. Will likely dose adjust back to home doses.       Intake/Output Summary (Last 24 hours) at 9/14/2024 1220  Last data filed at 9/14/2024 1001  Gross per 24 hour   Intake --   Output 300 ml   Net -300 ml          Per Renal Dose Adjustment Protocol, will adjust:  Gabapentin 900mg BID -> 600mg BID     Rosuvastatin 40mg DAILY -> 10mg DAILY       Will continue to follow and make adjustments accordingly. Thank You.    Diego Vásquez [facility] ....................  9/14/2024   12:20 PM

## 2024-09-14 NOTE — ED NOTES
Pt road test performed. Pt feeling weak and very unsteady while standing at bedside. Unable to ambulate at this time. Provider updated.

## 2024-09-14 NOTE — PROGRESS NOTES
Pt has home CPAP device that is not at bedside currently. Spoke with charge RN to ask family to bring in CPAP. Pt wears 1.5 liters chronically at NOC w/ CPAP. No acute RT concerns at this time. Pt is on room air.     Andrea Riedell, RT

## 2024-09-14 NOTE — PHARMACY-ADMISSION MEDICATION HISTORY
"Pharmacist Admission Medication History    Admission medication history is complete. The information provided in this note is only as accurate as the sources available at the time of the update.    Information Source(s): Patient, Family member, and Phil/PITA Maya from 7/31/24 visit via phone, in-person.     Pertinent Information: Patient attested that [facility] ([facility] sets up meds, and he does not recall doses of pills. Called Wife whom verified Rx bottles at home Wife attests that patient does 36units BID of lantus while patient thinks its 40units. Medlist required many changes. [facility]ist [facility], [facility] states patient on 2.5mg Norvasc and has norvasc reaction (tolerating >6 month)    Changes made to PTA medication list:  Added: Carevdilol, Clopidogrel, B12 tab, Lasix, Crestor, Entresto, Spironolactone  Deleted: B12 INJ, hydrochlorothiazide, Metoprolol, Lisinopril,   Changed: Flomax 1 tab daily AM -> 2 tab daily PM, Lantus 40 units BID -> 36 units BID, Norvasc 10mg -> 5mg.       Allergies reviewed with patient and updates made in EHR: yes    Medication History Completed By: Diego Vásquez RPH 9/14/2024 11:14 AM    PTA Med List   Medication Sig Last Dose    acetaminophen (TYLENOL) 500 MG tablet Take 1,000 mg by mouth 2 times daily 9/13/2024 at AM    amLODIPine (NORVASC) 5 MG tablet Take 5 mg by mouth daily. 9/13/2024 at AM    aspirin EC 81 MG EC tablet Take 81 mg by mouth daily with food 9/13/2024 at AM    carvedilol (COREG) 6.25 MG tablet Take 6.25 mg by mouth 2 times daily (with meals). 9/13/2024 at PM    clopidogrel (PLAVIX) 75 MG tablet Take 75 mg by mouth daily. 9/13/2024 at AM    furosemide (LASIX) 40 MG tablet Take 40 mg by mouth daily. 9/13/2024 at AM    gabapentin (NEURONTIN) 300 MG capsule Take 900 mg by mouth 2 times daily 9/13/2024 at AM    insulin aspart (NOVOLOG PEN) 100 UNIT/ML injection Inject 20 Units Subcutaneous 3 times " daily (with meals) Give within 15 minutes before or after meals/snacks. 9/13/2024 at PM    insulin glargine (LANTUS PEN) 100 UNIT/ML pen Inject 36 Units subcutaneously 2 times daily. 9/13/2024 at PM    nitroGLYcerin (NITROSTAT) 0.4 MG sublingual tablet Place 0.4 mg under the tongue every 5 minutes as needed for chest pain For chest pain place 1 tablet under the tongue every 5 minutes for 3 doses. If symptoms persist 5 minutes after 1st dose call 911. Unknown    omeprazole 20 MG tablet Take 20 mg by mouth daily 9/13/2024 at AM    rosuvastatin (CRESTOR) 40 MG tablet Take 40 mg by mouth daily. 9/13/2024 at PM    sacubitril-valsartan (ENTRESTO)  MG per tablet Take 1 tablet by mouth 2 times daily. 9/13/2024 at AM    spironolactone (ALDACTONE) 25 MG tablet Take 25 mg by mouth daily. 9/13/2024 at AM    tamsulosin (FLOMAX) 0.4 MG capsule Take 0.8 mg by mouth daily. 9/13/2024 at PM    vitamin B-12 (CYANOCOBALAMIN) 500 MCG tablet Take 1 tablet by mouth daily. 9/13/2024 at AM    Vitamin D, Cholecalciferol, 25 MCG (1000 UT) TABS Take 1 tablet by mouth daily. 9/13/2024 at AM

## 2024-09-15 ENCOUNTER — APPOINTMENT (OUTPATIENT)
Dept: OCCUPATIONAL THERAPY | Facility: OTHER | Age: 84
End: 2024-09-15
Attending: INTERNAL MEDICINE
Payer: COMMERCIAL

## 2024-09-15 ENCOUNTER — HEALTH MAINTENANCE LETTER (OUTPATIENT)
Age: 84
End: 2024-09-15

## 2024-09-15 ENCOUNTER — APPOINTMENT (OUTPATIENT)
Dept: PHYSICAL THERAPY | Facility: OTHER | Age: 84
End: 2024-09-15
Payer: COMMERCIAL

## 2024-09-15 VITALS
BODY MASS INDEX: 37.24 KG/M2 | OXYGEN SATURATION: 91 % | RESPIRATION RATE: 20 BRPM | HEIGHT: 68 IN | HEART RATE: 56 BPM | WEIGHT: 245.7 LBS | TEMPERATURE: 98.7 F | SYSTOLIC BLOOD PRESSURE: 150 MMHG | DIASTOLIC BLOOD PRESSURE: 45 MMHG

## 2024-09-15 LAB
ANION GAP SERPL CALCULATED.3IONS-SCNC: 12 MMOL/L (ref 7–15)
BUN SERPL-MCNC: 33.7 MG/DL (ref 8–23)
CALCIUM SERPL-MCNC: 8.3 MG/DL (ref 8.8–10.4)
CHLORIDE SERPL-SCNC: 107 MMOL/L (ref 98–107)
CREAT SERPL-MCNC: 1.53 MG/DL (ref 0.67–1.17)
CRP SERPL-MCNC: 80.1 MG/L
EGFRCR SERPLBLD CKD-EPI 2021: 44 ML/MIN/1.73M2
ERYTHROCYTE [DISTWIDTH] IN BLOOD BY AUTOMATED COUNT: 13.2 % (ref 10–15)
GLUCOSE BLDC GLUCOMTR-MCNC: 186 MG/DL (ref 70–99)
GLUCOSE BLDC GLUCOMTR-MCNC: 199 MG/DL (ref 70–99)
GLUCOSE SERPL-MCNC: 219 MG/DL (ref 70–99)
HCO3 SERPL-SCNC: 20 MMOL/L (ref 22–29)
HCT VFR BLD AUTO: 35.9 % (ref 40–53)
HGB BLD-MCNC: 11.6 G/DL (ref 13.3–17.7)
HOLD SPECIMEN: NORMAL
MCH RBC QN AUTO: 30.9 PG (ref 26.5–33)
MCHC RBC AUTO-ENTMCNC: 32.3 G/DL (ref 31.5–36.5)
MCV RBC AUTO: 96 FL (ref 78–100)
PLATELET # BLD AUTO: 159 10E3/UL (ref 150–450)
POTASSIUM SERPL-SCNC: 5.3 MMOL/L (ref 3.4–5.3)
RBC # BLD AUTO: 3.75 10E6/UL (ref 4.4–5.9)
SODIUM SERPL-SCNC: 139 MMOL/L (ref 135–145)
WBC # BLD AUTO: 4.8 10E3/UL (ref 4–11)

## 2024-09-15 PROCEDURE — 97116 GAIT TRAINING THERAPY: CPT | Mod: GP

## 2024-09-15 PROCEDURE — G0378 HOSPITAL OBSERVATION PER HR: HCPCS

## 2024-09-15 PROCEDURE — 80048 BASIC METABOLIC PNL TOTAL CA: CPT | Performed by: INTERNAL MEDICINE

## 2024-09-15 PROCEDURE — 97530 THERAPEUTIC ACTIVITIES: CPT | Mod: GP

## 2024-09-15 PROCEDURE — 97165 OT EVAL LOW COMPLEX 30 MIN: CPT | Mod: GO | Performed by: OCCUPATIONAL THERAPIST

## 2024-09-15 PROCEDURE — 36415 COLL VENOUS BLD VENIPUNCTURE: CPT | Performed by: INTERNAL MEDICINE

## 2024-09-15 PROCEDURE — 85027 COMPLETE CBC AUTOMATED: CPT | Performed by: INTERNAL MEDICINE

## 2024-09-15 PROCEDURE — 97530 THERAPEUTIC ACTIVITIES: CPT | Mod: GO | Performed by: OCCUPATIONAL THERAPIST

## 2024-09-15 PROCEDURE — 97161 PT EVAL LOW COMPLEX 20 MIN: CPT | Mod: GP

## 2024-09-15 PROCEDURE — 250N000012 HC RX MED GY IP 250 OP 636 PS 637: Performed by: INTERNAL MEDICINE

## 2024-09-15 PROCEDURE — 86140 C-REACTIVE PROTEIN: CPT | Performed by: INTERNAL MEDICINE

## 2024-09-15 PROCEDURE — 96372 THER/PROPH/DIAG INJ SC/IM: CPT | Mod: XU | Performed by: INTERNAL MEDICINE

## 2024-09-15 PROCEDURE — 99239 HOSP IP/OBS DSCHRG MGMT >30: CPT | Performed by: INTERNAL MEDICINE

## 2024-09-15 PROCEDURE — 250N000011 HC RX IP 250 OP 636: Performed by: INTERNAL MEDICINE

## 2024-09-15 PROCEDURE — 250N000013 HC RX MED GY IP 250 OP 250 PS 637: Performed by: INTERNAL MEDICINE

## 2024-09-15 RX ORDER — CARVEDILOL 6.25 MG/1
6.25 TABLET ORAL 2 TIMES DAILY WITH MEALS
Status: DISCONTINUED | OUTPATIENT
Start: 2024-09-15 | End: 2024-09-15 | Stop reason: HOSPADM

## 2024-09-15 RX ORDER — ROSUVASTATIN CALCIUM 20 MG/1
40 TABLET, COATED ORAL AT BEDTIME
Status: DISCONTINUED | OUTPATIENT
Start: 2024-09-15 | End: 2024-09-15 | Stop reason: HOSPADM

## 2024-09-15 RX ORDER — ENOXAPARIN SODIUM 100 MG/ML
40 INJECTION SUBCUTANEOUS EVERY 24 HOURS
Status: DISCONTINUED | OUTPATIENT
Start: 2024-09-15 | End: 2024-09-15 | Stop reason: HOSPADM

## 2024-09-15 RX ORDER — GABAPENTIN 300 MG/1
900 CAPSULE ORAL 2 TIMES DAILY
Status: DISCONTINUED | OUTPATIENT
Start: 2024-09-15 | End: 2024-09-15 | Stop reason: HOSPADM

## 2024-09-15 RX ORDER — AMLODIPINE BESYLATE 5 MG/1
5 TABLET ORAL DAILY
Status: DISCONTINUED | OUTPATIENT
Start: 2024-09-15 | End: 2024-09-15 | Stop reason: HOSPADM

## 2024-09-15 RX ADMIN — CLOPIDOGREL BISULFATE 75 MG: 75 TABLET, FILM COATED ORAL at 10:10

## 2024-09-15 RX ADMIN — ACETAMINOPHEN 1000 MG: 500 TABLET, FILM COATED ORAL at 05:41

## 2024-09-15 RX ADMIN — AMLODIPINE BESYLATE 5 MG: 5 TABLET ORAL at 10:10

## 2024-09-15 RX ADMIN — PANTOPRAZOLE SODIUM 40 MG: 40 TABLET, DELAYED RELEASE ORAL at 09:03

## 2024-09-15 RX ADMIN — INSULIN ASPART 2 UNITS: 100 INJECTION, SOLUTION INTRAVENOUS; SUBCUTANEOUS at 09:03

## 2024-09-15 RX ADMIN — SACUBITRIL AND VALSARTAN 4 TABLET: 24; 26 TABLET, FILM COATED ORAL at 10:09

## 2024-09-15 RX ADMIN — ENOXAPARIN SODIUM 40 MG: 40 INJECTION SUBCUTANEOUS at 10:10

## 2024-09-15 RX ADMIN — ASPIRIN 81 MG: 81 TABLET, COATED ORAL at 10:10

## 2024-09-15 RX ADMIN — GABAPENTIN 900 MG: 300 CAPSULE ORAL at 10:10

## 2024-09-15 RX ADMIN — CARVEDILOL 6.25 MG: 6.25 TABLET, FILM COATED ORAL at 09:03

## 2024-09-15 RX ADMIN — DEXAMETHASONE 6 MG: 2 TABLET ORAL at 10:10

## 2024-09-15 ASSESSMENT — ACTIVITIES OF DAILY LIVING (ADL)
ADLS_ACUITY_SCORE: 43
ADLS_ACUITY_SCORE: 43
ADLS_ACUITY_SCORE: 41
ADLS_ACUITY_SCORE: 43
ADLS_ACUITY_SCORE: 41
ADLS_ACUITY_SCORE: 43
ADLS_ACUITY_SCORE: 41

## 2024-09-15 NOTE — PROGRESS NOTES
Respiratory Care has inspected patient owned home CPAP/BiPAP device and has found it to be in working order including a grounded plug in.  Patient is able to don and doff interface as well as turn equipment on and off.  RT has instructed pt to ask nursing or RT for distilled or sterile water when required.  RT has documented in flowsheet and Education tab as well.     Sulema Saunders, RT

## 2024-09-15 NOTE — PLAN OF CARE
"Goal Outcome Evaluation:  VSS, afebrile. Home CPAP on throughout night. Lung sounds diminished. Trace edema in feet/ ankles bilaterally. Assist of 1 to bathroom.       Plan of Care Reviewed With: patient    Overall Patient Progress: improvingOverall Patient Progress: improving    Outcome Evaluation: VSS afebrile    Blood pressure (!) 158/43, pulse 65, temperature 98.6  F (37  C), temperature source Tympanic, resp. rate 20, height 1.727 m (5' 8\"), weight 111.4 kg (245 lb 11.2 oz), SpO2 94%.     Litzy Castillo RN on 9/15/2024 at 3:00 AM   "

## 2024-09-15 NOTE — PROGRESS NOTES
09/15/24 1050   Appointment Info   Signing Clinician's Name / Credentials (OT) Sulema Smith, OTR/L   Living Environment   People in Home child(melodie), adult;spouse   Current Living Arrangements house   Home Accessibility no concerns   Self-Care   Usual Activity Tolerance moderate   Current Activity Tolerance moderate   Equipment Currently Used at Home cane, straight   Cognitive Status Examination   Orientation Status orientation to person, place and time   Affect/Mental Status (Cognitive) WFL   Follows Commands WFL   Pain Assessment   Patient Currently in Pain No   Range of Motion Comprehensive   General Range of Motion bilateral upper extremity ROM WFL   Coordination   Upper Extremity Coordination No deficits were identified   Bed Mobility   Bed Mobility supine-sit   Supine-Sit [place] (Bed Mobility) supervision   Transfers   Transfers bed-chair transfer   Transfer Skill: Bed to Chair/Chair to Bed   Bed-Chair [place] (Transfers) supervision   Clinical Impression   Criteria for Skilled Therapeutic Interventions Met (OT) Evaluation only   OT Diagnosis covid/weakness   Clinical Decision Making Complexity (OT) problem focused assessment/low complexity   Risk & Benefits of therapy have been explained risks/benefits reviewed   OT Total Evaluation Time   OT Eval, Low Complexity Minutes (15832) 15   OT Goals   Therapy Frequency (OT) One time eval and treatment   Interventions   Interventions Quick Adds Self-Care/Home Management;Therapeutic Activity   Therapeutic Activities   Therapeutic Activity Minutes (38658) 25   Treatment Detail/Skilled Intervention Pt ambulated around room and BR with SBA, reports feeling much better today and feels ready to go home   OT Discharge Planning   OT Plan d/c home today   OT Discharge Recommendation (DC Rec) home with assist   OT Rationale for DC Rec Pt has no further OT needs upon D/C   OT Brief overview of current status Pt doing very well today, reports feeling at or near  baseline and ready to D/C home   Total Session Time   Timed Code Treatment Minutes 25   Total Session Time (sum of timed and untimed services) 40

## 2024-09-15 NOTE — DISCHARGE SUMMARY
[facility]    Discharge Summary  Hospitalist    Date of Admission:  9/14/2024  Date of Discharge:  9/15/2024  Discharging Provider: Antolin Mendez MD  Date of Service (when I saw the patient): 09/15/24    Discharge Diagnoses   Principal Problem:    COVID-19    Date Noted: 9/14/2024  Active Problems:    Type 2 diabetes mellitus without complication (H)    Date Noted: 4/30/2012    Sleep apnea    Date Noted: 8/5/2015    HTN (hypertension)    Date Noted: 6/9/2004    General weakness    Date Noted: 9/14/2024    Chronic diastolic heart failure (H)    Date Noted: 8/1/2024    Chronic kidney disease, stage III (moderate) (H)    Date Noted: 11/17/2015    Chronic obstructive pulmonary disease, unspecified (H)    Date Noted: 8/1/2024    Coronary artery disease involving native coronary artery of native heart without angina pectoris    Date Noted: 1/19/2016      History of Present Illness   Jasson Dougherty is an 84 year old male history of insulin-dependent type 2 diabetes, CAD, DAKOTA, essential hypertension, chronic diastolic CHF and COPD who presents with COVID-19 infection and acute renal failure.     Patient was treated for bronchitis with a course of azithromycin/cefdinir on 7/31, he fully recovered and was seen by his PCP in follow-up on 8/6.  Since he by cardiology on 8/7 and note reviewed, no new medication changes.     Patient had a known COVID contact 5 days ago, her last 24 hours has developed myalgias, low-grade fevers and significant weakness and a dry nonproductive cough at home.  He was so weak EMS was called for lift assist, he was noted to be hypotensive, brought to the ER.     In the ER he was noted to have systolics in the 80s and 90s, was given 500 mL fluid bolus, IV Toradol, COVID PCR positive and he was subsequently admitted for further management.    Hospital Course   Jasson Dougherty was admitted on 9/14/2024.  The following problems were addressed during his hospitalization:       COVID-19  PCR positive, significant elevation of inflammatory markers, had a brief episode of supplemental oxygen requirement but persistent acute hypoxic respiratory failure was ruled out.  He was initially started on treatment with Decadron as his initial renal function precluded Paxlovid use.  He rapidly improved in regards to his appetite energy strength and mobility, was able to tolerate a regular consistency diet, had no persistent supplemental oxygen requirement during his stay except when sleeping from known DAKOTA and he has been compliant with his home CPAP.    He was not started on empiric antibiotics, with gentle IV fluids his renal function returned to his baseline and he was able to resume his home blood pressure medications without difficulty.  He will follow-up with his primary care provider on an as-needed basis for routine posthospital follow-up.         HTN (hypertension): Stable, initially hypovolemic likely from poor oral intake, no evidence of bacterial sepsis playing a role, his home blood pressure regimen was held on day of admit but able to resume on day of discharge with no changes.    CKD stage III: Acute renal failure on admit with creatinine of 2.3, improved to 1.5 with IV fluids overnight and likely prerenal.  Baseline creatinine around 1.2.  Consider repeat BMP at time of PCP follow-up to ensure stability.     Chronic diastolic heart failure (H): Stable but evidence of exacerbation, mild BNP elevation but no hypervolemia on exam.  He will resume his home diuretic regimen day after discharge.      Chronic obstructive pulmonary disease, unspecified (H): Chronic and stable throughout his stay without evidence of exacerbation.       Coronary artery disease involving native coronary artery of native heart without angina pectoris: Chronic and stable, he will resume his home optimize medical management with dual antiplatelet, beta-blocker, Entresto and statins      Type 2 diabetes mellitus  without complication (H): Well-controlled with last hemoglobin A1c of 7.0.  Will resume his home insulin regimen given his normalization and oral intake.  He will continue to check his blood sugars ACHS he did not need any refills of his diabetic supplies.       Sleep apnea: Chronic and compliant with home CPAP with 1.5 liter flow and he will continue with this.       General weakness: Definitely improved with rehydration and resolving COVID-19 symptoms.  Seen and evaluated by physical Occupational Therapy and he opted for addition of outpatient physical therapy for ongoing mobility and range of motion.     Antolin Mendez MD    Significant Results and Procedures none    Pending Results   These results will be followed up by pcp  Unresulted Labs Ordered in the Past 30 Days of this Admission       Date and Time Order Name Status Description    9/14/2024  7:25 AM Blood Culture Peripheral Blood Preliminary             Code Status   Full Code       Primary Care Physician   Physician No Ref-Primary    Physical Exam   Temp: 98.7  F (37.1  C) Temp src: Tympanic BP: (!) 150/45 Pulse: 56   Resp: 20 SpO2: 91 % O2 Device: None (Room air) Oxygen Delivery: 1.5 LPM  Vitals:    09/14/24 0349 09/14/24 0719   Weight: 110.7 kg (244 lb) 111.4 kg (245 lb 11.2 oz)     Vital Signs with Ranges  Temp:  [98.6  F (37  C)-98.8  F (37.1  C)] 98.7  F (37.1  C)  Pulse:  [52-94] 56  Resp:  [20] 20  BP: (124-158)/(43-62) 150/45  SpO2:  [90 %-94 %] 91 %  I/O last 3 completed shifts:  In: 2616 [P.O.:240; I.V.:2376]  Out: 700 [Urine:700]    GENERAL: Talkative, laying in bed, pleasant and appropriate, in no apparent distress.  Able to get to the side of the bed without difficulty and independently.  CARDIOVASCULAR: regular rate and rhythm, no murmurs, rubs, or gallops. Normal S1/S2. No lower extremity edema.   RESPIRATORY: clear to auscultation bilaterally, no wheezes, no crackles.   GI: Obese, soft, non-tender, non-distended, normoactive bowel  sounds.  MUSCULOSKELETAL: warm and well perfused, 2+ dorsalis pedis pulses bilaterally.    SKIN: no pallor,jaundice, or rashes.    Discharge Disposition   Discharged to home  Condition at discharge: Stable    Consultations This Hospital Stay   PHYSICAL THERAPY ADULT IP CONSULT  OCCUPATIONAL THERAPY ADULT IP CONSULT    Time Spent on this Encounter   IAntolin MD, personally saw the patient today and spent greater than 30 minutes discharging this patient.    Discharge Orders      Physical Therapy  Referral      Reason for your hospital stay    Covid pneumonia     Follow-up and recommended labs and tests     Follow up with primary care provider, Physician No Ref-Primary, within 7 days for hospital follow- up.  No follow up labs or test are needed.     Activity    Your activity upon discharge: activity as tolerated     Monitor and record    Blood glucose: 4 times a day, before meals and at bedtime.     Discharge Instructions    - There are no changes to your medications at this time     Diet    Follow this diet upon discharge: Current Diet:Orders Placed This Encounter      Regular Diet Adult low in sodium (less than 2000mg daily)     Discharge Medications   Current Discharge Medication List        CONTINUE these medications which have NOT CHANGED    Details   acetaminophen (TYLENOL) 500 MG tablet Take 1,000 mg by mouth 2 times daily      amLODIPine (NORVASC) 5 MG tablet Take 5 mg by mouth daily.      aspirin EC 81 MG EC tablet Take 81 mg by mouth daily with food      carvedilol (COREG) 6.25 MG tablet Take 6.25 mg by mouth 2 times daily (with meals).      clopidogrel (PLAVIX) 75 MG tablet Take 75 mg by mouth daily.      furosemide (LASIX) 40 MG tablet Take 40 mg by mouth daily.      gabapentin (NEURONTIN) 300 MG capsule Take 900 mg by mouth 2 times daily      insulin aspart (NOVOLOG PEN) 100 UNIT/ML injection Inject 20 Units Subcutaneous 3 times daily (with meals) Give within 15 minutes before or after  meals/snacks.      insulin glargine (LANTUS PEN) 100 UNIT/ML pen Inject 36 Units subcutaneously 2 times daily.      nitroGLYcerin (NITROSTAT) 0.4 MG sublingual tablet Place 0.4 mg under the tongue every 5 minutes as needed for chest pain For chest pain place 1 tablet under the tongue every 5 minutes for 3 doses. If symptoms persist 5 minutes after 1st dose call 911.      omeprazole 20 MG tablet Take 20 mg by mouth daily      rosuvastatin (CRESTOR) 40 MG tablet Take 40 mg by mouth daily.      sacubitril-valsartan (ENTRESTO)  MG per tablet Take 1 tablet by mouth 2 times daily.      spironolactone (ALDACTONE) 25 MG tablet Take 25 mg by mouth daily.      tamsulosin (FLOMAX) 0.4 MG capsule Take 0.8 mg by mouth daily.      vitamin B-12 (CYANOCOBALAMIN) 500 MCG tablet Take 1 tablet by mouth daily.      Vitamin D, Cholecalciferol, 25 MCG (1000 UT) TABS Take 1 tablet by mouth daily.      blood glucose (NO BRAND SPECIFIED) test strip check sugars once daily before meals      !! Blood Glucose Monitoring Suppl (FIFTY50 GLUCOSE METER 2.0) W/DEVICE KIT       !! Blood Glucose Monitoring Suppl (ONE TOUCH BASIC SYSTEM) W/DEVICE KIT       insulin pen needle 32G X 8  each      !! lancets 28G MISC       !! ONE TOUCH LANCETS MISC        !! - Potential duplicate medications found. Please discuss with provider.        Allergies   Allergies   Allergen Reactions    Penicillins Rash and Unknown     Data   Most Recent 3 CBC's:  Recent Labs   Lab Test 09/15/24  0557 09/14/24 0432 09/21/23  0056   WBC 4.8 5.0 11.3*   HGB 11.6* 11.6* 13.1*   MCV 96 99 91    156 208      Most Recent 3 BMP's:  Recent Labs   Lab Test 09/15/24  0822 09/15/24  0557 09/15/24  0219 09/14/24  0743 09/14/24  0432 09/20/23  1822   NA  --  139  --   --  137 141   POTASSIUM  --  5.3  --   --  5.1 4.2   CHLORIDE  --  107  --   --  101 102   CO2  --  20*  --   --  22 26   BUN  --  33.7*  --   --  40.8* 18.1   CR  --  1.53*  --   --  2.32* 1.12   ANIONGAP   --  12  --   --  14 13   YOVANA  --  8.3*  --   --  8.3* 9.1   * 219* 199*   < > 166* 97    < > = values in this interval not displayed.     Most Recent 2 LFT's:  Recent Labs   Lab Test 09/20/23 1822 11/30/22  0315   AST 17 14   ALT 11 20   ALKPHOS 77 76   BILITOTAL 0.3 0.3     Most Recent INR's and Anticoagulation Dosing History:  Anticoagulation Dose History          Latest Ref Rng & Units 11/30/2022 2/13/2023   Recent Dosing and Labs   INR 0.85 - 1.15 1.14  1.03       Details                 Most Recent 3 Troponin's:No lab results found.  Most Recent Cholesterol Panel:No lab results found.  Most Recent 6 Bacteria Isolates From Any Culture (See EPIC Reports for Culture Details):No lab results found.  Most Recent TSH, T4 and A1c Labs:  Recent Labs   Lab Test 09/20/23 1822   TSH 1.77     Results for orders placed or performed during the hospital encounter of 09/14/24   XR Chest Port 1 View    Narrative    PROCEDURE INFORMATION:   Exam: XR Chest   Exam date and time: 9/14/2024 4:19 AM   Age: 84 years old   Clinical indication: Cough and fever; Patient HX: Covid+; Additional info:   Cough, fever     TECHNIQUE:   Imaging protocol: Radiologic exam of the chest.   Views: 1 view.     COMPARISON:   CT CHEST PULMONARY EMBOLISM W CONTRAST 9/20/2023 7:41 PM     FINDINGS:    Lungs: Chronic appearing increased interstitial markings noted. Lungs   otherwise clear.    Pleural spaces: No definite pneumothorax. No pleural effusion.    Heart/Mediastinum: Cardiac silhouette mildly enlarged, and pulmonary   vasculature within normal limits.    Bones/Joints: Osseous structures unchanged.       Impression    IMPRESSION:   No focal consolidation/pneumonia.     THIS DOCUMENT HAS BEEN ELECTRONICALLY SIGNED BY JAMES GRIMES MD

## 2024-09-15 NOTE — PROGRESS NOTES
09/15/24 1122   Appointment Info   Signing Clinician's Name / Credentials (PT) Malik Rayo MPT   Living Environment   People in Home child(melodie), adult;spouse   Current Living Arrangements house   Home Accessibility no concerns   Transportation Anticipated agency   Self-Care   Usual Activity Tolerance moderate   Current Activity Tolerance moderate   Equipment Currently Used at Home cane, straight;walker, rolling   Fall history within last six months yes   Number of times patient has fallen within last six months 1   General Information   Referring Physician Andrea   Patient/Family Therapy Goals Statement (PT) return home   Existing Precautions/Restrictions fall  (Covid-19)   Weight-Bearing Status - LLE full weight-bearing   Weight-Bearing Status - RLE full weight-bearing   Cognition   Affect/Mental Status (Cognition) WFL   Orientation Status (Cognition) oriented x 4   Follows Commands (Cognition) WFL   Pain Assessment   Patient Currently in Pain No   Integumentary/Edema   Integumentary/Edema Comments mild ankle/foot edema noted   Posture    Posture Not impaired   Range of Motion (ROM)   Range of Motion ROM is WFL   Strength (Manual Muscle Testing)   Strength (Manual Muscle Testing) strength is WFL   Bed Mobility   Comment, (Bed Mobility) SBA   Transfers   Comment, (Transfers) sit to stand and stand pivot transfers with SBA with and without use of assistive gait device   Gait/Stairs (Locomotion)   Isabella Level (Gait) supervision   Assistive Device (Gait) other (see comments)  (SE cane or Fww)   Distance in Feet (Gait) ambulation within patient's room   Pattern (Gait) step-through   Balance   Balance Comments good with AD   Sensory Examination   Sensory Perception WFL   Coordination   Coordination no deficits were identified   Muscle Tone   Muscle Tone no deficits were identified   Clinical Impression   Criteria for Skilled Therapeutic Intervention Evaluation only   PT Diagnosis (PT) impaired mobility    Influenced by the following impairments fatigue   Functional limitations due to impairments decreased activity/gait tolerance   Clinical Presentation (PT Evaluation Complexity) stable   Clinical Decision Making (Complexity) low complexity   Risk & Benefits of therapy have been explained evaluation/treatment results reviewed;risks/benefits reviewed;patient   PT Total Evaluation Time   PT Eval, Low Complexity Minutes (94769) 15   Physical Therapy Goals   PT Frequency One time eval and treatment only   PT Discharge Planning   PT Plan Patient discharging   PT Discharge Recommendation (DC Rec) home with assist;home with outpatient physical therapy   PT Rationale for DC Rec to promote strength, stability and safe mobility   PT Brief overview of current status Pleasant patient demonstrating safe functional mobility; patient appears with increased gait stability with use of assistive device (Fww or SE cane which he has at home); patient states that he will decide if he needs additonal PT through outpatient services, when he returns home   PT Equipment Needed at Discharge cane, straight;walker, rolling   Total Session Time   Total Session Time (sum of timed and untimed services) 15

## 2024-09-15 NOTE — PHARMACY - DISCHARGE MEDICATION RECONCILIATION AND EDUCATION
Pharmacy:  Discharge Counseling and Medication Reconciliation    Jasson Dougherty  [address]  [facility] [address]  [phone] (home)   84 year old male  PCP: No Ref-Primary, Physician    Allergies: Penicillins    Discharge Counseling:    Pharmacist did not meet with patient (and/or family) today as patient is COVID+ and no medication changes were made. Patient to be instructed to contact pharmacy if and questions arise. Medlist Up to date per Wife.       Materials Provided:  MedCounselor sheets printed from Clinical Pharmacology on: None    Discharge Medication Reconciliation:    It has been determined that the patient has an adequate supply of medications available or which can be obtained from the patient's preferred pharmacy, which HE/SHE has confirmed as: [facility] ([facility]?)     Thank you for the consult.    Diego Vásquez [facility]........September 15, 2024 9:58 AM       Spoke to pt to offer him a sooner appt divya Sharp in [place]. Pt declined.

## 2024-09-15 NOTE — PROGRESS NOTES
SAFETY CHECKLIST  ID Bands and Risk clasps correct and in place (DNR, Fall risk, Allergy, Latex, Limb):  Yes  All Lines Reconciled and labeled correctly: Yes  Whiteboard updated:Yes  Environmental interventions: Yes  Verify Tele #:  JOLIE Lindquist RN on 9/15/2024 at 7:05 AM

## 2024-09-15 NOTE — PHARMACY-ADMISSION MEDICATION HISTORY
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)    CVA, old, monoplegia upper limb    Malignant neoplasm of descending colon (H)    Primary osteoarthritis of left wrist    Right shoulder pain    Stenosis of right carotid artery    Influenza A    Acute respiratory failure with hypoxia (H)    General weakness    COVID-19    Chronic diastolic heart failure (H)    Chronic kidney disease, stage III (moderate) (H)    Chronic left shoulder pain    Carotid artery plaque, bilateral    Chronic obstructive pulmonary disease, unspecified (H)    Coronary artery disease involving native coronary artery of native heart without angina pectoris    PAD (peripheral artery disease) (H24)    S/P carotid endarterectomy        Relevant Labs:  Recent Labs   Lab Test 09/15/24  0557 09/14/24  0432   WBC 4.8 5.0   HGB 11.6* 11.6*    156        CrCl: 42mL/min and improving      Intake/Output Summary (Last 24 hours) at 9/15/2024 0750  Last data filed at 9/15/2024 0441  Gross per 24 hour   Intake 2616 ml   Output 700 ml   Net 1916 ml          Per Renal Dose Adjustment Protocol, will adjust:  Lovenox 30mg Q24hr -> 40mg q24hr      Will continue to follow and make adjustments accordingly. Thank You.    Diego Vásquez [facility] ....................  9/15/2024   7:50 AM

## 2024-09-15 NOTE — PROGRESS NOTES
SAFETY CHECKLIST  ID Bands and Risk clasps correct and in place (DNR, Fall risk, Allergy, Latex, Limb):  Yes  All Lines Reconciled and labeled correctly: Yes  Whiteboard updated:Yes  Environmental interventions: Yes  Verify Tele #:  JOLIE Castillo RN on 9/14/2024 at 7:26 PM

## 2024-09-16 ENCOUNTER — PATIENT OUTREACH (OUTPATIENT)
Dept: FAMILY MEDICINE | Facility: OTHER | Age: 84
End: 2024-09-16

## 2024-09-16 NOTE — TELEPHONE ENCOUNTER
Patient has PCP elsewhere, no follow-up here. No TCM call required per policy.  Jessika Oscar RN on 9/16/2024 at 11:10 AM

## 2024-09-19 LAB — BACTERIA BLD CULT: NO GROWTH

## 2024-09-25 ENCOUNTER — APPOINTMENT (OUTPATIENT)
Dept: MRI IMAGING | Facility: OTHER | Age: 84
End: 2024-09-25
Payer: COMMERCIAL

## 2024-09-25 ENCOUNTER — APPOINTMENT (OUTPATIENT)
Dept: CT IMAGING | Facility: OTHER | Age: 84
End: 2024-09-25
Payer: COMMERCIAL

## 2024-09-25 ENCOUNTER — APPOINTMENT (OUTPATIENT)
Dept: CARDIOLOGY | Facility: OTHER | Age: 84
End: 2024-09-25
Payer: COMMERCIAL

## 2024-09-25 ENCOUNTER — HOSPITAL ENCOUNTER (EMERGENCY)
Facility: OTHER | Age: 84
Discharge: HOME | End: 2024-09-25
Payer: COMMERCIAL

## 2024-09-25 ENCOUNTER — APPOINTMENT (OUTPATIENT)
Dept: GENERAL RADIOLOGY | Facility: OTHER | Age: 84
End: 2024-09-25
Payer: COMMERCIAL

## 2024-09-25 VITALS
HEART RATE: 51 BPM | OXYGEN SATURATION: 93 % | RESPIRATION RATE: 21 BRPM | SYSTOLIC BLOOD PRESSURE: 159 MMHG | BODY MASS INDEX: 35.57 KG/M2 | HEIGHT: 68 IN | DIASTOLIC BLOOD PRESSURE: 55 MMHG | TEMPERATURE: 98.5 F | WEIGHT: 234.7 LBS

## 2024-09-25 DIAGNOSIS — R53.1 GENERALIZED WEAKNESS: ICD-10-CM

## 2024-09-25 DIAGNOSIS — I25.10 CORONARY ARTERY DISEASE INVOLVING NATIVE CORONARY ARTERY OF NATIVE HEART WITHOUT ANGINA PECTORIS: ICD-10-CM

## 2024-09-25 DIAGNOSIS — R42 DIZZINESS: ICD-10-CM

## 2024-09-25 DIAGNOSIS — E83.42 HYPOMAGNESEMIA: ICD-10-CM

## 2024-09-25 DIAGNOSIS — N18.30 CHRONIC KIDNEY DISEASE, STAGE III (MODERATE) (H): ICD-10-CM

## 2024-09-25 LAB
ALBUMIN UR-MCNC: 30 MG/DL
ANION GAP SERPL CALCULATED.3IONS-SCNC: 11 MMOL/L (ref 7–15)
APPEARANCE UR: CLEAR
APTT PPP: 27 SECONDS (ref 22–38)
BASOPHILS # BLD AUTO: 0 10E3/UL (ref 0–0.2)
BASOPHILS NFR BLD AUTO: 0 %
BILIRUB UR QL STRIP: NEGATIVE
BUN SERPL-MCNC: 31.4 MG/DL (ref 8–23)
CALCIUM SERPL-MCNC: 8.2 MG/DL (ref 8.8–10.4)
CHLORIDE SERPL-SCNC: 104 MMOL/L (ref 98–107)
COLOR UR AUTO: ABNORMAL
CREAT SERPL-MCNC: 1.9 MG/DL (ref 0.67–1.17)
EGFRCR SERPLBLD CKD-EPI 2021: 34 ML/MIN/1.73M2
EOSINOPHIL # BLD AUTO: 0.1 10E3/UL (ref 0–0.7)
EOSINOPHIL NFR BLD AUTO: 3 %
ERYTHROCYTE [DISTWIDTH] IN BLOOD BY AUTOMATED COUNT: 13.2 % (ref 10–15)
GLUCOSE BLDC GLUCOMTR-MCNC: 158 MG/DL (ref 70–99)
GLUCOSE SERPL-MCNC: 149 MG/DL (ref 70–99)
GLUCOSE UR STRIP-MCNC: NEGATIVE MG/DL
HCO3 SERPL-SCNC: 25 MMOL/L (ref 22–29)
HCT VFR BLD AUTO: 35.8 % (ref 40–53)
HGB BLD-MCNC: 11.3 G/DL (ref 13.3–17.7)
HGB UR QL STRIP: NEGATIVE
HYALINE CASTS: 1 /LPF
IMM GRANULOCYTES # BLD: 0.1 10E3/UL
IMM GRANULOCYTES NFR BLD: 1 %
INR PPP: 0.94 (ref 0.85–1.15)
KETONES UR STRIP-MCNC: NEGATIVE MG/DL
LACTATE SERPL-SCNC: 1.6 MMOL/L (ref 0.7–2)
LEUKOCYTE ESTERASE UR QL STRIP: NEGATIVE
LVEF ECHO: NORMAL
LYMPHOCYTES # BLD AUTO: 0.6 10E3/UL (ref 0.8–5.3)
LYMPHOCYTES NFR BLD AUTO: 13 %
MAGNESIUM SERPL-MCNC: 1.4 MG/DL (ref 1.7–2.3)
MCH RBC QN AUTO: 30.1 PG (ref 26.5–33)
MCHC RBC AUTO-ENTMCNC: 31.6 G/DL (ref 31.5–36.5)
MCV RBC AUTO: 96 FL (ref 78–100)
MONOCYTES # BLD AUTO: 0.4 10E3/UL (ref 0–1.3)
MONOCYTES NFR BLD AUTO: 7 %
MUCOUS THREADS #/AREA URNS LPF: PRESENT /LPF
NEUTROPHILS # BLD AUTO: 3.7 10E3/UL (ref 1.6–8.3)
NEUTROPHILS NFR BLD AUTO: 76 %
NITRATE UR QL: NEGATIVE
NRBC # BLD AUTO: 0 10E3/UL
NRBC BLD AUTO-RTO: 0 /100
PH UR STRIP: 5.5 [PH] (ref 5–9)
PLATELET # BLD AUTO: 246 10E3/UL (ref 150–450)
POTASSIUM SERPL-SCNC: 4.8 MMOL/L (ref 3.4–5.3)
RBC # BLD AUTO: 3.75 10E6/UL (ref 4.4–5.9)
RBC URINE: 1 /HPF
SODIUM SERPL-SCNC: 140 MMOL/L (ref 135–145)
SP GR UR STRIP: 1.03 (ref 1–1.03)
TROPONIN T SERPL HS-MCNC: 39 NG/L
TROPONIN T SERPL HS-MCNC: 45 NG/L
UROBILINOGEN UR STRIP-MCNC: NORMAL MG/DL
WBC # BLD AUTO: 4.9 10E3/UL (ref 4–11)
WBC URINE: 1 /HPF

## 2024-09-25 PROCEDURE — 250N000011 HC RX IP 250 OP 636

## 2024-09-25 PROCEDURE — 93306 TTE W/DOPPLER COMPLETE: CPT | Mod: 26 | Performed by: INTERNAL MEDICINE

## 2024-09-25 PROCEDURE — 83605 ASSAY OF LACTIC ACID: CPT

## 2024-09-25 PROCEDURE — 36415 COLL VENOUS BLD VENIPUNCTURE: CPT

## 2024-09-25 PROCEDURE — 96365 THER/PROPH/DIAG IV INF INIT: CPT | Mod: XU

## 2024-09-25 PROCEDURE — 85004 AUTOMATED DIFF WBC COUNT: CPT

## 2024-09-25 PROCEDURE — 70450 CT HEAD/BRAIN W/O DYE: CPT

## 2024-09-25 PROCEDURE — 85610 PROTHROMBIN TIME: CPT

## 2024-09-25 PROCEDURE — 93010 ELECTROCARDIOGRAM REPORT: CPT | Performed by: STUDENT IN AN ORGANIZED HEALTH CARE EDUCATION/TRAINING PROGRAM

## 2024-09-25 PROCEDURE — 255N000002 HC RX 255 OP 636

## 2024-09-25 PROCEDURE — 93306 TTE W/DOPPLER COMPLETE: CPT

## 2024-09-25 PROCEDURE — 96366 THER/PROPH/DIAG IV INF ADDON: CPT

## 2024-09-25 PROCEDURE — 258N000003 HC RX IP 258 OP 636

## 2024-09-25 PROCEDURE — 99284 EMERGENCY DEPT VISIT MOD MDM: CPT

## 2024-09-25 PROCEDURE — 70498 CT ANGIOGRAPHY NECK: CPT

## 2024-09-25 PROCEDURE — 83735 ASSAY OF MAGNESIUM: CPT

## 2024-09-25 PROCEDURE — 99291 CRITICAL CARE FIRST HOUR: CPT | Mod: 25

## 2024-09-25 PROCEDURE — 250N000009 HC RX 250

## 2024-09-25 PROCEDURE — 99207 PR NO CHARGE LOS: CPT

## 2024-09-25 PROCEDURE — A9575 INJ GADOTERATE MEGLUMI 0.1ML: HCPCS

## 2024-09-25 PROCEDURE — 93005 ELECTROCARDIOGRAM TRACING: CPT

## 2024-09-25 PROCEDURE — 84484 ASSAY OF TROPONIN QUANT: CPT

## 2024-09-25 PROCEDURE — 85730 THROMBOPLASTIN TIME PARTIAL: CPT

## 2024-09-25 PROCEDURE — 81001 URINALYSIS AUTO W/SCOPE: CPT

## 2024-09-25 PROCEDURE — 70553 MRI BRAIN STEM W/O & W/DYE: CPT

## 2024-09-25 PROCEDURE — 0042T CT HEAD PERFUSION W CONTRAST: CPT

## 2024-09-25 PROCEDURE — 71046 X-RAY EXAM CHEST 2 VIEWS: CPT

## 2024-09-25 PROCEDURE — 80048 BASIC METABOLIC PNL TOTAL CA: CPT

## 2024-09-25 PROCEDURE — 70496 CT ANGIOGRAPHY HEAD: CPT

## 2024-09-25 PROCEDURE — 93005 ELECTROCARDIOGRAM TRACING: CPT | Mod: 76

## 2024-09-25 PROCEDURE — 82962 GLUCOSE BLOOD TEST: CPT

## 2024-09-25 RX ORDER — GADOTERATE MEGLUMINE 376.9 MG/ML
20 INJECTION INTRAVENOUS ONCE
Status: COMPLETED | OUTPATIENT
Start: 2024-09-25 | End: 2024-09-25

## 2024-09-25 RX ORDER — IOPAMIDOL 755 MG/ML
117 INJECTION, SOLUTION INTRAVASCULAR ONCE
Status: COMPLETED | OUTPATIENT
Start: 2024-09-25 | End: 2024-09-25

## 2024-09-25 RX ORDER — MAGNESIUM SULFATE HEPTAHYDRATE 40 MG/ML
4 INJECTION, SOLUTION INTRAVENOUS ONCE
Status: COMPLETED | OUTPATIENT
Start: 2024-09-25 | End: 2024-09-25

## 2024-09-25 RX ADMIN — GADOTERATE MEGLUMINE 20 ML: 376.9 INJECTION INTRAVENOUS at 13:34

## 2024-09-25 RX ADMIN — SODIUM CHLORIDE 140 ML: 9 INJECTION, SOLUTION INTRAVENOUS at 11:58

## 2024-09-25 RX ADMIN — IOPAMIDOL 115 ML: 755 INJECTION, SOLUTION INTRAVENOUS at 11:58

## 2024-09-25 RX ADMIN — MAGNESIUM SULFATE HEPTAHYDRATE 4 G: 40 INJECTION, SOLUTION INTRAVENOUS at 14:28

## 2024-09-25 RX ADMIN — SODIUM CHLORIDE 500 ML: 9 INJECTION, SOLUTION INTRAVENOUS at 14:26

## 2024-09-25 ASSESSMENT — ACTIVITIES OF DAILY LIVING (ADL)
ADLS_ACUITY_SCORE: 40

## 2024-09-25 ASSESSMENT — ENCOUNTER SYMPTOMS
APPETITE CHANGE: 1
MYALGIAS: 0
FEVER: 0
COUGH: 1
DIZZINESS: 1
SHORTNESS OF BREATH: 0
DIARRHEA: 0
WEAKNESS: 1
NAUSEA: 0
VOMITING: 0
HEADACHES: 0
PALPITATIONS: 0

## 2024-09-25 NOTE — PROGRESS NOTES
"Brief Stroke Note:     MRI brain negative for acute pathology, no further stroke evaluation indicated at this time.    MEGA Barber, CNP  Neurology  09/25/2024 5:21 PM  To page stroke neurology after hours or on a subsequent day, click here: [facility]  Choose \"On Call\" tab at top, then search dropdown box for \"Neurology Adult\" & press Enter, look for Neuro ICU/Stroke       "

## 2024-09-25 NOTE — ED PROVIDER NOTES
History     Chief Complaint   Patient presents with    Dizziness     The history is provided by the patient, the spouse and medical records.     Jasson Dougherty is a 84 year old male who presents to the ER today with family, wife, son, who he resides with. Patient woke up this morning around [id number] am and was feeling dizzy, weak getting out of bed. He is feeling dizzy with position changes and both of his legs were feeling weak. He tells me he was up at 0300 and was feeling normal, was not feeling weak or dizzy.  He has been dealing with COVID for the past 10 days, has had a cough, decreased appetite.   History past stroke with left sided arm, leg weakness residual per patient report.     Recently admitted (observation) for covid, generalized weakness 9/14- discharged with physical therapy/occupational therapy    Reviewed cardiology clinic notes from August, patient has a history of feeling dizzy with position changes thought likely due to his multiple blood pressure medications.     History of medically managed STEMI (2016), abnormal stress test, coronary angiography in February revealing  dRCA , 90% LCX with LVEDP 25 mmHg at weight of 244 lbs . His intracardiac pressures were 212 systolic. Recommendations for medical management.     Transthoracic ECHO in June 2024 ([facility]): Interpretation Summary   A two-dimensional transthoracic echocardiogram with color flow and Doppler was performed.   Technically difficult study.   The left ventricle is normal size.   The left ventricular systolic function is normal.   Qualitative ejection fraction is 55-60% (normal).   Unable to exclude or confirm wall motion abnormality.   There is borderline concentric hypertrophy.   The right ventricular cavity size is qualitatively normal.   Normal right ventricular systolic function.   Left atrium is qualitatively enlarged.   The aortic valve is not well visualized and the cusp number cannot accurately be determined.   The  aortic valve is mildly sclerotic.   Trace aortic insufficiency.   There is mild mitral regurgitation.   Trace or physiologic tricuspid regurgitation.   Inferior vena cava size normal and collapsibility > 50% normal indicating normal right atrial pressure (3 mm Hg).   TR signal inadequate to allow accurate estimate RV systolic pressure.     PMH diastolic HF, COPD, CVA, CAD, HTN, CKD, sleep apnea, T2DM, STEMI (2016) and NSTEMI (2023)    Allergies:  Allergies   Allergen Reactions    Amlodipine Other (See Comments) and Swelling    Penicillin G     Penicillins Rash and Unknown       Problem List:    Patient Active Problem List    Diagnosis Date Noted    General weakness 09/14/2024     Priority: Medium    COVID-19 09/14/2024     Priority: Medium    Chronic diastolic heart failure (H) 08/01/2024     Priority: Medium    Chronic obstructive pulmonary disease, unspecified (H) 08/01/2024     Priority: Medium    PAD (peripheral artery disease) (H24) 02/14/2023     Priority: Medium     Formatting of this note might be different from the original.   Added automatically from request for surgery [id number]      Influenza A 11/30/2022     Priority: Medium    Acute respiratory failure with hypoxia (H) 11/30/2022     Priority: Medium    Chronic left shoulder pain 06/15/2021     Priority: Medium    Right shoulder pain 06/01/2016     Priority: Medium    Primary osteoarthritis of left wrist 04/12/2016     Priority: Medium    S/P carotid endarterectomy 02/04/2016     Priority: Medium    Carotid artery plaque, bilateral 01/19/2016     Priority: Medium    Coronary artery disease involving native coronary artery of native heart without angina pectoris 01/19/2016     Priority: Medium    Carotid stenosis, right 01/11/2016     Priority: Medium    Cerebrovascular accident (CVA) due to embolism of right middle cerebral artery (H) 01/11/2016     Priority: Medium    CVA, old, monoplegia upper limb 12/23/2015     Priority: Medium    Malignant neoplasm  of descending colon (H) 2015     Priority: Medium    Stenosis of right carotid artery 2015     Priority: Medium    Chronic kidney disease, stage III (moderate) (H) 2015     Priority: Medium    Sleep apnea 2015     Priority: Medium    Rectal cancer (H) 2015     Priority: Medium    Arthrodesis status 2015     Priority: Medium     Overview:   [facility]      Morbid obesity (H) 2015     Priority: Medium     Overview:   Complicated by severe DJD in back and LEs, Diabetes, D[place], GERD, Hypertension.      Malignant neoplasm of rectum (H) 2015     Priority: Medium    Type 2 diabetes mellitus without complication (H) 2012     Priority: Medium     Overview:   [facility]      Hypercholesteremia 2009     Priority: Medium     Overview:   IMO Update 10/11      Esophageal reflux 2004     Priority: Medium     Overview:   GORDON      HTN (hypertension) 2004     Priority: Medium     Overview:   HTN          Past Medical History:    No past medical history on file.    Past Surgical History:    Past Surgical History:   Procedure Laterality Date    APPENDECTOMY OPEN      No Comments Provided    BACK SURGERY      fusion    COLONOSCOPY          OTHER SURGICAL HISTORY      CHX232,HEMICOLECTOMY    OTHER SURGICAL HISTORY      SUR85,ANKLE ARTHROSCOPY       Family History:    No family history on file.    Social History:  Marital Status:   [2]  Social History     Tobacco Use    Smoking status: Former     Current packs/day: 0.00     Types: Cigarettes     Quit date: 1995     Years since quittin.7    Smokeless tobacco: Never   Substance Use Topics    Alcohol use: No     Alcohol/week: 0.0 standard drinks of alcohol    Drug use: Never     Comment: Drug use: No        Medications:    acetaminophen (TYLENOL) 500 MG tablet  amLODIPine (NORVASC) 5 MG tablet  aspirin EC 81 MG EC tablet  blood glucose (NO BRAND SPECIFIED) test  "strip  Blood Glucose Monitoring Suppl (FIFTY50 GLUCOSE METER 2.0) W/DEVICE KIT  Blood Glucose Monitoring Suppl (ONE TOUCH BASIC SYSTEM) W/DEVICE KIT  carvedilol (COREG) 6.25 MG tablet  clopidogrel (PLAVIX) 75 MG tablet  furosemide (LASIX) 40 MG tablet  gabapentin (NEURONTIN) 300 MG capsule  insulin aspart (NOVOLOG PEN) 100 UNIT/ML injection  insulin glargine (LANTUS PEN) 100 UNIT/ML pen  insulin pen needle 32G X 8 MM  lancets 28G MISC  nitroGLYcerin (NITROSTAT) 0.4 MG sublingual tablet  omeprazole 20 MG tablet  ONE TOUCH LANCETS MISC  rosuvastatin (CRESTOR) 40 MG tablet  sacubitril-valsartan (ENTRESTO)  MG per tablet  spironolactone (ALDACTONE) 25 MG tablet  tamsulosin (FLOMAX) 0.4 MG capsule  vitamin B-12 (CYANOCOBALAMIN) 500 MCG tablet  Vitamin D, Cholecalciferol, 25 MCG (1000 UT) TABS        Review of Systems   Constitutional:  Positive for appetite change. Negative for fever.   Respiratory:  Positive for cough. Negative for shortness of breath.    Cardiovascular:  Negative for chest pain and palpitations.   Gastrointestinal:  Negative for diarrhea, nausea and vomiting.   Musculoskeletal:  Negative for myalgias.   Neurological:  Positive for dizziness and weakness. Negative for headaches.   All other systems reviewed and are negative.  See HPI    Physical Exam   BP: 118/67  Pulse: 52  Temp: 98.5  F (36.9  C)  Resp: 18  Height: 172.7 cm (5' 8\")  Weight: 106.5 kg (234 lb 11.2 oz)  SpO2: 94 %      Physical Exam  Vitals and nursing note reviewed.   Constitutional:       General: He is not in acute distress.     Appearance: Normal appearance. He is not ill-appearing or toxic-appearing.   HENT:      Head: Normocephalic.      Nose: Nose normal.      Mouth/Throat:      Mouth: Mucous membranes are moist.   Eyes:      Extraocular Movements: Extraocular movements intact.      Pupils: Pupils are equal, round, and reactive to light.   Cardiovascular:      Rate and Rhythm: Regular rhythm. Bradycardia present.      " Pulses: Normal pulses.   Abdominal:      Palpations: Abdomen is soft.      Tenderness: There is no abdominal tenderness.   Musculoskeletal:         General: Normal range of motion.      Cervical back: Neck supple.      Right lower leg: No edema.      Left lower leg: No edema.   Skin:     General: Skin is warm.      Capillary Refill: Capillary refill takes less than 2 seconds.   Neurological:      General: No focal deficit present.      Mental Status: He is alert and oriented to person, place, and time.   Psychiatric:         Mood and Affect: Mood normal.         ED Course            EKG Interpretation:      Interpreted by MEGA Mejía CNP  Time reviewed: 1213  Symptoms at time of EKG: dizzy, stroke work up   Rhythm: sinus nathan  Rate: 53  Ectopy: none  Conduction: 1st degree AV block  ST Segments/ T Waves: T wave inversion 1, aVL, V5 and V6: No STEMI  Comparison to prior: New T wave inversion in lead I, aVL, V5, V6 compared to previous of September 2023- unable to see EKG from [facility]  Clinical Impression: as above  Pending  EKG over read by internal medicine     Repeat EKG 1718 similar to previous EKG from earlier today without changes.         Results for orders placed or performed during the hospital encounter of 09/25/24 (from the past 24 hour(s))   Glucose by meter   Result Value Ref Range    GLUCOSE BY METER POCT 158 (H) 70 - 99 mg/dL   CBC with Platelets & Differential    Narrative    The following orders were created for panel order CBC with Platelets & Differential.  Procedure                               Abnormality         Status                     ---------                               -----------         ------                     CBC with platelets and d...[[id number]]  Abnormal            Final result                 Please view results for these tests on the individual orders.   Basic metabolic panel   Result Value Ref Range    Sodium 140 135 - 145 mmol/L    Potassium 4.8 3.4 - 5.3 mmol/L     Chloride 104 98 - 107 mmol/L    Carbon Dioxide (CO2) 25 22 - 29 mmol/L    Anion Gap 11 7 - 15 mmol/L    Urea Nitrogen 31.4 (H) 8.0 - 23.0 mg/dL    Creatinine 1.90 (H) 0.67 - 1.17 mg/dL    GFR Estimate 34 (L) >60 mL/min/1.73m2    Calcium 8.2 (L) 8.8 - 10.4 mg/dL    Glucose 149 (H) 70 - 99 mg/dL   INR   Result Value Ref Range    INR 0.94 0.85 - 1.15   Troponin T, High Sensitivity   Result Value Ref Range    Troponin T, High Sensitivity 45 (H) <=22 ng/L   CBC with platelets and differential   Result Value Ref Range    WBC Count 4.9 4.0 - 11.0 10e3/uL    RBC Count 3.75 (L) 4.40 - 5.90 10e6/uL    Hemoglobin 11.3 (L) 13.3 - 17.7 g/dL    Hematocrit 35.8 (L) 40.0 - 53.0 %    MCV 96 78 - 100 fL    MCH 30.1 26.5 - 33.0 pg    MCHC 31.6 31.5 - 36.5 g/dL    RDW 13.2 10.0 - 15.0 %    Platelet Count 246 150 - 450 10e3/uL    % Neutrophils 76 %    % Lymphocytes 13 %    % Monocytes 7 %    % Eosinophils 3 %    % Basophils 0 %    % Immature Granulocytes 1 %    NRBCs per 100 WBC 0 <1 /100    Absolute Neutrophils 3.7 1.6 - 8.3 10e3/uL    Absolute Lymphocytes 0.6 (L) 0.8 - 5.3 10e3/uL    Absolute Monocytes 0.4 0.0 - 1.3 10e3/uL    Absolute Eosinophils 0.1 0.0 - 0.7 10e3/uL    Absolute Basophils 0.0 0.0 - 0.2 10e3/uL    Absolute Immature Granulocytes 0.1 <=0.4 10e3/uL    Absolute NRBCs 0.0 10e3/uL   Lactic acid whole blood with 1x repeat in 2 hr when >2   Result Value Ref Range    Lactic Acid, Initial 1.6 0.7 - 2.0 mmol/L   Magnesium   Result Value Ref Range    Magnesium 1.4 (L) 1.7 - 2.3 mg/dL   CT Head w/o Contrast    Narrative    EXAM: CT HEAD W/O CONTRAST, 9/25/2024 11:51 AM    HISTORY: Code Stroke to evaluate for potential thrombolysis and  thrombectomy. PLEASE READ IMMEDIATELY. Dizziness    COMPARISON: CT head 5/16/2022    TECHNIQUE:   Imaging protocol: Multiplanar CT images of the head without  intravenous contrast.   Acquisition: This CT exam was performed using one or more the  following dose reduction techniques: automated  exposure control,  adjustment of the mA and/or kV according to patient size, and/or  iterative reconstruction technique.    FINDINGS:  No intracranial hemorrhage, mass effect, or midline shift. The  ventricles are proportionate to the cerebral sulci. Patchy foci of  hypodensity in the periventricular and subcortical white matter, which  is nonspecific, likely related to chronic small vessel ischemic  disease given the patient's age. Chronic right MCA territory infarct  affecting the right frontal, parietal and occipital regions. No acute  loss of gray-white matter differentiation.    No acute osseous abnormality. Mild to moderate paranasal sinus mucosal  thickening. Mastoid air cells are clear. The orbits are grossly  unremarkable.       Impression    IMPRESSION:  1. No acute intracranial hemorrhage.  2. ASPECT SCORE 10/10.      ROSE BYNUM MD         SYSTEM ID:  [id number]   CTA Head Neck with Contrast    Narrative    EXAM: CTA HEAD NECK W CONTRAST, 9/25/2024 12:00 PM    HISTORY: Code Stroke to evaluate for potential thrombolysis and  thrombectomy. PLEASE READ IMMEDIATELY. Dizziness    COMPARISON: CT head 5/16/2022    TECHNIQUE:   Imaging protocol:    CTA Head and Neck: Following the administration of intravenous  contrast, thin helical CT angiography images of the brain were  obtained.  Postcontrast helical thin CT angiography images of the neck  were obtained.  NASCET criteria were applied. Source, multiplanar and  MIP reformatted images were reviewed.    Meds given: 75 ml isovue 370.  Acquisition: This CT exam was performed using one or more the  following dose reduction techniques: automated exposure control,  adjustment of the mA and/or kV according to patient size, and/or  iterative reconstruction technique.  Processing: 3D rendering on independent workstation using Maximum  Intensity Projection (MIP) was performed and archived to PACS. 3D  reconstructions are interpreted and reported by  supervising  radiologist.    FINDINGS:    CTA Brain:    Head CTA demonstrates no vascular malformation. Atherosclerotic  calcification is seen in the cavernous carotids. The petrous,  cavernous, and supraclinoid internal carotid arteries demonstrate no  high-grade, flow limiting stenosis.    The A1 anterior cerebral arteries are patent. The anterior  communicating artery is within normal limits. The distal anterior  cerebral arteries are within normal limits. The left and right middle  cerebral arteries are patent and demonstrate no significant focal  stenosis. Relative asymmetry of the MCPs along the right sylvian  fissure, likely secondary to chronic relative hypoperfusion of right  MCA territory infarcted parenchyma.    The left posterior communicating artery is not well-visualized. The  right posterior communicating artery is patent.    The basilar and vertebral arteries are within normal limits. The PCA  branches are patent. Moderate narrowing of the right P2/P3 PCA.    CTA Neck:    Visualized aorta is nondilated and demonstrates no acute abnormality.  Irregular calcified and soft plaque of the aortic arch. Normal variant  common origin of the left common carotid and innominate artery.. The  origins of the great vessels from the aortic arch are patent.  Irregular calcified and soft tissue plaque of the origins of the great  vessels with mild to moderate narrowing of the proximal left  subclavian and common origin of the left common carotid and right  innominate arteries. The innominate and bilateral subclavian arteries  are grossly patent.    The common carotid arteries demonstrate atherosclerotic plaque without  hemodynamically significant stenosis. Postsurgical changes of prior  right endarterectomy. No measurable stenosis of the right carotid  bulb. Bulky mixed plaque of the left carotid bulb. 45-55%  atherosclerotic narrowing of the proximal left internal carotid  artery. The distal bilateral internal  carotid arteries demonstrate no  evidence of flow-limiting stenosis or occlusion.    Neither vertebral artery is dominant. There is no evidence of  flow-limiting stenosis or occlusion of the vertebral arteries.    No mass or pneumothorax is seen at the apices. Multilevel degenerative  changes are seen in the cervical spine.      Impression    IMPRESSION:     CTA head: No acute flow-limiting stenosis of the major intracranial  arteries.    CTA neck: No acute flow-limiting stenosis of the major cervical  arteries. Patent right carotid bulb status post endarterectomy. 45-55%  atherosclerotic narrowing of the left proximal internal carotid  artery.      [Result: No acute abnormality on CT head, CT head perfusion, and CTA  head and neck]    Finding was identified on 9/25/2024 12:03 PM.     Provider DARLINE SHEPARD discussed results over the phone with Dr. Parra  at 9/25/2024 12:11 PM and verbalized understanding of the result.      ROSE PARRA MD         SYSTEM ID:  [id number]   CT Head Perfusion w Contrast - For Tier 2 Stroke    Narrative    EXAM: CT HEAD PERFUSION W CONTRAST 9/25/2024 12:17 PM    PROVIDED HISTORY: Code Stroke to evaluate for potential thrombolysis  and thrombectomy. Evaluate mismatch between penumbra and core infarct.  READ IMMEDIATELY. ; Large vessel occlusion     COMPARISON: CT head and CTA head 9/25/2024; CT head 5/16/2022    TECHNIQUE:   CT BRAIN PERFUSION: Dynamic perfusion CT of the brain was performed at  multiple levels after administration of intravenous contrast; Image  processing was performed by [employer] for the generation of color rCBF,  rCBV, Tmax, and MTT.     CONTRAST: 117 mL Isovue-370    FINDINGS:     CT Perfusion:   Images documenting relative cerebral blood volume, cerebral blood flow  and mean transit time demonstrate no evidence of ischemia or acute  infarction. There is a 5 mL volume of parenchyma demonstrating CBF of  less than 30% in the right occipital region. This area  also  demonstrates CBV of less than 43%. Findings correlate with an area of  chronic infarction. No area of abnormal Tmax.      Impression    IMPRESSION:   No evidence of ischemia or acute infarction on the CT Perfusion  examination.    [Result: No acute abnormality on CT head, CT head perfusion, and CTA  head and neck]    Finding was identified on 9/25/2024 12:03 PM.     Provider DARLINE SHEPARD discussed results over the phone with Dr. Parra  at 9/25/2024 12:11 PM and verbalized understanding of the result.      ROSE PARRA MD         SYSTEM ID:  [id number]   XR Chest 2 Views    Narrative    Exam:  XR CHEST 2 VIEWS    HISTORY: recent covid, cough, ?pneumonia.    COMPARISON:  9/14/2024    FINDINGS:     The cardiomediastinal contours are unchanged.      No focal consolidation, effusion, or pneumothorax.      No acute osseous abnormality.       Impression    IMPRESSION:      No acute cardiopulmonary process.      AMADO OROZCO MD         SYSTEM ID:  [id number]   UA with Microscopic reflex to Culture    Specimen: Urine, Midstream   Result Value Ref Range    Color Urine Light Yellow Colorless, Straw, Light Yellow, Yellow    Appearance Urine Clear Clear    Glucose Urine Negative Negative mg/dL    Bilirubin Urine Negative Negative    Ketones Urine Negative Negative mg/dL    Specific Gravity Urine 1.028 1.000 - 1.030    Blood Urine Negative Negative    pH Urine 5.5 5.0 - 9.0    Protein Albumin Urine 30 (A) Negative mg/dL    Urobilinogen Urine Normal Normal, 2.0 mg/dL    Nitrite Urine Negative Negative    Leukocyte Esterase Urine Negative Negative    Mucus Urine Present (A) None Seen /LPF    RBC Urine 1 <=2 /HPF    WBC Urine 1 <=5 /HPF    Hyaline Casts Urine 1 <=2 /LPF    Narrative    Urine Culture not indicated   MR Brain w/o & w Contrast    Narrative    EXAM: MR BRAIN W/O & W CONTRAST    HISTORY: dizzy, unsteady gait, ? posterior stroke, please add thin  cuts through the brainstem and coronal DWI.    TECHNIQUE: Multiplanar,  multisequence MR imaging of the head obtained  prior to, and after, intravenous contrast administration    MEDS/CONTRAST: 20 ML DOTAREM    COMPARISON: CT head, CTA head and neck and CT head perfusion  2024; CT head 2022     FINDINGS:      There is no mass effect, midline shift, or evidence of acute  intracranial hemorrhage. Diffusion weighted images demonstrate no  evidence of acute infarction. The ventricles are proportionate to the  cerebral sulci. Scattered foci of T2 hyperintense FLAIR signal in the  periventricular and subcortical white matter, which is nonspecific,  likely related to chronic small vessel ischemic disease given the  patient's age. Chronic right MCA territory cystic encephalomalacia  affecting the right frontal, parietal and occipital regions. Normal  major intracranial vascular flow voids.    Postcontrast images demonstrate no abnormal intracranial enhancement.    No suspicious abnormality of the skull marrow signal. Mild to moderate  paranasal sinus mucosal thickening. Bilateral mastoid effusions.  Bilateral pseudophakia.      Impression    IMPRESSION:  1. No evidence of acute infarction or intracranial hemorrhage.  2. Mild presumed small vessel ischemic disease changes.  3. Chronic right MCA territory infarct.    ROSE BYNUM MD         SYSTEM ID:  [id number]   Partial thromboplastin time   Result Value Ref Range    aPTT 27 22 - 38 Seconds   Troponin T, High Sensitivity   Result Value Ref Range    Troponin T, High Sensitivity 39 (H) <=22 ng/L   Echocardiogram Complete   Result Value Ref Range    LVEF  60-65%     [facility]    [id number]  [id number]  [id number]  [id number]^DREA^DARLINE     [facility]  [address] [place] [address]     Name: MARILY PENNY  MRN: [id number]  : 1940  Study Date: 2024 04:01 PM  Age: 84 yrs  Gender: Male  Patient Location: [facility]  Reason For Study: Abn EKG  Ordering Physician: DARLINE SHEPARD  Performed By: Sun  MARY Pham, RDCS, RVT     BSA: 2.2 m2  Height: 68 in  Weight: 234 lb  HR: 47  BP: 124/55 mmHg  ______________________________________________________________________________  Procedure  Complete Portable Echo Adult.  ______________________________________________________________________________  Interpretation Summary  Global and regional left ventricular function is normal with an EF of 60-65%.  Right ventricular function, chamber size, wall motion, and thickness are  normal.  The inferior vena cava is normal.  No pericardial effusion is present.  ______________________________________________________________________________  Left Ventricle  Global and regional left ventricular function is normal with an EF of 60-65%.  Left ventricular size is normal. Mild to moderate concentric wall thickening  consistent with left ventricular hypertrophy is present. Grade I or early  diastolic dysfunction. No regional wall motion abnormalities are seen.     Right Ventricle  Right ventricular function, chamber size, wall motion, and thickness are  normal.     Atria  Both atria appear normal.     Mitral Valve  The mitral valve is normal. Mild mitral annular calcification is present.     Aortic Valve  Aortic valve sclerosis is present.     Tricuspid Valve  The valve leaflets are not well visualized. On Doppler interrogation, there is  no significant stenosis or regurgitation. Pulmonary artery systolic pressure  cannot be assessed.     Pulmonic Valve  The valve leaflets are not well visualized. On Doppler interrogation, there is  no significant stenosis or regurgitation.     Vessels  The thoracic aorta is normal. The pulmonary artery cannot be assessed. The  inferior vena cava is normal.     Pericardium  No pericardial effusion is present.     ______________________________________________________________________________  MMode/2D Measurements & Calculations  IVSd: 1.4 cm  LVIDd: 4.3 cm  LVIDs: 3.0 cm  LVPWd: 1.5 cm  FS: 30.5  %  LV mass(C)d: 248.2 grams  LV mass(C)dI: 113.6 grams/m2  Ao root diam: 3.5 cm  asc Aorta Diam: 3.2 cm     LVOT diam: 2.0 cm  LVOT area: 3.0 cm2  Ao root diam index Ht(cm/m): 2.0  Ao root diam index BSA (cm/m2): 1.6  Asc Ao diam index BSA (cm/m2): 1.5  Asc Ao diam index Ht(cm/m): 1.9  LA Volume (BP): 64.7 ml  LA Volume Index (BP): 29.7 ml/m2  RWT: 0.72  TAPSE: 2.1 cm     Doppler Measurements & Calculations  MV E max oren: 104.0 cm/sec  MV A max oren: 141.0 cm/sec  MV E/A: 0.74  MV dec time: 0.34 sec  Ao V2 max: 196.6 cm/sec  Ao max P.0 mmHg  Ao V2 mean: 126.9 cm/sec  Ao mean P.8 mmHg  Ao V2 VTI: 50.9 cm  PHIL(I,D): 2.5 cm2  PHIL(V,D): 2.4 cm2  LV V1 max P.6 mmHg  LV V1 max: 155.0 cm/sec  LV V1 VTI: 41.5 cm  SV(LVOT): 125.1 ml  SI(LVOT): 57.2 ml/m2  PA acc time: 0.08 sec  AV Oren Ratio (DI): 0.79  PHIL Index (cm2/m2): 1.1  E/E' avg: 15.1  Lateral E/e': 12.4  Medial E/e': 17.7  RV S Oren: 14.3 cm/sec     ______________________________________________________________________________  Report approved by: Bibi Muñoz 2024 04:43 PM             Medications   iopamidol (ISOVUE-370) solution 117 mL (115 mLs Intravenous $Given 24 1158)     And   sodium chloride 0.9 % bag for CT scan flush use (140 mLs As instructed $Given 24 1158)   sodium chloride 0.9% BOLUS 500 mL (0 mLs Intravenous Stopped 24 1532)   magnesium sulfate 4 g in 100 mL sterile water intermittent infusion (0 g Intravenous Stopped 24 1636)   gadoterate meglumine (DOTAREM) injection 20 mL (20 mLs Intravenous $Given 24 1334)       Assessments & Plan (with Medical Decision Making)  [employer] Stroke Scale  Exam Interval: Baseline   Score    Level of consciousness: (0)   Alert, keenly responsive    LOC questions: (0)   Answers both questions correctly    LOC commands: (0)   Performs both tasks correctly    Best gaze: (0)   Normal    Visual: (0)   No visual loss    Facial palsy: (0)   Normal symmetrical  "movements    Motor arm (left): (0)   No drift- no acute motor deficits- history of left sided weakness from previous stroke patient reports    Motor arm (right): (0)   No drift    Motor leg (left): (0)   No drift- no acute motor deficits, history of left sided weakness from previous stroke patient reports    Motor leg (right): (0)   No drift    Limb ataxia: (0)   Absent    Sensory: (0)   Normal- no sensory loss    Best language: (0)   Normal- no aphasia    Dysarthria: (0)   Normal    Extinction and inattention: (0)   No abnormality        Total Score:  0      Tier one stroke code initially called by provider, nursing staff reported LKW 10 am. Further discussion with patient finds LKW 0300 am when he was awake in the middle of the night. Stroke neurology team, Doreen RAMIREZ, notified and updated stroke code.  Reports dizziness with position changes, not at rest, does not describe the room as \"spinning\" generalized weakness, new onset of unsteady gait per patient and nursing report . Will evaluate for stroke and other causes for symptoms today. He has previous hx of stroke and multiple co-morbidities.  He is alert, non-toxic, non distressed.   Labs & Radiology results interpreted by radiologist:   ED Course as of 09/25/24 1904   Wed Sep 25, 2024   1151 Glucose by meter(!)  normal   1159 CBC with Platelets & Differential(!)  Stable.   1207 Lactic acid whole blood with 1x repeat in 2 hr when >2  normal   1212 Troponin T, High Sensitivity(!)  45; will evaluate with 2 hour   1212 Dr. Parra reported no acute findings on head CT,CTA, CT perfusion no hemorrhage    1218 Magnesium(!)  1.4, low, replacement ordered.    1235 Basic metabolic panel(!)  Creatinine 1.90 BUN 31.4 GFR 34; has had poor renal function in the past; slightly more impaired from 10 days ago during hospital admission. 500ml IVF bolus given.    1246 Spoke with JAMES Logan- de-escalate stroke, MRI with and without   1319 UA with Microscopic reflex to " Culture(!)  Unremarkable    1405 XR Chest 2 Views  No acute cardiopulmonary process   1419 MR Brain w/o & w Contrast  1. No evidence of acute infarction or intracranial hemorrhage.  2. Mild presumed small vessel ischemic disease changes.  3. Chronic right MCA territory infarct      Stroke neurology team updated.    1525 Troponin T, High Sensitivity(!)  39- stable and decreased from 45.    1650 Echocardiogram Complete  No acute changes from ECHO in June      Meds: IVF bolus  EKG x2 showing T wave inversion concern for ischemic changes elevated troponin- repeat is stable, ECHO today showing no acute changes compared to previous  Has known CAD, LCX 90% stenosis, distal RCA stenosis, cardiac ischemia noted on jeramie scan Feb 2024, has had abnormal stress test in the past; has been following with [facility] cardiology for medical management. He is not having chest pain, SOB  3:50 PM Patient up ambulating in the hallway with walker, tolerating activity.   5:38 PM Consulted with Cardiology at [facility], Dr. Rey, sent EKGs for her to evaluation , changes in T waves waves and Q waves appear different from his EKGs in their system from last year, however with otherwise no chest pain, shortness of breath and re-assuring flat troponin levels, patient does not necessarily need to be acutely transferred for cardiology evaluation today for ACS   I discussed lab results, CT, MRI, EKG results with patient, family.  Patient would like to discharge home. Symptoms may be related to recent illness, multiple co-morbidities, polypharmacy. I advised him to return to be seen for a recheck in the clinic this week, Follow up in clinic with cardiology team,  return if new or worsening concerns. He discharged home in stable condition with family.      I have reviewed the nursing notes.    I have reviewed the findings, diagnosis, plan and need for follow up with the patient.    Medical Decision Making  The patient's presentation was of high  complexity (a chronic illness severe exacerbation, progression, or side effect of treatment).    The patient's evaluation involved:  review of external note(s) from 1 sources (see separate area of note for details)  review of 3+ test result(s) ordered prior to this encounter (see separate area of note for details)  ordering and/or review of 3+ test(s) in this encounter (see separate area of note for details)  discussion of management or test interpretation with another health professional (see separate area of note for details)    The patient's management necessitated moderate risk (prescription drug management including medications given in the ED) and moderate risk (IV contrast administration).      Discharge Medication List as of 9/25/2024  5:57 PM          Final diagnoses:   Dizziness   Generalized weakness   Hypomagnesemia   Coronary artery disease involving native coronary artery of native heart without angina pectoris   Chronic kidney disease, stage III (moderate) (H)       9/25/2024   [facility]       Diana Allan APRN CNP  09/25/24 6825       Diana Allan APRN CNP  09/25/24 1188

## 2024-09-25 NOTE — DISCHARGE INSTRUCTIONS
Follow up with clinic provider in the next week for recheck- I see you have an appointment on Oct 17, if you can get in sooner this would be ideal. return here if worsening symptoms  Physical therapy as scheduled  Cardiology as scheduled-   If you have chest pain, SOB, fever, nausea, vomiting or other concerns, please be seen

## 2024-09-25 NOTE — ED TRIAGE NOTES
"Pt presents to ED via private car with c/o dizziness. Pt states when he got up at 1000 he was very unsteady on his feet. Pt got up in the middle of the night and was \"just fine\". /67   Pulse 52   Temp 98.5  F (36.9  C) (Tympanic)   Resp 18   Ht 1.727 m (5' 8\")   Wt 106.5 kg (234 lb 11.2 oz)   SpO2 94%   BMI 35.69 kg/m         Triage Assessment (Adult)       Row Name 09/25/24 1121          Triage Assessment    Airway WDL WDL        Respiratory WDL    Respiratory WDL X;cough     Cough Frequency infrequent     Cough Type dry        Skin Circulation/Temperature WDL    Skin Circulation/Temperature WDL WDL        Cardiac WDL    Cardiac WDL X;rhythm     Pulse Rate & Regularity bradycardic        Peripheral/Neurovascular WDL    Peripheral Neurovascular WDL WDL        Cognitive/Neuro/Behavioral WDL    Cognitive/Neuro/Behavioral WDL WDL                     "

## 2024-09-25 NOTE — CONSULTS
[facility]    Stroke Telephone Note    I was called by Diana Allan CNP on 09/25/24 regarding patient Jasson Dougherty. The patient is a 84 year old male with a PMH significant for HTN, HLD, CKD, hx of rectal cancer, hx of stroke (2015, right MCA territory with residual left-sided weakness), T2DM, GERD, CAD.  History obtained from ED provider. LKW at 3:00 am when he woke up overnight feeling normal. He then went back to bed and woke up again 9:30 am with symptoms of dizziness and unsteady gait.  He also reports both legs felt unsteady when trying to ambulate this morning.  Of note, Jasson recently had a COVID infection. On ED providers examination, residual left arm and leg weakness are appreciated but no ataxia (though weakness is appreciated with L) heel-to-torres noted. Jasson reports his dizziness worsens with position and improves when sitting/laying.  He has not walked yet in the ED.  BP is 118/67.    Vitals  BP: 124/55   Pulse: 53   Resp: 18   Temp: 98.5  F (36.9  C)   Weight: 106.5 kg (234 lb 11.2 oz)    Stroke Code Data (for stroke code without tele)  Stroke code activated 09/25/24  1132   Stroke provider first response 09/25/24  1135   Last known normal 09/25/24  0300      Time of discovery (or onset of symptoms) 09/25/24  0930   Head CT read by Stroke Neuro Provider 09/25/24  1153   Was stroke code de-escalated? Yes  09/25/24  1246     Imaging Findings  CT head: No evidence of hemorrhage. Chronic, large Right MCA infarct.   CTA head: No acute flow-limiting stenosis of the major intracranial  arteries.  CTA neck: No acute flow-limiting stenosis of the major cervical arteries. Patent right carotid bulb status post endarterectomy. 45-55% atherosclerotic narrowing of the left proximal internal carotid artery  CT Perfusion: No evidence of ischemia or acute infarction on the CT Perfusion  examination    Intravenous Thrombolysis  Not given due to:   - unclear or unfavorable risk-benefit  "profile for extended window thrombolysis beyond the conventional 4.5 hour time window    Endovascular Treatment  Not initiated due to absence of proximal vessel occlusion    Impression  Dizziness and unsteady gait, unclear etiology. History suggestive of peripheral etiology given positional involvement/worsening however will evaluate for posterior circulation infarct    Recommendations   - brain MRI with and without contrast with thin cuts through the brainstem and coronal DWI; please page stroke neurology if infarct is identified for further recommendations    Case discussed with vascular neurology attending Dr. Acevedo.    My recommendations are based on the information provided over the phone by Jasson Dougherty's in-person providers. They are not intended to replace the clinical judgment of his in-person providers. I was not requested to personally see or examine the patient at this time.     Doreen Hodges PA-C  Vascular Neurology    To page me or covering stroke neurology team member, click here: [facility]  Choose \"On Call\" tab at top, then select \"NEUROLOGY/ALL SITES\" from middle drop-down box, press Enter, then look for \"stroke\" or \"telestroke\" for your site.   "

## 2024-09-25 NOTE — PROGRESS NOTES
1.  Has the patient had a previous reaction to IV contrast? No    2.  Does the patient have kidney disease? Yes - CKD3 - GFR 44 - stroke pt    3.  Is the patient on dialysis? No    If YES to any of these questions, exam will be reviewed with a Radiologist before administering contrast.  IV Contrast- Discharge Instructions After Your CT Scan      The IV contrast you received today will be filtered from your bloodstream by your kidneys during the next 24 hours and pass from the body in urine.  You will not be aware of this process and your urine will not change in color.  To help this process you should drink at least 4 additional glasses of water or juice today.  This reduces stress on your kidneys.    Most contrast reactions are immediate.  Should you develop symptoms of concern after discharge, contact the department at the number below.  After hours you should contact your personal physician.  If you develop breathing distress or wheezing, call 911.

## 2024-09-25 NOTE — ED NOTES
Patient C/O bilateral LE weakness since waking up at 0930 this am. He does not usually use any assistive devices for ambulation, but now requires assist of 1-2 for W/C to bed transfer.

## 2024-09-28 LAB
ATRIAL RATE - MUSE: 52 BPM
ATRIAL RATE - MUSE: 53 BPM
DIASTOLIC BLOOD PRESSURE - MUSE: NORMAL MMHG
DIASTOLIC BLOOD PRESSURE - MUSE: NORMAL MMHG
INTERPRETATION ECG - MUSE: NORMAL
INTERPRETATION ECG - MUSE: NORMAL
P AXIS - MUSE: 49 DEGREES
P AXIS - MUSE: 68 DEGREES
PR INTERVAL - MUSE: 226 MS
PR INTERVAL - MUSE: 238 MS
QRS DURATION - MUSE: 102 MS
QRS DURATION - MUSE: 106 MS
QT - MUSE: 478 MS
QT - MUSE: 490 MS
QTC - MUSE: 448 MS
QTC - MUSE: 455 MS
R AXIS - MUSE: -8 DEGREES
R AXIS - MUSE: 12 DEGREES
SYSTOLIC BLOOD PRESSURE - MUSE: NORMAL MMHG
SYSTOLIC BLOOD PRESSURE - MUSE: NORMAL MMHG
T AXIS - MUSE: 123 DEGREES
T AXIS - MUSE: 134 DEGREES
VENTRICULAR RATE- MUSE: 52 BPM
VENTRICULAR RATE- MUSE: 53 BPM

## 2024-11-20 ENCOUNTER — HOSPITAL ENCOUNTER (OUTPATIENT)
Dept: ULTRASOUND IMAGING | Facility: OTHER | Age: 84
Discharge: HOME | End: 2024-11-20
Attending: NURSE PRACTITIONER
Payer: COMMERCIAL

## 2024-11-20 DIAGNOSIS — Z01.89 ENCOUNTER FOR OTHER SPECIFIED SPECIAL EXAMINATIONS: ICD-10-CM

## 2024-11-20 PROCEDURE — 76770 US EXAM ABDO BACK WALL COMP: CPT

## 2024-11-24 ENCOUNTER — HEALTH MAINTENANCE LETTER (OUTPATIENT)
Age: 84
End: 2024-11-24

## 2025-04-05 ENCOUNTER — HEALTH MAINTENANCE LETTER (OUTPATIENT)
Age: 85
End: 2025-04-05

## (undated) RX ORDER — DEXAMETHASONE SODIUM PHOSPHATE 10 MG/ML
INJECTION, SOLUTION INTRAMUSCULAR; INTRAVENOUS
Status: DISPENSED
Start: 2023-09-20

## (undated) RX ORDER — MAGNESIUM SULFATE HEPTAHYDRATE 40 MG/ML
INJECTION, SOLUTION INTRAVENOUS
Status: DISPENSED
Start: 2024-09-25

## (undated) RX ORDER — ASPIRIN 81 MG/1
TABLET, CHEWABLE ORAL
Status: DISPENSED
Start: 2023-09-20

## (undated) RX ORDER — ACETAMINOPHEN 325 MG/1
TABLET ORAL
Status: DISPENSED
Start: 2023-09-20

## (undated) RX ORDER — FUROSEMIDE 10 MG/ML
INJECTION INTRAMUSCULAR; INTRAVENOUS
Status: DISPENSED
Start: 2023-06-07

## (undated) RX ORDER — MAGNESIUM SULFATE HEPTAHYDRATE 40 MG/ML
INJECTION, SOLUTION INTRAVENOUS
Status: DISPENSED
Start: 2023-09-20

## (undated) RX ORDER — ALBUTEROL SULFATE 90 UG/1
AEROSOL, METERED RESPIRATORY (INHALATION)
Status: DISPENSED
Start: 2023-09-20

## (undated) RX ORDER — CEFTRIAXONE SODIUM 2 G/50ML
INJECTION, SOLUTION INTRAVENOUS
Status: DISPENSED
Start: 2022-11-30

## (undated) RX ORDER — AZITHROMYCIN 500 MG/5ML
INJECTION, POWDER, LYOPHILIZED, FOR SOLUTION INTRAVENOUS
Status: DISPENSED
Start: 2022-05-16

## (undated) RX ORDER — ACETAMINOPHEN 500 MG
TABLET ORAL
Status: DISPENSED
Start: 2022-05-16

## (undated) RX ORDER — FUROSEMIDE 10 MG/ML
INJECTION INTRAMUSCULAR; INTRAVENOUS
Status: DISPENSED
Start: 2023-09-20

## (undated) RX ORDER — CEFTRIAXONE 2 G/1
INJECTION, POWDER, FOR SOLUTION INTRAMUSCULAR; INTRAVENOUS
Status: DISPENSED
Start: 2023-09-20

## (undated) RX ORDER — CEFTRIAXONE SODIUM 1 G/50ML
INJECTION, SOLUTION INTRAVENOUS
Status: DISPENSED
Start: 2022-05-16

## (undated) RX ORDER — HEPARIN SODIUM 10000 [USP'U]/100ML
INJECTION, SOLUTION INTRAVENOUS
Status: DISPENSED
Start: 2023-09-21

## (undated) RX ORDER — KETOROLAC TROMETHAMINE 15 MG/ML
INJECTION, SOLUTION INTRAMUSCULAR; INTRAVENOUS
Status: DISPENSED
Start: 2022-11-30

## (undated) RX ORDER — KETOROLAC TROMETHAMINE 15 MG/ML
INJECTION, SOLUTION INTRAMUSCULAR; INTRAVENOUS
Status: DISPENSED
Start: 2024-09-14

## (undated) RX ORDER — AZITHROMYCIN 500 MG/5ML
INJECTION, POWDER, LYOPHILIZED, FOR SOLUTION INTRAVENOUS
Status: DISPENSED
Start: 2023-09-20

## (undated) RX ORDER — ACETAMINOPHEN 500 MG
TABLET ORAL
Status: DISPENSED
Start: 2024-09-14

## (undated) RX ORDER — AZITHROMYCIN 500 MG/5ML
INJECTION, POWDER, LYOPHILIZED, FOR SOLUTION INTRAVENOUS
Status: DISPENSED
Start: 2022-11-30

## (undated) RX ORDER — SODIUM CHLORIDE 9 MG/ML
INJECTION, SOLUTION INTRAVENOUS
Status: DISPENSED
Start: 2022-05-16

## (undated) RX ORDER — ACETAMINOPHEN 500 MG
TABLET ORAL
Status: DISPENSED
Start: 2022-11-30